# Patient Record
Sex: FEMALE | Race: WHITE | NOT HISPANIC OR LATINO | URBAN - METROPOLITAN AREA
[De-identification: names, ages, dates, MRNs, and addresses within clinical notes are randomized per-mention and may not be internally consistent; named-entity substitution may affect disease eponyms.]

---

## 2023-10-01 ENCOUNTER — HOSPITAL ENCOUNTER (EMERGENCY)
Facility: HOSPITAL | Age: 68
Discharge: HOME | End: 2023-10-01
Attending: EMERGENCY MEDICINE
Payer: MEDICARE

## 2023-10-01 ENCOUNTER — APPOINTMENT (OUTPATIENT)
Dept: RADIOLOGY | Facility: HOSPITAL | Age: 68
End: 2023-10-01
Payer: MEDICARE

## 2023-10-01 VITALS
RESPIRATION RATE: 18 BRPM | TEMPERATURE: 97.2 F | SYSTOLIC BLOOD PRESSURE: 102 MMHG | WEIGHT: 89.7 LBS | HEART RATE: 80 BPM | BODY MASS INDEX: 16.51 KG/M2 | DIASTOLIC BLOOD PRESSURE: 60 MMHG | HEIGHT: 62 IN | OXYGEN SATURATION: 99 %

## 2023-10-01 DIAGNOSIS — G89.29 CHRONIC LOW BACK PAIN WITHOUT SCIATICA, UNSPECIFIED BACK PAIN LATERALITY: Primary | ICD-10-CM

## 2023-10-01 DIAGNOSIS — M54.50 CHRONIC LOW BACK PAIN WITHOUT SCIATICA, UNSPECIFIED BACK PAIN LATERALITY: Primary | ICD-10-CM

## 2023-10-01 LAB
ALBUMIN SERPL BCP-MCNC: 3.5 G/DL (ref 3.4–5)
ALP SERPL-CCNC: 88 U/L (ref 33–136)
ALT SERPL W P-5'-P-CCNC: 16 U/L (ref 7–45)
ANION GAP SERPL CALC-SCNC: 16 MMOL/L (ref 10–20)
APPEARANCE UR: CLEAR
AST SERPL W P-5'-P-CCNC: 25 U/L (ref 9–39)
BACTERIA #/AREA URNS AUTO: ABNORMAL /HPF
BILIRUB SERPL-MCNC: 0.4 MG/DL (ref 0–1.2)
BILIRUB UR STRIP.AUTO-MCNC: NEGATIVE MG/DL
BUN SERPL-MCNC: 24 MG/DL (ref 6–23)
CALCIUM SERPL-MCNC: 7.2 MG/DL (ref 8.6–10.3)
CHLORIDE SERPL-SCNC: 98 MMOL/L (ref 98–107)
CO2 SERPL-SCNC: 23 MMOL/L (ref 21–32)
COLOR UR: YELLOW
CREAT SERPL-MCNC: 1.36 MG/DL (ref 0.5–1.05)
ERYTHROCYTE [DISTWIDTH] IN BLOOD BY AUTOMATED COUNT: 12 % (ref 11.5–14.5)
GFR SERPL CREATININE-BSD FRML MDRD: 43 ML/MIN/1.73M*2
GLUCOSE SERPL-MCNC: 105 MG/DL (ref 74–99)
GLUCOSE UR STRIP.AUTO-MCNC: NEGATIVE MG/DL
HCT VFR BLD AUTO: 27.9 % (ref 36–46)
HGB BLD-MCNC: 9.4 G/DL (ref 12–16)
KETONES UR STRIP.AUTO-MCNC: NEGATIVE MG/DL
LEUKOCYTE ESTERASE UR QL STRIP.AUTO: NEGATIVE
MCH RBC QN AUTO: 32.4 PG (ref 26–34)
MCHC RBC AUTO-ENTMCNC: 33.7 G/DL (ref 32–36)
MCV RBC AUTO: 96 FL (ref 80–100)
NITRITE UR QL STRIP.AUTO: NEGATIVE
NRBC BLD-RTO: 0 /100 WBCS (ref 0–0)
PH UR STRIP.AUTO: 5 [PH]
PLATELET # BLD AUTO: 344 X10*3/UL (ref 150–450)
PMV BLD AUTO: 11.7 FL (ref 7.5–11.5)
POTASSIUM SERPL-SCNC: 3.4 MMOL/L (ref 3.5–5.3)
PROT SERPL-MCNC: 6.4 G/DL (ref 6.4–8.2)
PROT UR STRIP.AUTO-MCNC: NEGATIVE MG/DL
RBC # BLD AUTO: 2.9 X10*6/UL (ref 4–5.2)
RBC # UR STRIP.AUTO: ABNORMAL /UL
RBC #/AREA URNS AUTO: ABNORMAL /HPF
SODIUM SERPL-SCNC: 134 MMOL/L (ref 136–145)
SP GR UR STRIP.AUTO: 1.01
TRI-PHOS CRY #/AREA UR COMP ASSIST: ABNORMAL /HPF
UROBILINOGEN UR STRIP.AUTO-MCNC: <2 MG/DL
WBC # BLD AUTO: 11 X10*3/UL (ref 4.4–11.3)
WBC #/AREA URNS AUTO: ABNORMAL /HPF

## 2023-10-01 PROCEDURE — 81001 URINALYSIS AUTO W/SCOPE: CPT | Performed by: STUDENT IN AN ORGANIZED HEALTH CARE EDUCATION/TRAINING PROGRAM

## 2023-10-01 PROCEDURE — 99284 EMERGENCY DEPT VISIT MOD MDM: CPT | Performed by: EMERGENCY MEDICINE

## 2023-10-01 PROCEDURE — 85027 COMPLETE CBC AUTOMATED: CPT | Performed by: STUDENT IN AN ORGANIZED HEALTH CARE EDUCATION/TRAINING PROGRAM

## 2023-10-01 PROCEDURE — 36415 COLL VENOUS BLD VENIPUNCTURE: CPT | Performed by: STUDENT IN AN ORGANIZED HEALTH CARE EDUCATION/TRAINING PROGRAM

## 2023-10-01 PROCEDURE — 2500000004 HC RX 250 GENERAL PHARMACY W/ HCPCS (ALT 636 FOR OP/ED): Performed by: STUDENT IN AN ORGANIZED HEALTH CARE EDUCATION/TRAINING PROGRAM

## 2023-10-01 PROCEDURE — 80053 COMPREHEN METABOLIC PANEL: CPT | Performed by: STUDENT IN AN ORGANIZED HEALTH CARE EDUCATION/TRAINING PROGRAM

## 2023-10-01 PROCEDURE — 70450 CT HEAD/BRAIN W/O DYE: CPT | Performed by: RADIOLOGY

## 2023-10-01 PROCEDURE — 70450 CT HEAD/BRAIN W/O DYE: CPT | Mod: MG

## 2023-10-01 PROCEDURE — G1004 CDSM NDSC: HCPCS

## 2023-10-01 PROCEDURE — 96374 THER/PROPH/DIAG INJ IV PUSH: CPT

## 2023-10-01 RX ORDER — KETOROLAC TROMETHAMINE 15 MG/ML
15 INJECTION, SOLUTION INTRAMUSCULAR; INTRAVENOUS ONCE
Status: COMPLETED | OUTPATIENT
Start: 2023-10-01 | End: 2023-10-01

## 2023-10-01 RX ORDER — HYDROCODONE BITARTRATE AND ACETAMINOPHEN 5; 325 MG/1; MG/1
0.5 TABLET ORAL EVERY 6 HOURS PRN
Qty: 15 TABLET | Refills: 0 | Status: SHIPPED | OUTPATIENT
Start: 2023-10-01 | End: 2023-10-09

## 2023-10-01 RX ORDER — METHYLPREDNISOLONE 4 MG/1
TABLET ORAL
Qty: 21 TABLET | Refills: 0 | Status: SHIPPED | OUTPATIENT
Start: 2023-10-01 | End: 2023-10-08

## 2023-10-01 RX ORDER — LIDOCAINE 50 MG/G
1 PATCH TOPICAL
Qty: 14 PATCH | Refills: 0 | Status: SHIPPED | OUTPATIENT
Start: 2023-10-01 | End: 2023-10-15

## 2023-10-01 RX ADMIN — KETOROLAC TROMETHAMINE 15 MG: 15 INJECTION INTRAMUSCULAR; INTRAVENOUS at 13:21

## 2023-10-01 ASSESSMENT — COLUMBIA-SUICIDE SEVERITY RATING SCALE - C-SSRS
6. HAVE YOU EVER DONE ANYTHING, STARTED TO DO ANYTHING, OR PREPARED TO DO ANYTHING TO END YOUR LIFE?: NO
1. IN THE PAST MONTH, HAVE YOU WISHED YOU WERE DEAD OR WISHED YOU COULD GO TO SLEEP AND NOT WAKE UP?: NO
2. HAVE YOU ACTUALLY HAD ANY THOUGHTS OF KILLING YOURSELF?: NO

## 2023-10-01 ASSESSMENT — PAIN SCALES - GENERAL
PAINLEVEL_OUTOF10: 10 - WORST POSSIBLE PAIN
PAINLEVEL_OUTOF10: 3
PAINLEVEL_OUTOF10: 4
PAINLEVEL_OUTOF10: 0 - NO PAIN

## 2023-10-01 ASSESSMENT — PAIN - FUNCTIONAL ASSESSMENT: PAIN_FUNCTIONAL_ASSESSMENT: 0-10

## 2023-10-01 ASSESSMENT — PAIN DESCRIPTION - ORIENTATION: ORIENTATION: LOWER;MID;UPPER

## 2023-10-01 ASSESSMENT — PAIN DESCRIPTION - LOCATION: LOCATION: BACK

## 2023-10-01 NOTE — ED PROVIDER NOTES
HPI   Chief Complaint   Patient presents with    Back Pain     Radiates down R leg       Patient is a 68-year-old female presents the ER due to concern for low back pain.  According to patient, she began to have some low back pain over the past 2 days.  She denies any weakness.  Arms or legs.  She does report some tingling in her feet, she reports this is related to her gout which she is on colchicine for.  She denies any fevers or chills.  She denies any recent falls.  She took tramadol and Flexeril over the past few days.  Her  was concerned as she has been reporting hallucinations and seeing animals that are not there.  He otherwise denies any other injuries or symptoms.  Patient has taken Flexeril and tramadol before however is never had it with wine previously.  Denies any dysuria, hematuria, chest pain, abdominal pain.    The patient was seen by the resident/fellow.  I have personally performed a substantive portion of the encounter.  I have seen and examined the patient; agree with the workup, evaluation, MDM, management and diagnosis.  The care plan has been discussed with the resident/fellow; I have reviewed the resident/fellow's note and agree with the documented findings with the exception/addition of the following:    When I spoke to the patient, she had an issue like this in the past, around 2018 and it lasted for several days to a week and then dissipated and went away and has not had any issues until 2 to 3 days ago.  The pain does go intermittently down both legs, does radiate down to the feet, but she has no weakness, no loss of bowel or bladder control, no rectal issues, no urinary retention.  She is able to ambulate to the bathroom here, and urinate fully with no retention.  At this time I recommended she follow-up with her primary care doctor, and return to the emergency room for any worsening pain down the legs, weakness on one leg or the other, difficulty urinating, loss of bladder  control, loss of bowel control, or any worsening concerning symptoms.                            Alessandra Coma Scale Score: 15                  Patient History   History reviewed. No pertinent past medical history.  History reviewed. No pertinent surgical history.  No family history on file.  Social History     Tobacco Use    Smoking status: Never    Smokeless tobacco: Never   Substance Use Topics    Alcohol use: Never    Drug use: Never       Physical Exam   ED Triage Vitals [10/01/23 1242]   Temp Heart Rate Resp BP   36.2 °C (97.2 °F) 94 16 109/66      SpO2 Temp Source Heart Rate Source Patient Position   100 % Temporal Monitor Sitting      BP Location FiO2 (%)     Right arm --       Physical Exam  Vitals and nursing note reviewed.   Constitutional:       General: She is not in acute distress.     Appearance: She is well-developed.   HENT:      Head: Normocephalic and atraumatic.      Right Ear: External ear normal.      Left Ear: External ear normal.   Eyes:      Conjunctiva/sclera: Conjunctivae normal.   Cardiovascular:      Rate and Rhythm: Normal rate and regular rhythm.      Heart sounds: No murmur heard.  Pulmonary:      Effort: Pulmonary effort is normal. No respiratory distress.      Breath sounds: Normal breath sounds.   Abdominal:      Palpations: Abdomen is soft.      Tenderness: There is no abdominal tenderness.   Musculoskeletal:         General: No swelling.      Cervical back: Neck supple.      Comments: No midline C, T, L-spine tenderness to palpation   Skin:     General: Skin is warm and dry.      Capillary Refill: Capillary refill takes less than 2 seconds.   Neurological:      Mental Status: She is alert.   Psychiatric:         Mood and Affect: Mood normal.         ED Course & MDM   ED Course as of 10/01/23 1624   Sun Oct 01, 2023   1622 CBC with mild anemia of 9.4, otherwise unremarkable. [MJ]   1622 UA with hematuria, however not consistent with UTI. [MJ]   1622 CMP with mild hypokalemia of 3.4  as well as mildly elevated BUN/creatinine of 24/1.36. [MJ]   1623   CT imaging of head with no acute intracranial process [MJ]   1623 As patient is no longer having hallucinations and work-up was relatively unremarkable, do suspect likely related to patient's medications.  She was given prescription of Lidoderm patches as well as Medrol Dosepak and Norco for pain.  She was instructed to take half tablet of Norco for pain.  She was instructed to follow-up with her PCP to discuss her ER visit.  Strict return precautions were given.  They were understanding and agreeable with plan for discharge. [MJ]      ED Course User Index  [MJ] Alejandro Maharaj DO         Diagnoses as of 10/01/23 1624   Chronic low back pain without sciatica, unspecified back pain laterality       Medical Decision Making  Patient is a 68-year-old female who presents the ER due to concern for low back pain.  On arrival, patient is in no acute distress and vital signs were stable.  She does not have any signs consistent with cauda equina syndrome so do not feel patient warrants MRI imaging at this time.  Given the reported hallucinations I do suspect likely related to the medication she was using including Flexeril and tramadol, however will get CT imaging of head as well as basic lab work including CBC and BMP for patient.  Did also get UA as well.    Amount and/or Complexity of Data Reviewed  Independent Historian: spouse        Procedure  Procedures     Alejandro Maharaj DO  Resident  10/01/23 1624       Brooks Perez DO  10/03/23 3878

## 2023-11-15 ENCOUNTER — OFFICE VISIT (OUTPATIENT)
Dept: RHEUMATOLOGY | Facility: CLINIC | Age: 68
End: 2023-11-15
Payer: MEDICARE

## 2023-11-15 VITALS
SYSTOLIC BLOOD PRESSURE: 152 MMHG | DIASTOLIC BLOOD PRESSURE: 81 MMHG | WEIGHT: 90 LBS | HEIGHT: 62 IN | BODY MASS INDEX: 16.56 KG/M2 | TEMPERATURE: 97.7 F | HEART RATE: 109 BPM

## 2023-11-15 DIAGNOSIS — M05.742 RHEUMATOID ARTHRITIS INVOLVING BOTH HANDS WITH POSITIVE RHEUMATOID FACTOR (MULTI): Primary | ICD-10-CM

## 2023-11-15 DIAGNOSIS — M05.741 RHEUMATOID ARTHRITIS INVOLVING BOTH HANDS WITH POSITIVE RHEUMATOID FACTOR (MULTI): Primary | ICD-10-CM

## 2023-11-15 PROCEDURE — 99213 OFFICE O/P EST LOW 20 MIN: CPT | Performed by: INTERNAL MEDICINE

## 2023-11-15 PROCEDURE — 1126F AMNT PAIN NOTED NONE PRSNT: CPT | Performed by: INTERNAL MEDICINE

## 2023-11-15 PROCEDURE — 1159F MED LIST DOCD IN RCRD: CPT | Performed by: INTERNAL MEDICINE

## 2023-11-15 RX ORDER — LOSARTAN POTASSIUM 100 MG/1
100 TABLET ORAL DAILY
COMMUNITY
End: 2024-04-16 | Stop reason: SDUPTHER

## 2023-11-15 RX ORDER — MECLIZINE HCL 12.5 MG 12.5 MG/1
12.5 TABLET ORAL 3 TIMES DAILY PRN
COMMUNITY
End: 2024-03-07 | Stop reason: ALTCHOICE

## 2023-11-15 RX ORDER — FUROSEMIDE 40 MG/1
1 TABLET ORAL DAILY
COMMUNITY
Start: 2021-07-13 | End: 2024-03-07 | Stop reason: WASHOUT

## 2023-11-15 RX ORDER — CHOLECALCIFEROL (VITAMIN D3) 25 MCG
3000 TABLET,CHEWABLE ORAL DAILY
COMMUNITY
End: 2024-03-09 | Stop reason: ENTERED-IN-ERROR

## 2023-11-15 RX ORDER — ASCORBIC ACID 1000 MG
TABLET, EXTENDED RELEASE ORAL
COMMUNITY
End: 2024-03-09 | Stop reason: ENTERED-IN-ERROR

## 2023-11-15 RX ORDER — TRAMADOL HYDROCHLORIDE 50 MG/1
50 TABLET ORAL EVERY 6 HOURS PRN
COMMUNITY
End: 2024-03-07 | Stop reason: ALTCHOICE

## 2023-11-15 RX ORDER — FLUTICASONE PROPIONATE 50 MCG
1 SPRAY, SUSPENSION (ML) NASAL DAILY
COMMUNITY
End: 2024-03-07 | Stop reason: WASHOUT

## 2023-11-15 RX ORDER — COLCHICINE 0.6 MG/1
0.6 CAPSULE ORAL DAILY
COMMUNITY
End: 2024-03-07 | Stop reason: WASHOUT

## 2023-11-15 RX ORDER — CYCLOBENZAPRINE HCL 10 MG
10 TABLET ORAL 2 TIMES DAILY PRN
COMMUNITY
End: 2024-03-07 | Stop reason: WASHOUT

## 2023-11-15 RX ORDER — FLUTICASONE PROPIONATE 50 MCG
SPRAY, SUSPENSION (ML) NASAL
COMMUNITY
Start: 2014-07-08

## 2023-11-15 RX ORDER — HYDROXYCHLOROQUINE SULFATE 200 MG/1
1 TABLET, FILM COATED ORAL DAILY
COMMUNITY

## 2023-11-15 ASSESSMENT — PAIN SCALES - GENERAL: PAINLEVEL: 0-NO PAIN

## 2023-11-16 NOTE — PROGRESS NOTES
Informants: Patient and EMR.  PP: 68-year-old female with rheumatoid arthritis and asthma.  CC: She presents for follow-up of rheumatoid arthritis  H OPI: She was initially diagnosed with rheumatoid arthritis in 2016 when she presented with pain stiffness and swelling in her hands.  She had significant improvement in the musculoskeletal symptoms with taking hydroxychloroquine.  She has not had any rashes, uveitis symptoms, mucosal ulcerations, or sicca symptoms.  She has not had any Raynaud's symptoms.  She has pain and numbness in the right hand that she attributes to the carpal tunnel syndrome and has pain in the right hip due to arthritis.  She is planning on having right carpal tunnel release surgery prior to considering any surgical intervention for the right hip pain.  PH: Allergies: Penicillin, amoxicillin (nausea, diarrhea, vomiting), codeine, flu shots, tetanus shots (redness and swelling); illnesses: Hypertension, osteoarthritis, osteopenia, asthma, osteoarthritis of the neck, rheumatoid arthritis (diagnosed ); surgeries: Appendectomy (), D&C ( for dysfunctional uterine bleeding), hysteroscopy ( for leiomyomata and dysfunctional uterine bleeding), oral surgery.  SH: She quit tobacco smoking but formally smoked half pack of cigarettes per day.  She has 2 glasses of wine per day.  She denies illicit drug use.  She is retired.  FH: Father is  at 87 years of age and had arthritis heart disease and Alzheimer's disease.  Her mother atrial fibrillation aphasic dementia kidney disease, heart disease.  Her brother has atrial fibrillation and arthritis.  Her sister has arthritis and atrial fibrillation.  She has no children.  She has a grandmother that cancer stroke colitis diabetes mellitus.  She has a paternal grandmother with rheumatoid arthritis.  ROS: She has had decreased appetite and has weight loss that she attributes to caring for her mother with dementia.  Her mother was recently  placed in a memory care facility reducing her stress.  PX: She is a thin, well-developed, well-nourished, white female.  The lungs, heart, abdomen, and extremities are benign.  The musculoskeletal examination shows good passive range of motion of the upper and lower extremity joints without joint effusions.  There is mild pain on range of motion of the right hip.  There is crepitus on passive range of motion of the neck.  The neurological examination shows good muscle strength and both hands as well as the upper and lower extremities.  Laboratory (10/1/2023) glucose 105, potassium 3.4, sodium 134, chloride 98, bicarbonate 23, BUN 24, creatinine 1.36, WBC 11.0, hemoglobin 9.4, hematocrit 27.9, MCV 96, MCHC 33.7, platelets 344, urinalysis no protein glucose or ketones.  There is moderate blood, WBC 1-5, RBC none, bacteria 1+, triple phosphate crystals 2+.  Impression: 68-year-old white female with rheumatoid arthritis, asthma, hypertension, cervical and lumbar spondylosis, renal insufficiency, carpal tunnel syndrome most symptomatic in the right wrist.  She is on hydroxychloroquine 200 mg daily with normocytic anemia and history of weight loss.  Plan: She is to continue hydroxychloroquine 200 mg once per day but may need to reduce the dose if she continues to lose weight.  She is to have laboratory for CBC, comprehensive metabolic panel, anti-CCP, and CRP.  She is to return at the next available office appointment.

## 2023-12-25 ENCOUNTER — APPOINTMENT (OUTPATIENT)
Dept: RADIOLOGY | Facility: HOSPITAL | Age: 68
End: 2023-12-25
Payer: MEDICARE

## 2023-12-25 ENCOUNTER — APPOINTMENT (OUTPATIENT)
Dept: CARDIOLOGY | Facility: HOSPITAL | Age: 68
End: 2023-12-25
Payer: MEDICARE

## 2023-12-25 ENCOUNTER — HOSPITAL ENCOUNTER (EMERGENCY)
Facility: HOSPITAL | Age: 68
Discharge: HOME | End: 2023-12-25
Attending: STUDENT IN AN ORGANIZED HEALTH CARE EDUCATION/TRAINING PROGRAM
Payer: MEDICARE

## 2023-12-25 VITALS
HEIGHT: 62 IN | RESPIRATION RATE: 18 BRPM | BODY MASS INDEX: 16.01 KG/M2 | WEIGHT: 87 LBS | TEMPERATURE: 98.6 F | DIASTOLIC BLOOD PRESSURE: 82 MMHG | SYSTOLIC BLOOD PRESSURE: 162 MMHG | OXYGEN SATURATION: 98 % | HEART RATE: 88 BPM

## 2023-12-25 DIAGNOSIS — S22.31XA CLOSED FRACTURE OF ONE RIB OF RIGHT SIDE, INITIAL ENCOUNTER: ICD-10-CM
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
## 2023-12-25 DIAGNOSIS — G96.08 SUBDURAL HYGROMA: ICD-10-CM

## 2023-12-25 LAB
ALBUMIN SERPL BCP-MCNC: 4.5 G/DL (ref 3.4–5)
ALP SERPL-CCNC: 94 U/L (ref 33–136)
ALT SERPL W P-5'-P-CCNC: 13 U/L (ref 7–45)
ANION GAP SERPL CALC-SCNC: 20 MMOL/L (ref 10–20)
AST SERPL W P-5'-P-CCNC: 21 U/L (ref 9–39)
BASOPHILS # BLD AUTO: 0.06 X10*3/UL (ref 0–0.1)
BASOPHILS NFR BLD AUTO: 0.6 %
BILIRUB SERPL-MCNC: 0.7 MG/DL (ref 0–1.2)
BUN SERPL-MCNC: 11 MG/DL (ref 6–23)
CALCIUM SERPL-MCNC: 8.9 MG/DL (ref 8.6–10.3)
CARDIAC TROPONIN I PNL SERPL HS: 4 NG/L (ref 0–13)
CHLORIDE SERPL-SCNC: 105 MMOL/L (ref 98–107)
CO2 SERPL-SCNC: 21 MMOL/L (ref 21–32)
CREAT SERPL-MCNC: 0.86 MG/DL (ref 0.5–1.05)
EOSINOPHIL # BLD AUTO: 0.04 X10*3/UL (ref 0–0.7)
EOSINOPHIL NFR BLD AUTO: 0.4 %
ERYTHROCYTE [DISTWIDTH] IN BLOOD BY AUTOMATED COUNT: 13.6 % (ref 11.5–14.5)
GFR SERPL CREATININE-BSD FRML MDRD: 75 ML/MIN/1.73M*2
GLUCOSE SERPL-MCNC: 91 MG/DL (ref 74–99)
HCT VFR BLD AUTO: 39.6 % (ref 36–46)
HGB BLD-MCNC: 13.4 G/DL (ref 12–16)
IMM GRANULOCYTES # BLD AUTO: 0.03 X10*3/UL (ref 0–0.7)
IMM GRANULOCYTES NFR BLD AUTO: 0.3 % (ref 0–0.9)
INR PPP: 0.9 (ref 0.9–1.1)
LACTATE SERPL-SCNC: 2.9 MMOL/L (ref 0.4–2)
LYMPHOCYTES # BLD AUTO: 1.61 X10*3/UL (ref 1.2–4.8)
LYMPHOCYTES NFR BLD AUTO: 17.4 %
MAGNESIUM SERPL-MCNC: 1.34 MG/DL (ref 1.6–2.4)
MCH RBC QN AUTO: 32.6 PG (ref 26–34)
MCHC RBC AUTO-ENTMCNC: 33.8 G/DL (ref 32–36)
MCV RBC AUTO: 96 FL (ref 80–100)
MONOCYTES # BLD AUTO: 0.58 X10*3/UL (ref 0.1–1)
MONOCYTES NFR BLD AUTO: 6.3 %
NEUTROPHILS # BLD AUTO: 6.94 X10*3/UL (ref 1.2–7.7)
NEUTROPHILS NFR BLD AUTO: 75 %
NRBC BLD-RTO: 0 /100 WBCS (ref 0–0)
PLATELET # BLD AUTO: 221 X10*3/UL (ref 150–450)
POTASSIUM SERPL-SCNC: 3.5 MMOL/L (ref 3.5–5.3)
PROT SERPL-MCNC: 6.7 G/DL (ref 6.4–8.2)
PROTHROMBIN TIME: 10.6 SECONDS (ref 9.8–12.8)
RBC # BLD AUTO: 4.11 X10*6/UL (ref 4–5.2)
SODIUM SERPL-SCNC: 142 MMOL/L (ref 136–145)
WBC # BLD AUTO: 9.3 X10*3/UL (ref 4.4–11.3)

## 2023-12-25 PROCEDURE — 85610 PROTHROMBIN TIME: CPT | Performed by: STUDENT IN AN ORGANIZED HEALTH CARE EDUCATION/TRAINING PROGRAM

## 2023-12-25 PROCEDURE — 72125 CT NECK SPINE W/O DYE: CPT

## 2023-12-25 PROCEDURE — 72125 CT NECK SPINE W/O DYE: CPT | Performed by: RADIOLOGY

## 2023-12-25 PROCEDURE — 71250 CT THORAX DX C-: CPT | Performed by: RADIOLOGY

## 2023-12-25 PROCEDURE — 80053 COMPREHEN METABOLIC PANEL: CPT | Performed by: STUDENT IN AN ORGANIZED HEALTH CARE EDUCATION/TRAINING PROGRAM

## 2023-12-25 PROCEDURE — 83735 ASSAY OF MAGNESIUM: CPT | Performed by: STUDENT IN AN ORGANIZED HEALTH CARE EDUCATION/TRAINING PROGRAM

## 2023-12-25 PROCEDURE — 85025 COMPLETE CBC W/AUTO DIFF WBC: CPT | Performed by: STUDENT IN AN ORGANIZED HEALTH CARE EDUCATION/TRAINING PROGRAM

## 2023-12-25 PROCEDURE — 71045 X-RAY EXAM CHEST 1 VIEW: CPT | Performed by: RADIOLOGY

## 2023-12-25 PROCEDURE — 74176 CT ABD & PELVIS W/O CONTRAST: CPT

## 2023-12-25 PROCEDURE — 70450 CT HEAD/BRAIN W/O DYE: CPT | Performed by: RADIOLOGY

## 2023-12-25 PROCEDURE — 96365 THER/PROPH/DIAG IV INF INIT: CPT

## 2023-12-25 PROCEDURE — 2500000001 HC RX 250 WO HCPCS SELF ADMINISTERED DRUGS (ALT 637 FOR MEDICARE OP): Performed by: STUDENT IN AN ORGANIZED HEALTH CARE EDUCATION/TRAINING PROGRAM

## 2023-12-25 PROCEDURE — 99285 EMERGENCY DEPT VISIT HI MDM: CPT | Performed by: STUDENT IN AN ORGANIZED HEALTH CARE EDUCATION/TRAINING PROGRAM

## 2023-12-25 PROCEDURE — 70450 CT HEAD/BRAIN W/O DYE: CPT

## 2023-12-25 PROCEDURE — 84484 ASSAY OF TROPONIN QUANT: CPT | Performed by: STUDENT IN AN ORGANIZED HEALTH CARE EDUCATION/TRAINING PROGRAM

## 2023-12-25 PROCEDURE — 36415 COLL VENOUS BLD VENIPUNCTURE: CPT | Performed by: STUDENT IN AN ORGANIZED HEALTH CARE EDUCATION/TRAINING PROGRAM

## 2023-12-25 PROCEDURE — 96366 THER/PROPH/DIAG IV INF ADDON: CPT

## 2023-12-25 PROCEDURE — G0390 TRAUMA RESPONS W/HOSP CRITI: HCPCS

## 2023-12-25 PROCEDURE — 93005 ELECTROCARDIOGRAM TRACING: CPT

## 2023-12-25 PROCEDURE — 74176 CT ABD & PELVIS W/O CONTRAST: CPT | Performed by: RADIOLOGY

## 2023-12-25 PROCEDURE — 83605 ASSAY OF LACTIC ACID: CPT | Performed by: STUDENT IN AN ORGANIZED HEALTH CARE EDUCATION/TRAINING PROGRAM

## 2023-12-25 PROCEDURE — 71045 X-RAY EXAM CHEST 1 VIEW: CPT

## 2023-12-25 PROCEDURE — 99285 EMERGENCY DEPT VISIT HI MDM: CPT | Mod: 25 | Performed by: STUDENT IN AN ORGANIZED HEALTH CARE EDUCATION/TRAINING PROGRAM

## 2023-12-25 PROCEDURE — 96375 TX/PRO/DX INJ NEW DRUG ADDON: CPT

## 2023-12-25 PROCEDURE — 99285 EMERGENCY DEPT VISIT HI MDM: CPT

## 2023-12-25 PROCEDURE — 2500000004 HC RX 250 GENERAL PHARMACY W/ HCPCS (ALT 636 FOR OP/ED): Performed by: STUDENT IN AN ORGANIZED HEALTH CARE EDUCATION/TRAINING PROGRAM

## 2023-12-25 RX ORDER — FENTANYL CITRATE 50 UG/ML
50 INJECTION, SOLUTION INTRAMUSCULAR; INTRAVENOUS ONCE
Status: COMPLETED | OUTPATIENT
Start: 2023-12-25 | End: 2023-12-25

## 2023-12-25 RX ORDER — IBUPROFEN 400 MG/1
400 TABLET ORAL EVERY 6 HOURS PRN
Status: DISCONTINUED | OUTPATIENT
Start: 2023-12-25 | End: 2023-12-25 | Stop reason: HOSPADM

## 2023-12-25 RX ORDER — MAGNESIUM SULFATE HEPTAHYDRATE 40 MG/ML
2 INJECTION, SOLUTION INTRAVENOUS ONCE
Status: COMPLETED | OUTPATIENT
Start: 2023-12-25 | End: 2023-12-25

## 2023-12-25 RX ORDER — HYDROCODONE BITARTRATE AND ACETAMINOPHEN 5; 325 MG/1; MG/1
1 TABLET ORAL EVERY 6 HOURS PRN
Status: DISCONTINUED | OUTPATIENT
Start: 2023-12-25 | End: 2023-12-25 | Stop reason: HOSPADM

## 2023-12-25 RX ORDER — KETOROLAC TROMETHAMINE 15 MG/ML
15 INJECTION, SOLUTION INTRAMUSCULAR; INTRAVENOUS ONCE
Status: DISCONTINUED | OUTPATIENT
Start: 2023-12-25 | End: 2023-12-25

## 2023-12-25 RX ADMIN — FENTANYL CITRATE 50 MCG: 50 INJECTION, SOLUTION INTRAMUSCULAR; INTRAVENOUS at 12:34

## 2023-12-25 RX ADMIN — SODIUM CHLORIDE, POTASSIUM CHLORIDE, SODIUM LACTATE AND CALCIUM CHLORIDE 1000 ML: 600; 310; 30; 20 INJECTION, SOLUTION INTRAVENOUS at 12:35

## 2023-12-25 RX ADMIN — IBUPROFEN 400 MG: 400 TABLET, FILM COATED ORAL at 15:08

## 2023-12-25 RX ADMIN — MAGNESIUM SULFATE HEPTAHYDRATE 2 G: 40 INJECTION, SOLUTION INTRAVENOUS at 13:06

## 2023-12-25 RX ADMIN — HYDROCODONE BITARTRATE AND ACETAMINOPHEN 1 TABLET: 5; 325 TABLET ORAL at 15:08

## 2023-12-25 ASSESSMENT — PAIN DESCRIPTION - ORIENTATION: ORIENTATION: RIGHT

## 2023-12-25 ASSESSMENT — LIFESTYLE VARIABLES
EVER FELT BAD OR GUILTY ABOUT YOUR DRINKING: NO
EVER HAD A DRINK FIRST THING IN THE MORNING TO STEADY YOUR NERVES TO GET RID OF A HANGOVER: NO
HAVE YOU EVER FELT YOU SHOULD CUT DOWN ON YOUR DRINKING: NO
HAVE PEOPLE ANNOYED YOU BY CRITICIZING YOUR DRINKING: NO
REASON UNABLE TO ASSESS: NO

## 2023-12-25 ASSESSMENT — PAIN SCALES - GENERAL
PAINLEVEL_OUTOF10: 7
PAINLEVEL_OUTOF10: 0 - NO PAIN
PAINLEVEL_OUTOF10: 7

## 2023-12-25 ASSESSMENT — PAIN - FUNCTIONAL ASSESSMENT
PAIN_FUNCTIONAL_ASSESSMENT: 0-10

## 2023-12-25 ASSESSMENT — PAIN DESCRIPTION - LOCATION: LOCATION: BACK

## 2023-12-25 NOTE — ED PROVIDER NOTES
"HPI   Chief Complaint   Patient presents with    Fall     Pt states \"my hip gave out\" she fell into a car hitting her right side and back, No thinners, no head injuries       This is a female patient with past medical history of hypertension and rheumatoid arthritis who presents to the ED for right-sided traumatic flank pain.  Last night, she missed a step while going up the stairs, fell down about 3 steps and struck her right side against her car.  She not lose consciousness feel lightheaded or dizzy or experience chest pain prior to this, did not lose consciousness or hit her head.    Reports that she has had right-sided flank pain and midline back pain since then, no bowel/bladder incontinence or saddle anesthesia.                              Alessandra Coma Scale Score: 15                  Patient History   No past medical history on file.  No past surgical history on file.  No family history on file.  Social History     Tobacco Use    Smoking status: Every Day     Packs/day: 0.50     Years: 15.00     Additional pack years: 0.00     Total pack years: 7.50     Types: Cigarettes    Smokeless tobacco: Never   Substance Use Topics    Alcohol use: Never    Drug use: Never       Physical Exam   ED Triage Vitals   Temp Heart Rate Resp BP   12/25/23 1140 12/25/23 1140 12/25/23 1140 12/25/23 1140   37 °C (98.6 °F) (!) 123 20 163/90      SpO2 Temp Source Heart Rate Source Patient Position   12/25/23 1220 12/25/23 1140 12/25/23 1140 12/25/23 1140   100 % Temporal Monitor Sitting      BP Location FiO2 (%)     12/25/23 1140 --     Right arm        Physical Exam  Constitutional:       Appearance: Normal appearance.   HENT:      Head: Normocephalic and atraumatic.      Mouth/Throat:      Mouth: Mucous membranes are moist.   Eyes:      Extraocular Movements: Extraocular movements intact.   Cardiovascular:      Rate and Rhythm: Regular rhythm. Tachycardia present.      Heart sounds: Normal heart sounds. No murmur " heard.  Pulmonary:      Effort: Pulmonary effort is normal. No respiratory distress.      Breath sounds: Normal breath sounds. No wheezing.   Abdominal:      General: There is no distension.      Palpations: Abdomen is soft.      Tenderness: There is no abdominal tenderness. There is no guarding.   Musculoskeletal:         General: Tenderness present.      Right lower leg: No edema.      Left lower leg: No edema.      Comments: There is significant bruising along the right-sided flank area, some lumbar spine area tenderness.   Skin:     General: Skin is warm and dry.   Neurological:      General: No focal deficit present.      Mental Status: She is alert and oriented to person, place, and time.   Psychiatric:         Mood and Affect: Mood normal.         Behavior: Behavior normal.         ED Course & MDM   Diagnoses as of 12/25/23 1952   Fall, initial encounter   Closed fracture of one rib of right side, initial encounter   Subdural hygroma       Medical Decision Making  Initially tachycardic on arrival to the ED persistently tachycardic.  Given this and the combination of a right-sided flank bruising, there is concern for intraperitoneal or retroperitoneal traumatic injury.  Patient will be given an LR bolus and fentanyl for pain.    Will also trauma activated the patient due to her unstable vital signs.  Will obtain a CT imaging of head and C-spine, and CT of the chest/abdomen/pelvis without IV contrast due to iodine allergy      The patient's laboratory studies did demonstrate elevated lactate 2.9, some hypomagnesemia, but CBC and CMP grossly unremarkable.    I did order for liter of IV fluid due to elevated lactate.  The patient was also received fentanyl for pain control, after that, the patient's heart rate did improve significantly    The patient's CT head not demonstrating any acute or subacute intracranial injury.  CT cervical spine no acute fracture or malalignment.    There was demonstration of chronic  subdural hygromas.  I discussed this finding with Dr. Lane, on-call neurosurgeon, recommending for close outpatient neurosurgery follow-up.  Patient was made aware of this finding, instructed to follow-up with Dr. Lane as an outpatient.  Patient was given incentive spirometer, prescription for Norco and will follow-up as an outpatient    She is agreeable to plan    Discussed with the attending  Pop Pacheco DO PGY-4  Emergency Medicine        Procedure  Procedures     Pop Pacheco DO  Resident  12/25/23 1529       Pop Pacheco DO  Resident  12/25/23 1952

## 2023-12-25 NOTE — ED NOTES
1216 Limited trauma one push activation   1218 Dr Acevedo responded      Loree Casillas, EMT  12/25/23 1223

## 2023-12-26 RX ORDER — HYDROCODONE BITARTRATE AND ACETAMINOPHEN 5; 325 MG/1; MG/1
1 TABLET ORAL EVERY 6 HOURS PRN
Qty: 12 TABLET | Refills: 0 | Status: SHIPPED | OUTPATIENT
Start: 2023-12-26 | End: 2023-12-29

## 2023-12-26 RX ORDER — HYDROCODONE BITARTRATE AND ACETAMINOPHEN 5; 325 MG/1; MG/1
1 TABLET ORAL EVERY 6 HOURS PRN
Qty: 12 TABLET | Refills: 0 | Status: SHIPPED | OUTPATIENT
Start: 2023-12-26 | End: 2023-12-26 | Stop reason: SDUPTHER

## 2023-12-30 LAB
ATRIAL RATE: 92 BPM
P AXIS: 70 DEGREES
P OFFSET: 208 MS
P ONSET: 161 MS
PR INTERVAL: 126 MS
Q ONSET: 224 MS
QRS COUNT: 15 BEATS
QRS DURATION: 72 MS
QT INTERVAL: 366 MS
QTC CALCULATION(BAZETT): 452 MS
QTC FREDERICIA: 422 MS
R AXIS: 61 DEGREES
T AXIS: 58 DEGREES
T OFFSET: 407 MS
VENTRICULAR RATE: 92 BPM

## 2024-01-29 ENCOUNTER — APPOINTMENT (OUTPATIENT)
Dept: RADIOLOGY | Facility: HOSPITAL | Age: 69
End: 2024-01-29
Payer: MEDICARE

## 2024-01-29 ENCOUNTER — HOSPITAL ENCOUNTER (EMERGENCY)
Facility: HOSPITAL | Age: 69
Discharge: HOME | End: 2024-01-29
Attending: EMERGENCY MEDICINE
Payer: MEDICARE

## 2024-01-29 ENCOUNTER — APPOINTMENT (OUTPATIENT)
Dept: CARDIOLOGY | Facility: HOSPITAL | Age: 69
End: 2024-01-29
Payer: MEDICARE

## 2024-01-29 VITALS
HEIGHT: 62 IN | SYSTOLIC BLOOD PRESSURE: 211 MMHG | DIASTOLIC BLOOD PRESSURE: 101 MMHG | RESPIRATION RATE: 17 BRPM | BODY MASS INDEX: 16.56 KG/M2 | HEART RATE: 82 BPM | OXYGEN SATURATION: 97 % | WEIGHT: 90 LBS | TEMPERATURE: 97.5 F

## 2024-01-29 DIAGNOSIS — I10 HYPERTENSION, UNSPECIFIED TYPE: Primary | ICD-10-CM

## 2024-01-29 DIAGNOSIS — E83.42 HYPOMAGNESEMIA: ICD-10-CM

## 2024-01-29 DIAGNOSIS — R51.9 CHRONIC NONINTRACTABLE HEADACHE, UNSPECIFIED HEADACHE TYPE: ICD-10-CM

## 2024-01-29 DIAGNOSIS — G89.29 CHRONIC NONINTRACTABLE HEADACHE, UNSPECIFIED HEADACHE TYPE: ICD-10-CM

## 2024-01-29 DIAGNOSIS — E87.6 HYPOKALEMIA: ICD-10-CM

## 2024-01-29 LAB
ALBUMIN SERPL BCP-MCNC: 4.2 G/DL (ref 3.4–5)
ALP SERPL-CCNC: 74 U/L (ref 33–136)
ALT SERPL W P-5'-P-CCNC: 19 U/L (ref 7–45)
ANION GAP SERPL CALC-SCNC: 14 MMOL/L (ref 10–20)
APPEARANCE UR: CLEAR
AST SERPL W P-5'-P-CCNC: 30 U/L (ref 9–39)
BASOPHILS # BLD AUTO: 0.04 X10*3/UL (ref 0–0.1)
BASOPHILS NFR BLD AUTO: 0.4 %
BILIRUB SERPL-MCNC: 0.6 MG/DL (ref 0–1.2)
BILIRUB UR STRIP.AUTO-MCNC: NEGATIVE MG/DL
BUN SERPL-MCNC: 16 MG/DL (ref 6–23)
CALCIUM SERPL-MCNC: 9.1 MG/DL (ref 8.6–10.3)
CARDIAC TROPONIN I PNL SERPL HS: 7 NG/L (ref 0–13)
CARDIAC TROPONIN I PNL SERPL HS: 7 NG/L (ref 0–13)
CHLORIDE SERPL-SCNC: 105 MMOL/L (ref 98–107)
CO2 SERPL-SCNC: 25 MMOL/L (ref 21–32)
COLOR UR: ABNORMAL
CREAT SERPL-MCNC: 0.74 MG/DL (ref 0.5–1.05)
D DIMER PPP FEU-MCNC: 448 NG/ML FEU
D DIMER PPP FEU-MCNC: 485 NG/ML FEU
EGFRCR SERPLBLD CKD-EPI 2021: 89 ML/MIN/1.73M*2
EOSINOPHIL # BLD AUTO: 0.09 X10*3/UL (ref 0–0.7)
EOSINOPHIL NFR BLD AUTO: 0.9 %
ERYTHROCYTE [DISTWIDTH] IN BLOOD BY AUTOMATED COUNT: 12.5 % (ref 11.5–14.5)
GLUCOSE SERPL-MCNC: 90 MG/DL (ref 74–99)
GLUCOSE UR STRIP.AUTO-MCNC: NEGATIVE MG/DL
HCT VFR BLD AUTO: 35.7 % (ref 36–46)
HGB BLD-MCNC: 11.9 G/DL (ref 12–16)
IMM GRANULOCYTES # BLD AUTO: 0.03 X10*3/UL (ref 0–0.7)
IMM GRANULOCYTES NFR BLD AUTO: 0.3 % (ref 0–0.9)
KETONES UR STRIP.AUTO-MCNC: NEGATIVE MG/DL
LEUKOCYTE ESTERASE UR QL STRIP.AUTO: ABNORMAL
LYMPHOCYTES # BLD AUTO: 2.35 X10*3/UL (ref 1.2–4.8)
LYMPHOCYTES NFR BLD AUTO: 23.4 %
MAGNESIUM SERPL-MCNC: 1.2 MG/DL (ref 1.6–2.4)
MCH RBC QN AUTO: 33.1 PG (ref 26–34)
MCHC RBC AUTO-ENTMCNC: 33.3 G/DL (ref 32–36)
MCV RBC AUTO: 99 FL (ref 80–100)
MONOCYTES # BLD AUTO: 0.64 X10*3/UL (ref 0.1–1)
MONOCYTES NFR BLD AUTO: 6.4 %
NEUTROPHILS # BLD AUTO: 6.88 X10*3/UL (ref 1.2–7.7)
NEUTROPHILS NFR BLD AUTO: 68.6 %
NITRITE UR QL STRIP.AUTO: NEGATIVE
NRBC BLD-RTO: 0 /100 WBCS (ref 0–0)
PH UR STRIP.AUTO: 5 [PH]
PLATELET # BLD AUTO: 195 X10*3/UL (ref 150–450)
POTASSIUM SERPL-SCNC: 3.4 MMOL/L (ref 3.5–5.3)
PROT SERPL-MCNC: 6.3 G/DL (ref 6.4–8.2)
PROT UR STRIP.AUTO-MCNC: NEGATIVE MG/DL
RBC # BLD AUTO: 3.59 X10*6/UL (ref 4–5.2)
RBC # UR STRIP.AUTO: NEGATIVE /UL
RBC #/AREA URNS AUTO: NORMAL /HPF
SODIUM SERPL-SCNC: 141 MMOL/L (ref 136–145)
SP GR UR STRIP.AUTO: 1
SQUAMOUS #/AREA URNS AUTO: NORMAL /HPF
UROBILINOGEN UR STRIP.AUTO-MCNC: <2 MG/DL
WBC # BLD AUTO: 10 X10*3/UL (ref 4.4–11.3)
WBC #/AREA URNS AUTO: NORMAL /HPF

## 2024-01-29 PROCEDURE — 99285 EMERGENCY DEPT VISIT HI MDM: CPT | Performed by: EMERGENCY MEDICINE

## 2024-01-29 PROCEDURE — 85379 FIBRIN DEGRADATION QUANT: CPT | Performed by: EMERGENCY MEDICINE

## 2024-01-29 PROCEDURE — 36415 COLL VENOUS BLD VENIPUNCTURE: CPT | Performed by: STUDENT IN AN ORGANIZED HEALTH CARE EDUCATION/TRAINING PROGRAM

## 2024-01-29 PROCEDURE — 84484 ASSAY OF TROPONIN QUANT: CPT | Performed by: EMERGENCY MEDICINE

## 2024-01-29 PROCEDURE — 80053 COMPREHEN METABOLIC PANEL: CPT | Performed by: EMERGENCY MEDICINE

## 2024-01-29 PROCEDURE — 2500000004 HC RX 250 GENERAL PHARMACY W/ HCPCS (ALT 636 FOR OP/ED): Performed by: EMERGENCY MEDICINE

## 2024-01-29 PROCEDURE — 36415 COLL VENOUS BLD VENIPUNCTURE: CPT | Performed by: EMERGENCY MEDICINE

## 2024-01-29 PROCEDURE — 85025 COMPLETE CBC W/AUTO DIFF WBC: CPT | Performed by: EMERGENCY MEDICINE

## 2024-01-29 PROCEDURE — 85379 FIBRIN DEGRADATION QUANT: CPT | Performed by: STUDENT IN AN ORGANIZED HEALTH CARE EDUCATION/TRAINING PROGRAM

## 2024-01-29 PROCEDURE — 70450 CT HEAD/BRAIN W/O DYE: CPT

## 2024-01-29 PROCEDURE — 85025 COMPLETE CBC W/AUTO DIFF WBC: CPT | Performed by: STUDENT IN AN ORGANIZED HEALTH CARE EDUCATION/TRAINING PROGRAM

## 2024-01-29 PROCEDURE — 80053 COMPREHEN METABOLIC PANEL: CPT | Performed by: STUDENT IN AN ORGANIZED HEALTH CARE EDUCATION/TRAINING PROGRAM

## 2024-01-29 PROCEDURE — 93005 ELECTROCARDIOGRAM TRACING: CPT

## 2024-01-29 PROCEDURE — 83735 ASSAY OF MAGNESIUM: CPT | Performed by: EMERGENCY MEDICINE

## 2024-01-29 PROCEDURE — 99285 EMERGENCY DEPT VISIT HI MDM: CPT | Mod: 25

## 2024-01-29 PROCEDURE — 81001 URINALYSIS AUTO W/SCOPE: CPT | Performed by: EMERGENCY MEDICINE

## 2024-01-29 PROCEDURE — 70450 CT HEAD/BRAIN W/O DYE: CPT | Performed by: RADIOLOGY

## 2024-01-29 RX ORDER — POTASSIUM CHLORIDE 20 MEQ/1
40 TABLET, EXTENDED RELEASE ORAL ONCE
Status: COMPLETED | OUTPATIENT
Start: 2024-01-29 | End: 2024-01-29

## 2024-01-29 RX ORDER — AMLODIPINE BESYLATE 5 MG/1
2.5 TABLET ORAL DAILY
Qty: 15 TABLET | Refills: 0 | Status: SHIPPED | OUTPATIENT
Start: 2024-01-29 | End: 2024-03-14 | Stop reason: SINTOL

## 2024-01-29 RX ORDER — LANOLIN ALCOHOL/MO/W.PET/CERES
400 CREAM (GRAM) TOPICAL DAILY
Status: DISCONTINUED | OUTPATIENT
Start: 2024-01-29 | End: 2024-01-29 | Stop reason: HOSPADM

## 2024-01-29 RX ADMIN — POTASSIUM CHLORIDE 40 MEQ: 1500 TABLET, EXTENDED RELEASE ORAL at 18:15

## 2024-01-29 RX ADMIN — Medication 400 MG: at 18:33

## 2024-01-29 ASSESSMENT — LIFESTYLE VARIABLES
HAVE PEOPLE ANNOYED YOU BY CRITICIZING YOUR DRINKING: NO
HAVE YOU EVER FELT YOU SHOULD CUT DOWN ON YOUR DRINKING: NO
EVER HAD A DRINK FIRST THING IN THE MORNING TO STEADY YOUR NERVES TO GET RID OF A HANGOVER: NO
REASON UNABLE TO ASSESS: NO
EVER FELT BAD OR GUILTY ABOUT YOUR DRINKING: NO

## 2024-01-29 ASSESSMENT — PAIN - FUNCTIONAL ASSESSMENT: PAIN_FUNCTIONAL_ASSESSMENT: 0-10

## 2024-01-29 NOTE — ED PROVIDER NOTES
EMERGENCY DEPARTMENT ENCOUNTER      Pt Name: Sang Joy  MRN: [id number]  Birthdate 1955  Date of evaluation: 1/29/2024  Provider: Esteban Falcon MD    CHIEF COMPLAINT       Chief Complaint   Patient presents with    Hypertension    Rapid Heart Rate         HISTORY OF PRESENT ILLNESS    The patient is a 68-year-old female who comes to the emergency department due to hypertension and rapid heart rate.  The patient's pulse was recorded at 117 and blood pressure 213/101.  Manual pulse check by me recorded a pulse of 90.  The patient comes in today because her symptoms of high blood pressure and rapid heart rate has been happening for the past 2 weeks and has been progressively getting worse.  The patient states that she regularly checks her heart rate at home and it has been fluctuating between around 66 and 130.  This is the first time that the patient says that she has been having progressively worsening increased blood pressure and heart rate.  The patient denies any significant past medical history except for rheumatoid arthritis and osteoarthritis.  The patient does claim that she has a significant history of heart conditions in her family includes atrial fibrillation and congestive heart failure.  The patient weighs 90 pounds and claims that she lost a lot of weight taking care of her family member.  The patient claims that she had a pulsating headache before coming into the ER, but now her headache has mostly subsided.  The patient denies any vision changes, nasal drainage, sore throat, neck pain, neck stiffness, back pain, or abdominal pain.      History provided by:  Patient   used: No        Nursing Notes were reviewed.    PAST MEDICAL HISTORY   History reviewed. No pertinent past medical history.      SURGICAL HISTORY     History reviewed. No pertinent surgical history.      CURRENT MEDICATIONS       Previous Medications    ALBUTEROL SULFATE INHL    Inhale 90 mcg if needed.     CA CARB-CA GLUC-MG OX-MG GLUCO (CALCIUM MAGNESIUM) 500 MG CALCIUM -250 MG TABLET    Take by mouth.    COLCHICINE (MITIGARE) 0.6 MG CAPSULE CAPSULE    Take 1 capsule (0.6 mg) by mouth once daily. When needed    CYANOCOBALAMIN, VITAMIN B-12, 3,000 MCG CAPSULE    Take 3,000 mg by mouth once daily.    CYCLOBENZAPRINE (FLEXERIL) 10 MG TABLET    Take 1 tablet (10 mg) by mouth 2 times a day as needed. asneeded    FISH OIL CONCENTRATE (OMEGA-3) 120-180 MG CAPSULE    Take 1 capsule (1 g) by mouth once daily.    FLUTICASONE (FLONASE ALLERGY RELIEF) 50 MCG/ACTUATION NASAL SPRAY    1 sprays, Nasal, DAILY, in each nostril, Refill(s) 0    FLUTICASONE (FLONASE ALLERGY RELIEF) 50 MCG/ACTUATION NASAL SPRAY    Administer 1 spray into affected nostril(s) once daily.    FUROSEMIDE (LASIX) 40 MG TABLET    Take 1 tablet (40 mg) by mouth once daily. When needed    HYDROXYCHLOROQUINE (PLAQUENIL) 200 MG TABLET    Take 1 tablet (200 mg) by mouth once daily.    LORATADINE-PSEUDOEPHEDRINE (CLARITIN-D 24-HOUR)  MG 24 HR TABLET    Take 10 mg by mouth once daily.    LOSARTAN (COZAAR) 100 MG TABLET    Take 1 tablet (100 mg) by mouth once daily.    MECLIZINE (ANTIVERT) 12.5 MG TABLET    Take 1 tablet (12.5 mg) by mouth 3 times a day as needed for dizziness.    MULTIVITAMIN CAPSULE    1 capsule once daily.    POTASSIUM GLUCONATE ORAL    Take 90 mg by mouth once daily.    TRAMADOL (ULTRAM) 50 MG TABLET    Take 1 tablet (50 mg) by mouth every 6 hours if needed.    ZINC ACETATE 50 MG (ZINC) CAPSULE    Take 50 mg by mouth once daily.       ALLERGIES     Amoxicillin; Iodine; Codeine; Penicillins; and Tetanus toxoid, adsorbed    FAMILY HISTORY     No family history on file.       SOCIAL HISTORY       Social History     Socioeconomic History    Marital status:      Spouse name: None    Number of children: None    Years of education: None    Highest education level: None   Occupational History    None   Tobacco Use    Smoking status: Every Day      Packs/day: 0.50     Years: 15.00     Additional pack years: 0.00     Total pack years: 7.50     Types: Cigarettes    Smokeless tobacco: Never   Substance and Sexual Activity    Alcohol use: Yes     Comment: 1-2 glasses of wine nightly    Drug use: Never    Sexual activity: None   Other Topics Concern    None   Social History Narrative    None     Social Determinants of Health     Financial Resource Strain: Not on file   Food Insecurity: Not on file   Transportation Needs: Not on file   Physical Activity: Not on file   Stress: Not on file   Social Connections: Not on file   Intimate Partner Violence: Not on file   Housing Stability: Not on file       SCREENINGS                        PHYSICAL EXAM    (up to 7 for level 4, 8 or more for level 5)     ED Triage Vitals [01/29/24 1404]   Temperature Heart Rate Respirations BP   36.4 °C (97.5 °F) (!) 117 15 (!) 213/101      Pulse Ox Temp src Heart Rate Source Patient Position   99 % -- -- --      BP Location FiO2 (%)     -- --       Physical Exam  Constitutional:       General: She is not in acute distress.     Appearance: Normal appearance. She is not ill-appearing, toxic-appearing or diaphoretic.   HENT:      Head: Normocephalic and atraumatic.      Nose: No congestion or rhinorrhea.   Eyes:      General: No scleral icterus.        Right eye: No discharge.         Left eye: No discharge.      Extraocular Movements: Extraocular movements intact.      Conjunctiva/sclera: Conjunctivae normal.   Cardiovascular:      Rate and Rhythm: Normal rate and regular rhythm.      Pulses: Normal pulses.      Heart sounds: Normal heart sounds. No murmur heard.     No friction rub. No gallop.   Pulmonary:      Effort: Pulmonary effort is normal. No respiratory distress.      Breath sounds: Normal breath sounds. No stridor. No wheezing, rhonchi or rales.   Chest:      Chest wall: No tenderness.   Abdominal:      General: There is no distension.      Tenderness: There is no abdominal  tenderness. There is no guarding or rebound.   Musculoskeletal:         General: No swelling, tenderness, deformity or signs of injury. Normal range of motion.      Cervical back: Normal range of motion and neck supple. No rigidity or tenderness.   Lymphadenopathy:      Cervical: No cervical adenopathy.   Neurological:      General: No focal deficit present.      Mental Status: She is alert.      Motor: No weakness.   Psychiatric:         Mood and Affect: Mood normal.         Behavior: Behavior normal.         Thought Content: Thought content normal.         Judgment: Judgment normal.        DIAGNOSTIC RESULTS     LABS:  Labs Reviewed   CBC WITH AUTO DIFFERENTIAL - Abnormal       Result Value    WBC 10.0      nRBC 0.0      RBC 3.59 (*)     Hemoglobin 11.9 (*)     Hematocrit 35.7 (*)     MCV 99      MCH 33.1      MCHC 33.3      RDW 12.5      Platelets 195      Neutrophils % 68.6      Immature Granulocytes %, Automated 0.3      Lymphocytes % 23.4      Monocytes % 6.4      Eosinophils % 0.9      Basophils % 0.4      Neutrophils Absolute 6.88      Immature Granulocytes Absolute, Automated 0.03      Lymphocytes Absolute 2.35      Monocytes Absolute 0.64      Eosinophils Absolute 0.09      Basophils Absolute 0.04     COMPREHENSIVE METABOLIC PANEL - Abnormal    Glucose 90      Sodium 141      Potassium 3.4 (*)     Chloride 105      Bicarbonate 25      Anion Gap 14      Urea Nitrogen 16      Creatinine 0.74      eGFR 89      Calcium 9.1      Albumin 4.2      Alkaline Phosphatase 74      Total Protein 6.3 (*)     AST 30      Bilirubin, Total 0.6      ALT 19     TROPONIN I, HIGH SENSITIVITY - Normal    Troponin I, High Sensitivity 7      Narrative:     Less than 99th percentile of normal range cutoff-  Female and children under 18 years old <14 ng/L; Male <21 ng/L: Negative  Repeat testing should be performed if clinically indicated.     Female and children under 18 years old 14-50 ng/L; Male 21-50 ng/L:  Consistent with  possible cardiac damage and possible increased clinical   risk. Serial measurements may help to assess extent of myocardial damage.     >50 ng/L: Consistent with cardiac damage, increased clinical risk and  myocardial infarction. Serial measurements may help assess extent of   myocardial damage.      NOTE: Children less than 1 year old may have higher baseline troponin   levels and results should be interpreted in conjunction with the overall   clinical context.     NOTE: Troponin I testing is performed using a different   testing methodology at [facility] than at other   [facility]. Direct result comparisons should only   be made within the same method.   D-DIMER, NON VTE - Normal    D-Dimer Non VTE, Quant (ng/mL FEU) 485      Narrative:     The D-Dimer assay is reported in ng/mL Fibrinogen Equivalent Units (FEU). The results of this assay should NOT be used for the exclusion of Deep Vein Thrombosis and/or Pulmonary Embolism.   D-DIMER, VTE EXCLUSION - Normal    D-Dimer, Quantitative VTE Exclusion 448      Narrative:     The VTE Exclusion D-Dimer assay is reported in ng/mL Fibrinogen Equivalent Units (FEU).    Per 's instructions for use, a value of less than 500 ng/mL (FEU) may help to exclude DVT or PE in outpatients when the assay is used with a clinical pretest probability assessment.([facility] must utilize and document eCalc 'Wells Score Deep Vein Thrombosis Risk' for DVT exclusion only. Emergency Department should utilize  Guidelines for Emergency Department Use of the VTE Exclusion D-Dimer and Clinical Pretest probability assessment model for DVT or PE exclusion.)   TROPONIN I, HIGH SENSITIVITY   MAGNESIUM   URINALYSIS WITH REFLEX MICROSCOPIC   MICROSCOPIC ONLY, URINE       All other labs were within normal range or not returned as of this dictation.    Imaging  CT head wo IV contrast    (Results Pending)        Procedures  Procedures     EMERGENCY DEPARTMENT COURSE/MDM:     ED  Course as of 01/29/24 2056 Mon Jan 29, 2024 2023 Patient was found to have potassium of 3.4, magnesium was 1.2.  These were replaced orally in the emergency department.  Patient's blood pressure did trend down.  CT imaging is negative.  Troponin is negative x 2.  D-dimer is negative.  Discussed these findings with the patient.  Patient feels improved.  Patient is comfortable to plan for discharge.  Discussed follow-up with primary care physician for starting a low-dose amlodipine.  Amlodipine will be sent to the patient's pharmacy.  She is to follow-up with her primary care physician tomorrow.  Recommend starting medication if she is unable to see her primary care physician.  Patient is given strict return precautions.  She understands and agrees with discharge plan. [PS]      ED Course User Index  [PS] Ramiro Hopper,          Diagnoses as of 01/29/24 2056   Hypertension, unspecified type   Chronic nonintractable headache, unspecified headache type   Hypokalemia   Hypomagnesemia        Medical Decision Making  The patient received a CBC and CMP to look for white blood cell or electrolyte abnormalities.  CBC is did not show any significant abnormalities only mild anemia.  CMP showed that the patient had low potassium and magnesium.  The patient's magnesium and potassium were replaced in the emergency department.  The patient also also taking potassium pills as a home medication. The patient's EKG shows sinus tachycardia with premature atrial complexes with which may be new from her previous EKGs.  The patient's CT scan of the head without contrast did not show any acute intracranial processes.  The patient was discharged home with instructions for follow-up with her primary care physician. The patient was prescribed amlodipine to better control her blood pressure.        Patient and or family in agreement and understanding of treatment plan.  All questions answered.      I reviewed the case with the  attending ED physician. The attending ED physician agrees with the plan. Patient and/or patient´s representative was counseled regarding labs, imaging, likely diagnosis, and plan. All questions were answered.    ED Medications administered this visit:    Medications   potassium chloride CR (Klor-Con M20) ER tablet 40 mEq (has no administration in time range)       New Prescriptions from this visit:    New Prescriptions    No medications on file       Follow-up:  No follow-up provider specified.      Final Impression: No diagnosis found.      (Please note that portions of this note were completed with a voice recognition program.  Efforts were made to edit the dictations but occasionally words are mis-transcribed.)     Esteban Falcon MD  Resident  01/29/24 [id number]

## 2024-01-30 NOTE — DISCHARGE INSTRUCTIONS
Follow up with your primary care physisian.  Take medication as prescribed. Seek immediate medical attention with any worsening symptoms, chest pain, shortness of breath, visual changes, numbness, tingling or for any reason.

## 2024-01-31 LAB
ATRIAL RATE: 115 BPM
P AXIS: 68 DEGREES
P OFFSET: 213 MS
P ONSET: 159 MS
PR INTERVAL: 128 MS
Q ONSET: 223 MS
QRS COUNT: 19 BEATS
QRS DURATION: 68 MS
QT INTERVAL: 328 MS
QTC CALCULATION(BAZETT): 453 MS
QTC FREDERICIA: 407 MS
R AXIS: 61 DEGREES
T AXIS: 84 DEGREES
T OFFSET: 387 MS
VENTRICULAR RATE: 115 BPM

## 2024-03-05 ENCOUNTER — APPOINTMENT (OUTPATIENT)
Dept: PRIMARY CARE | Facility: CLINIC | Age: 69
End: 2024-03-05
Payer: MEDICARE

## 2024-03-05 PROBLEM — G96.08 SUBDURAL HYGROMA: Status: ACTIVE | Noted: 2024-03-05

## 2024-03-05 PROBLEM — G89.29 CHRONIC LOW BACK PAIN: Status: ACTIVE | Noted: 2024-03-05

## 2024-03-05 PROBLEM — M06.9 RHEUMATOID ARTHRITIS INVOLVING MULTIPLE SITES (MULTI): Status: ACTIVE | Noted: 2024-03-05

## 2024-03-05 PROBLEM — M54.50 CHRONIC LOW BACK PAIN: Status: ACTIVE | Noted: 2024-03-05

## 2024-03-07 ENCOUNTER — OFFICE VISIT (OUTPATIENT)
Dept: PRIMARY CARE | Facility: CLINIC | Age: 69
End: 2024-03-07
Payer: MEDICARE

## 2024-03-07 VITALS
HEIGHT: 62 IN | TEMPERATURE: 97.4 F | BODY MASS INDEX: 16.2 KG/M2 | DIASTOLIC BLOOD PRESSURE: 66 MMHG | SYSTOLIC BLOOD PRESSURE: 102 MMHG | WEIGHT: 88 LBS | HEART RATE: 80 BPM

## 2024-03-07 DIAGNOSIS — Z91.81 RISK FOR FALLS: ICD-10-CM

## 2024-03-07 DIAGNOSIS — Z71.89 GOALS OF CARE, COUNSELING/DISCUSSION: ICD-10-CM

## 2024-03-07 DIAGNOSIS — I10 PRIMARY HYPERTENSION: ICD-10-CM

## 2024-03-07 DIAGNOSIS — M06.9 RHEUMATOID ARTHRITIS INVOLVING MULTIPLE SITES, UNSPECIFIED WHETHER RHEUMATOID FACTOR PRESENT (MULTI): ICD-10-CM

## 2024-03-07 DIAGNOSIS — Z13.1 DIABETES MELLITUS SCREENING: ICD-10-CM

## 2024-03-07 DIAGNOSIS — Z78.0 ASYMPTOMATIC MENOPAUSAL STATE: ICD-10-CM

## 2024-03-07 DIAGNOSIS — Z13.21 ENCOUNTER FOR VITAMIN DEFICIENCY SCREENING: ICD-10-CM

## 2024-03-07 DIAGNOSIS — Z12.31 ENCOUNTER FOR SCREENING MAMMOGRAM FOR BREAST CANCER: ICD-10-CM

## 2024-03-07 DIAGNOSIS — M54.42 CHRONIC BILATERAL LOW BACK PAIN WITH BILATERAL SCIATICA: ICD-10-CM

## 2024-03-07 DIAGNOSIS — G89.29 CHRONIC BILATERAL LOW BACK PAIN WITH BILATERAL SCIATICA: ICD-10-CM

## 2024-03-07 DIAGNOSIS — Z87.891 PERSONAL HISTORY OF TOBACCO USE, PRESENTING HAZARDS TO HEALTH: ICD-10-CM

## 2024-03-07 DIAGNOSIS — M54.41 CHRONIC BILATERAL LOW BACK PAIN WITH BILATERAL SCIATICA: ICD-10-CM

## 2024-03-07 DIAGNOSIS — Z12.11 SCREENING FOR COLORECTAL CANCER: ICD-10-CM

## 2024-03-07 DIAGNOSIS — Z00.00 ROUTINE GENERAL MEDICAL EXAMINATION AT HEALTH CARE FACILITY: Primary | ICD-10-CM

## 2024-03-07 DIAGNOSIS — Z12.12 SCREENING FOR COLORECTAL CANCER: ICD-10-CM

## 2024-03-07 DIAGNOSIS — E43 SEVERE PROTEIN-CALORIE MALNUTRITION (MULTI): ICD-10-CM

## 2024-03-07 PROBLEM — F17.200 SMOKER UNMOTIVATED TO QUIT: Status: ACTIVE | Noted: 2024-03-07

## 2024-03-07 PROBLEM — R55 SYNCOPE: Status: RESOLVED | Noted: 2024-02-26 | Resolved: 2024-03-07

## 2024-03-07 PROBLEM — R30.0 DYSURIA: Status: RESOLVED | Noted: 2024-03-07 | Resolved: 2024-03-07

## 2024-03-07 PROBLEM — N89.8 VAGINAL DISCHARGE: Status: RESOLVED | Noted: 2024-03-07 | Resolved: 2024-03-07

## 2024-03-07 PROBLEM — S22.31XA CLOSED FRACTURE OF ONE RIB OF RIGHT SIDE: Status: RESOLVED | Noted: 2024-03-07 | Resolved: 2024-03-07

## 2024-03-07 PROBLEM — N95.0 POSTMENOPAUSAL BLEEDING: Status: RESOLVED | Noted: 2024-03-07 | Resolved: 2024-03-07
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
## 2024-03-07 PROCEDURE — 99497 ADVNCD CARE PLAN 30 MIN: CPT | Performed by: NURSE PRACTITIONER

## 2024-03-07 PROCEDURE — G0439 PPPS, SUBSEQ VISIT: HCPCS | Performed by: NURSE PRACTITIONER

## 2024-03-07 RX ORDER — OMEPRAZOLE 20 MG/1
20 TABLET, DELAYED RELEASE ORAL
COMMUNITY
Start: 2014-07-08

## 2024-03-07 RX ORDER — METOPROLOL SUCCINATE 25 MG/1
1 TABLET, EXTENDED RELEASE ORAL
COMMUNITY
Start: 2024-01-30 | End: 2024-05-15 | Stop reason: ALTCHOICE

## 2024-03-07 ASSESSMENT — ACTIVITIES OF DAILY LIVING (ADL)
DOING_HOUSEWORK: INDEPENDENT
TAKING_MEDICATION: INDEPENDENT
BATHING: INDEPENDENT
GROCERY_SHOPPING: INDEPENDENT
DRESSING: INDEPENDENT
MANAGING_FINANCES: INDEPENDENT

## 2024-03-07 ASSESSMENT — ENCOUNTER SYMPTOMS
SHORTNESS OF BREATH: 0
FATIGUE: 0
CHILLS: 0
SEIZURES: 0
UNEXPECTED WEIGHT CHANGE: 1
PALPITATIONS: 0
HEADACHES: 1
NUMBNESS: 1
COUGH: 1
BACK PAIN: 1
FEVER: 0
NECK PAIN: 1
DIZZINESS: 1
ARTHRALGIAS: 1
WEAKNESS: 1

## 2024-03-07 ASSESSMENT — PATIENT HEALTH QUESTIONNAIRE - PHQ9
1. LITTLE INTEREST OR PLEASURE IN DOING THINGS: NOT AT ALL
SUM OF ALL RESPONSES TO PHQ9 QUESTIONS 1 AND 2: 0
2. FEELING DOWN, DEPRESSED OR HOPELESS: NOT AT ALL

## 2024-03-07 NOTE — ASSESSMENT & PLAN NOTE
Pt reports a 42 pound weight loss over the last 4 years, which she attributes to high stress level from caring for aging parents. Her father has since . Pt reports she is now taking Ensure supplements regularly and is starting to gain weight slowly.

## 2024-03-07 NOTE — PATIENT INSTRUCTIONS

## 2024-03-07 NOTE — PROGRESS NOTES
"Subjective   Reason for Visit: Sang Joy is an 68 y.o. female here for a Medicare Wellness visit.       HPI  The patient reports 2 falls in the last year. She has appointments scheduled with neurology Dr. Shumei Man and will have MRI/MRA next and a TTT in 8/2024. The patient will have her Shingles vaccine at [facility]. Aware it is a 2-dose series 2-6 months apart.  Patient Care Team:  KERRY Prabhakar-CNP as PCP - General (Gerontology)   Dr. Allison-Rheumatology  Dr. Shumei Man-neurology  Dr. Greg Reynolds-[facility]  Review of Systems   Constitutional:  Positive for unexpected weight change (pt believes stress related d/t caring for elderly parents. Has lost 42 lbs since 2020, but is starting to gain weight now.). Negative for chills, fatigue and fever.   Respiratory:  Positive for cough (allergies). Negative for shortness of breath.    Cardiovascular:  Negative for chest pain, palpitations and leg swelling.   Musculoskeletal:  Positive for arthralgias, back pain and neck pain. Negative for gait problem.   Neurological:  Positive for dizziness (occasional before falls), syncope, weakness (all extremities d/t arthritis), numbness (hands d/t carpal tunnel and legs d/t chronic LBP w/ neuropathy) and headaches (migraines 1-2 per year now, used to be monthy). Negative for seizures.   Objective   Vitals:  /66   Pulse 80   Temp 36.3 °C (97.4 °F)   Ht 1.575 m (5' 2\")   Wt (!) 39.9 kg (88 lb)   BMI 16.10 kg/m²     Physical Exam  Vitals reviewed.   Constitutional:       Appearance: Normal appearance.   HENT:      Head: Normocephalic.   Eyes:      Conjunctiva/sclera: Conjunctivae normal.   Cardiovascular:      Rate and Rhythm: Normal rate and regular rhythm.      Pulses: Normal pulses.   Pulmonary:      Breath sounds: Normal breath sounds.   Abdominal:      General: Bowel sounds are normal.      Palpations: Abdomen is soft.   Musculoskeletal:      Cervical back: Neck supple. "   Skin:     General: Skin is warm and dry.   Neurological:      General: No focal deficit present.      Mental Status: She is alert.   Psychiatric:         Mood and Affect: Mood normal.         Thought Content: Thought content normal.     Assessment/Plan   Problem List Items Addressed This Visit       Rheumatoid arthritis involving multiple sites (CMS/[facility])    Chronic low back pain    Primary hypertension    Relevant Orders    CBC    Thyroid Stimulating Hormone    Lipid Panel    Risk for falls    Current Assessment & Plan     At high risk for falls and has had 2 falls in the last year. She is scheduled with neurology to address this.   Poor nutrition may also play a role, as she has lost 42 pounds in the last 4 years. She is taking oral nutritional supplements now with some improvement.            Severe protein-calorie malnutrition (CMS/[facility])    Current Assessment & Plan     Pt reports a 42 pound weight loss over the last 4 years, which she attributes to high stress level from caring for aging parents. Her father has since . Pt reports she is now taking Ensure supplements regularly and is starting to gain weight slowly.           Other Visit Diagnoses       Routine general medical examination at health care facility    -  Primary    Relevant Orders    1 Year Follow Up In [facility] - PCP - Wellness Exam    Thyroid Stimulating Hormone    Diabetes mellitus screening        Relevant Orders    Comprehensive Metabolic Panel    Personal history of tobacco use, presenting hazards to health        Relevant Orders    CT lung screening low dose    Follow Up In [facility] - PCP - Established    Asymptomatic menopausal state        Relevant Orders    XR DEXA bone density    Encounter for screening mammogram for breast cancer        Relevant Orders    BI mammo bilateral screening tomosynthesis    Screening for colorectal cancer        Relevant Orders    Cologuard® colon cancer screening    Encounter for  vitamin deficiency screening        Relevant Orders    Vitamin B12    Vitamin D 1,25 Dihydroxy (for eval of hypercalcemia)    Goals of care, counseling/discussion        Pt. States she is DNR comfort care and is going to see  this afternoon to have POA-H and DNR paperwork completed. She will bring paperwork later.    Relevant Orders    DNR Comfort Measures Only (Completed)        F/U in 6 months     Patient was identified as a fall risk. Risk prevention instructions provided.

## 2024-03-07 NOTE — ASSESSMENT & PLAN NOTE
At high risk for falls and has had 2 falls in the last year. She is scheduled with neurology to address this.   Poor nutrition may also play a role, as she has lost 42 pounds in the last 4 years. She is taking oral nutritional supplements now with some improvement.

## 2024-03-09 ENCOUNTER — APPOINTMENT (OUTPATIENT)
Dept: RADIOLOGY | Facility: HOSPITAL | Age: 69
DRG: 683 | End: 2024-03-09
Payer: MEDICARE

## 2024-03-09 ENCOUNTER — HOSPITAL ENCOUNTER (INPATIENT)
Facility: HOSPITAL | Age: 69
LOS: 2 days | Discharge: HOME | DRG: 683 | End: 2024-03-11
Attending: STUDENT IN AN ORGANIZED HEALTH CARE EDUCATION/TRAINING PROGRAM | Admitting: INTERNAL MEDICINE
Payer: MEDICARE

## 2024-03-09 ENCOUNTER — APPOINTMENT (OUTPATIENT)
Dept: CARDIOLOGY | Facility: HOSPITAL | Age: 69
DRG: 683 | End: 2024-03-09
Payer: MEDICARE

## 2024-03-09 DIAGNOSIS — E83.42 HYPOMAGNESEMIA: ICD-10-CM

## 2024-03-09 DIAGNOSIS — E87.1 HYPONATREMIA: ICD-10-CM

## 2024-03-09 DIAGNOSIS — D64.9 SYMPTOMATIC ANEMIA: ICD-10-CM

## 2024-03-09 DIAGNOSIS — R55 SYNCOPE AND COLLAPSE: Primary | ICD-10-CM

## 2024-03-09 DIAGNOSIS — N17.9 AKI (ACUTE KIDNEY INJURY) (CMS-HCC): ICD-10-CM

## 2024-03-09 LAB
ALBUMIN SERPL BCP-MCNC: 3.7 G/DL (ref 3.4–5)
ALP SERPL-CCNC: 73 U/L (ref 33–136)
ALT SERPL W P-5'-P-CCNC: 21 U/L (ref 7–45)
ANION GAP SERPL CALC-SCNC: 14 MMOL/L
APTT PPP: 25 SECONDS (ref 27–38)
AST SERPL W P-5'-P-CCNC: 38 U/L (ref 9–39)
BASOPHILS # BLD AUTO: 0.03 X10*3/UL (ref 0–0.1)
BASOPHILS NFR BLD AUTO: 0.3 %
BILIRUB SERPL-MCNC: 0.7 MG/DL (ref 0–1.2)
BNP SERPL-MCNC: 120 PG/ML (ref 0–99)
BUN SERPL-MCNC: 29 MG/DL (ref 6–23)
CALCIUM SERPL-MCNC: 8.5 MG/DL (ref 8.6–10.3)
CARDIAC TROPONIN I PNL SERPL HS: 5 NG/L (ref 0–13)
CHLORIDE SERPL-SCNC: 100 MMOL/L (ref 98–107)
CO2 SERPL-SCNC: 19 MMOL/L (ref 21–32)
CREAT SERPL-MCNC: 1.48 MG/DL (ref 0.5–1.05)
D DIMER PPP FEU-MCNC: 410 NG/ML FEU
EGFRCR SERPLBLD CKD-EPI 2021: 39 ML/MIN/1.73M*2
EOSINOPHIL # BLD AUTO: 0.03 X10*3/UL (ref 0–0.7)
EOSINOPHIL NFR BLD AUTO: 0.3 %
ERYTHROCYTE [DISTWIDTH] IN BLOOD BY AUTOMATED COUNT: 12.6 % (ref 11.5–14.5)
GLUCOSE SERPL-MCNC: 178 MG/DL (ref 74–99)
HCT VFR BLD AUTO: 27.6 % (ref 36–46)
HGB BLD-MCNC: 9.2 G/DL (ref 12–16)
HGB RETIC QN: 37 PG (ref 28–38)
IMM GRANULOCYTES # BLD AUTO: 0.03 X10*3/UL (ref 0–0.7)
IMM GRANULOCYTES NFR BLD AUTO: 0.3 % (ref 0–0.9)
IMMATURE RETIC FRACTION: 1.7 %
INR PPP: 0.9 (ref 0.9–1.1)
LDH SERPL L TO P-CCNC: 183 U/L (ref 84–246)
LYMPHOCYTES # BLD AUTO: 1.67 X10*3/UL (ref 1.2–4.8)
LYMPHOCYTES NFR BLD AUTO: 17.2 %
MAGNESIUM SERPL-MCNC: 1.4 MG/DL (ref 1.6–2.4)
MCH RBC QN AUTO: 33.3 PG (ref 26–34)
MCHC RBC AUTO-ENTMCNC: 33.3 G/DL (ref 32–36)
MCV RBC AUTO: 100 FL (ref 80–100)
MONOCYTES # BLD AUTO: 0.63 X10*3/UL (ref 0.1–1)
MONOCYTES NFR BLD AUTO: 6.5 %
NEUTROPHILS # BLD AUTO: 7.31 X10*3/UL (ref 1.2–7.7)
NEUTROPHILS NFR BLD AUTO: 75.4 %
NRBC BLD-RTO: 0 /100 WBCS (ref 0–0)
PLATELET # BLD AUTO: 186 X10*3/UL (ref 150–450)
POTASSIUM SERPL-SCNC: 3.5 MMOL/L (ref 3.5–5.3)
PROT SERPL-MCNC: 5.6 G/DL (ref 6.4–8.2)
PROTHROMBIN TIME: 10.3 SECONDS (ref 9.8–12.8)
RBC # BLD AUTO: 2.76 X10*6/UL (ref 4–5.2)
RETICS #: 0.03 X10*6/UL (ref 0.02–0.11)
RETICS/RBC NFR AUTO: 0.9 % (ref 0.5–2)
SODIUM SERPL-SCNC: 129 MMOL/L (ref 136–145)
TSH SERPL-ACNC: 2.79 MIU/L (ref 0.44–3.98)
WBC # BLD AUTO: 9.7 X10*3/UL (ref 4.4–11.3)

## 2024-03-09 PROCEDURE — 70450 CT HEAD/BRAIN W/O DYE: CPT | Performed by: STUDENT IN AN ORGANIZED HEALTH CARE EDUCATION/TRAINING PROGRAM

## 2024-03-09 PROCEDURE — 84443 ASSAY THYROID STIM HORMONE: CPT | Performed by: EMERGENCY MEDICINE

## 2024-03-09 PROCEDURE — 2500000004 HC RX 250 GENERAL PHARMACY W/ HCPCS (ALT 636 FOR OP/ED): Performed by: NURSE PRACTITIONER

## 2024-03-09 PROCEDURE — 96361 HYDRATE IV INFUSION ADD-ON: CPT

## 2024-03-09 PROCEDURE — 83615 LACTATE (LD) (LDH) ENZYME: CPT | Performed by: EMERGENCY MEDICINE

## 2024-03-09 PROCEDURE — 72125 CT NECK SPINE W/O DYE: CPT | Performed by: STUDENT IN AN ORGANIZED HEALTH CARE EDUCATION/TRAINING PROGRAM

## 2024-03-09 PROCEDURE — 36415 COLL VENOUS BLD VENIPUNCTURE: CPT | Performed by: EMERGENCY MEDICINE

## 2024-03-09 PROCEDURE — 85730 THROMBOPLASTIN TIME PARTIAL: CPT | Performed by: EMERGENCY MEDICINE

## 2024-03-09 PROCEDURE — 2500000001 HC RX 250 WO HCPCS SELF ADMINISTERED DRUGS (ALT 637 FOR MEDICARE OP): Performed by: NURSE PRACTITIONER

## 2024-03-09 PROCEDURE — 2500000002 HC RX 250 W HCPCS SELF ADMINISTERED DRUGS (ALT 637 FOR MEDICARE OP, ALT 636 FOR OP/ED): Performed by: NURSE PRACTITIONER

## 2024-03-09 PROCEDURE — 93010 ELECTROCARDIOGRAM REPORT: CPT | Performed by: STUDENT IN AN ORGANIZED HEALTH CARE EDUCATION/TRAINING PROGRAM

## 2024-03-09 PROCEDURE — 93005 ELECTROCARDIOGRAM TRACING: CPT

## 2024-03-09 PROCEDURE — 84484 ASSAY OF TROPONIN QUANT: CPT | Performed by: EMERGENCY MEDICINE

## 2024-03-09 PROCEDURE — 85610 PROTHROMBIN TIME: CPT | Performed by: EMERGENCY MEDICINE

## 2024-03-09 PROCEDURE — 70450 CT HEAD/BRAIN W/O DYE: CPT

## 2024-03-09 PROCEDURE — 83735 ASSAY OF MAGNESIUM: CPT | Performed by: EMERGENCY MEDICINE

## 2024-03-09 PROCEDURE — 93010 ELECTROCARDIOGRAM REPORT: CPT | Performed by: INTERNAL MEDICINE

## 2024-03-09 PROCEDURE — 99285 EMERGENCY DEPT VISIT HI MDM: CPT | Mod: 25

## 2024-03-09 PROCEDURE — 83880 ASSAY OF NATRIURETIC PEPTIDE: CPT | Performed by: EMERGENCY MEDICINE

## 2024-03-09 PROCEDURE — 83010 ASSAY OF HAPTOGLOBIN QUANT: CPT | Performed by: EMERGENCY MEDICINE

## 2024-03-09 PROCEDURE — 71045 X-RAY EXAM CHEST 1 VIEW: CPT

## 2024-03-09 PROCEDURE — 85379 FIBRIN DEGRADATION QUANT: CPT | Performed by: EMERGENCY MEDICINE

## 2024-03-09 PROCEDURE — 80053 COMPREHEN METABOLIC PANEL: CPT | Performed by: EMERGENCY MEDICINE

## 2024-03-09 PROCEDURE — 85045 AUTOMATED RETICULOCYTE COUNT: CPT | Performed by: EMERGENCY MEDICINE

## 2024-03-09 PROCEDURE — 72125 CT NECK SPINE W/O DYE: CPT

## 2024-03-09 PROCEDURE — 99222 1ST HOSP IP/OBS MODERATE 55: CPT | Performed by: NURSE PRACTITIONER

## 2024-03-09 PROCEDURE — 85025 COMPLETE CBC W/AUTO DIFF WBC: CPT | Performed by: EMERGENCY MEDICINE

## 2024-03-09 PROCEDURE — 99285 EMERGENCY DEPT VISIT HI MDM: CPT | Performed by: STUDENT IN AN ORGANIZED HEALTH CARE EDUCATION/TRAINING PROGRAM

## 2024-03-09 PROCEDURE — 83036 HEMOGLOBIN GLYCOSYLATED A1C: CPT | Mod: STJLAB | Performed by: NURSE PRACTITIONER

## 2024-03-09 PROCEDURE — 2500000004 HC RX 250 GENERAL PHARMACY W/ HCPCS (ALT 636 FOR OP/ED): Performed by: EMERGENCY MEDICINE

## 2024-03-09 PROCEDURE — 71045 X-RAY EXAM CHEST 1 VIEW: CPT | Performed by: RADIOLOGY

## 2024-03-09 PROCEDURE — 1200000002 HC GENERAL ROOM WITH TELEMETRY DAILY

## 2024-03-09 PROCEDURE — 96360 HYDRATION IV INFUSION INIT: CPT

## 2024-03-09 RX ORDER — MECLIZINE HCL 12.5 MG 12.5 MG/1
12.5 TABLET ORAL 3 TIMES DAILY PRN
COMMUNITY
End: 2024-03-14 | Stop reason: SDUPTHER

## 2024-03-09 RX ORDER — MAGNESIUM SULFATE HEPTAHYDRATE 40 MG/ML
2 INJECTION, SOLUTION INTRAVENOUS ONCE
Status: COMPLETED | OUTPATIENT
Start: 2024-03-09 | End: 2024-03-09

## 2024-03-09 RX ORDER — SODIUM CHLORIDE 0.9 % (FLUSH) 0.9 %
10 SYRINGE (ML) INJECTION EVERY 8 HOURS SCHEDULED
Status: DISCONTINUED | OUTPATIENT
Start: 2024-03-09 | End: 2024-03-11 | Stop reason: HOSPADM

## 2024-03-09 RX ORDER — ASPIRIN 325 MG
500 TABLET, DELAYED RELEASE (ENTERIC COATED) ORAL DAILY
COMMUNITY

## 2024-03-09 RX ORDER — CALCIUM CARB/VITAMIN D3/VIT K1 650MG-12.5
2 TABLET,CHEWABLE ORAL DAILY
COMMUNITY

## 2024-03-09 RX ORDER — TALC
3 POWDER (GRAM) TOPICAL NIGHTLY PRN
Status: DISCONTINUED | OUTPATIENT
Start: 2024-03-09 | End: 2024-03-11 | Stop reason: HOSPADM

## 2024-03-09 RX ORDER — POLYETHYLENE GLYCOL 3350 17 G/17G
17 POWDER, FOR SOLUTION ORAL DAILY PRN
Status: DISCONTINUED | OUTPATIENT
Start: 2024-03-09 | End: 2024-03-11 | Stop reason: HOSPADM

## 2024-03-09 RX ORDER — POTASSIUM CHLORIDE 20 MEQ/1
20 TABLET, EXTENDED RELEASE ORAL ONCE
Status: COMPLETED | OUTPATIENT
Start: 2024-03-09 | End: 2024-03-09

## 2024-03-09 RX ORDER — FUROSEMIDE 40 MG/1
40 TABLET ORAL DAILY PRN
COMMUNITY

## 2024-03-09 RX ORDER — CALCIUM CARBONATE 300MG(750)
400 TABLET,CHEWABLE ORAL DAILY
COMMUNITY

## 2024-03-09 RX ORDER — SODIUM CHLORIDE, SODIUM LACTATE, POTASSIUM CHLORIDE, CALCIUM CHLORIDE 600; 310; 30; 20 MG/100ML; MG/100ML; MG/100ML; MG/100ML
50 INJECTION, SOLUTION INTRAVENOUS CONTINUOUS
Status: DISCONTINUED | OUTPATIENT
Start: 2024-03-09 | End: 2024-03-10

## 2024-03-09 RX ORDER — CHOLECALCIFEROL (VITAMIN D3) 50 MCG
50 TABLET ORAL DAILY
COMMUNITY

## 2024-03-09 RX ORDER — ONDANSETRON HYDROCHLORIDE 2 MG/ML
4 INJECTION, SOLUTION INTRAVENOUS EVERY 8 HOURS PRN
Status: DISCONTINUED | OUTPATIENT
Start: 2024-03-09 | End: 2024-03-11 | Stop reason: HOSPADM

## 2024-03-09 RX ORDER — ONDANSETRON 4 MG/1
4 TABLET, ORALLY DISINTEGRATING ORAL EVERY 8 HOURS PRN
Status: DISCONTINUED | OUTPATIENT
Start: 2024-03-09 | End: 2024-03-11 | Stop reason: HOSPADM

## 2024-03-09 RX ADMIN — MAGNESIUM SULFATE HEPTAHYDRATE 2 G: 40 INJECTION, SOLUTION INTRAVENOUS at 20:37

## 2024-03-09 RX ADMIN — Medication 1 CAPSULE: at 20:42

## 2024-03-09 RX ADMIN — SODIUM CHLORIDE, POTASSIUM CHLORIDE, SODIUM LACTATE AND CALCIUM CHLORIDE 50 ML/HR: 600; 310; 30; 20 INJECTION, SOLUTION INTRAVENOUS at 20:40

## 2024-03-09 RX ADMIN — POTASSIUM CHLORIDE 20 MEQ: 1500 TABLET, EXTENDED RELEASE ORAL at 20:42

## 2024-03-09 RX ADMIN — Medication 10 ML: at 21:03

## 2024-03-09 RX ADMIN — SODIUM CHLORIDE 1000 ML: 9 INJECTION, SOLUTION INTRAVENOUS at 17:31

## 2024-03-09 SDOH — SOCIAL STABILITY: SOCIAL INSECURITY: HAVE YOU HAD THOUGHTS OF HARMING ANYONE ELSE?: NO

## 2024-03-09 SDOH — SOCIAL STABILITY: SOCIAL INSECURITY: ARE YOU OR HAVE YOU BEEN THREATENED OR ABUSED PHYSICALLY, EMOTIONALLY, OR SEXUALLY BY ANYONE?: NO

## 2024-03-09 SDOH — SOCIAL STABILITY: SOCIAL INSECURITY: DO YOU FEEL UNSAFE GOING BACK TO THE PLACE WHERE YOU ARE LIVING?: NO

## 2024-03-09 SDOH — SOCIAL STABILITY: SOCIAL INSECURITY: DO YOU FEEL ANYONE HAS EXPLOITED OR TAKEN ADVANTAGE OF YOU FINANCIALLY OR OF YOUR PERSONAL PROPERTY?: NO

## 2024-03-09 SDOH — SOCIAL STABILITY: SOCIAL INSECURITY: DOES ANYONE TRY TO KEEP YOU FROM HAVING/CONTACTING OTHER FRIENDS OR DOING THINGS OUTSIDE YOUR HOME?: NO

## 2024-03-09 SDOH — SOCIAL STABILITY: SOCIAL INSECURITY: ARE THERE ANY APPARENT SIGNS OF INJURIES/BEHAVIORS THAT COULD BE RELATED TO ABUSE/NEGLECT?: NO

## 2024-03-09 SDOH — SOCIAL STABILITY: SOCIAL INSECURITY: ABUSE: ADULT

## 2024-03-09 SDOH — SOCIAL STABILITY: SOCIAL INSECURITY: WERE YOU ABLE TO COMPLETE ALL THE BEHAVIORAL HEALTH SCREENINGS?: YES

## 2024-03-09 SDOH — SOCIAL STABILITY: SOCIAL INSECURITY: HAS ANYONE EVER THREATENED TO HURT YOUR FAMILY OR YOUR PETS?: NO

## 2024-03-09 ASSESSMENT — ACTIVITIES OF DAILY LIVING (ADL)
HEARING - LEFT EAR: FUNCTIONAL
LACK_OF_TRANSPORTATION: NO
GROOMING: INDEPENDENT
TOILETING: INDEPENDENT
PATIENT'S MEMORY ADEQUATE TO SAFELY COMPLETE DAILY ACTIVITIES?: YES
HEARING - RIGHT EAR: FUNCTIONAL
BATHING: INDEPENDENT
WALKS IN HOME: INDEPENDENT
ASSISTIVE_DEVICE: WALKER
FEEDING YOURSELF: INDEPENDENT
DRESSING YOURSELF: INDEPENDENT
JUDGMENT_ADEQUATE_SAFELY_COMPLETE_DAILY_ACTIVITIES: YES
ADEQUATE_TO_COMPLETE_ADL: YES

## 2024-03-09 ASSESSMENT — PATIENT HEALTH QUESTIONNAIRE - PHQ9
2. FEELING DOWN, DEPRESSED OR HOPELESS: NOT AT ALL
SUM OF ALL RESPONSES TO PHQ9 QUESTIONS 1 & 2: 0
1. LITTLE INTEREST OR PLEASURE IN DOING THINGS: NOT AT ALL

## 2024-03-09 ASSESSMENT — LIFESTYLE VARIABLES
HAVE PEOPLE ANNOYED YOU BY CRITICIZING YOUR DRINKING: NO
EVER FELT BAD OR GUILTY ABOUT YOUR DRINKING: NO
HAVE YOU EVER FELT YOU SHOULD CUT DOWN ON YOUR DRINKING: NO
HOW OFTEN DURING THE LAST YEAR HAVE YOU FOUND THAT YOU WERE NOT ABLE TO STOP DRINKING ONCE YOU HAD STARTED: NEVER
AUDIT-C TOTAL SCORE: 3
HOW OFTEN DURING THE LAST YEAR HAVE YOU HAD A FEELING OF GUILT OR REMORSE AFTER DRINKING: NEVER
AUDIT TOTAL SCORE: 0
HOW OFTEN DURING THE LAST YEAR HAVE YOU FAILED TO DO WHAT WAS NORMALLY EXPECTED FROM YOU BECAUSE OF DRINKING: NEVER
AUDIT-C TOTAL SCORE: 3
HAS A RELATIVE, FRIEND, DOCTOR, OR ANOTHER HEALTH PROFESSIONAL EXPRESSED CONCERN ABOUT YOUR DRINKING OR SUGGESTED YOU CUT DOWN: NO
HAVE YOU OR SOMEONE ELSE BEEN INJURED AS A RESULT OF YOUR DRINKING: NO
EVER HAD A DRINK FIRST THING IN THE MORNING TO STEADY YOUR NERVES TO GET RID OF A HANGOVER: NO
HOW MANY STANDARD DRINKS CONTAINING ALCOHOL DO YOU HAVE ON A TYPICAL DAY: 1 OR 2
SKIP TO QUESTIONS 9-10: 1
AUDIT TOTAL SCORE: 3
HOW OFTEN DO YOU HAVE A DRINK CONTAINING ALCOHOL: 2-3 TIMES A WEEK
HOW OFTEN DO YOU HAVE 6 OR MORE DRINKS ON ONE OCCASION: NEVER
HOW OFTEN DURING THE LAST YEAR HAVE YOU NEEDED AN ALCOHOLIC DRINK FIRST THING IN THE MORNING TO GET YOURSELF GOING AFTER A NIGHT OF HEAVY DRINKING: NEVER
HOW OFTEN DURING THE LAST YEAR HAVE YOU BEEN UNABLE TO REMEMBER WHAT HAPPENED THE NIGHT BEFORE BECAUSE YOU HAD BEEN DRINKING: NEVER

## 2024-03-09 ASSESSMENT — COGNITIVE AND FUNCTIONAL STATUS - GENERAL
DAILY ACTIVITIY SCORE: 24
MOBILITY SCORE: 24
PATIENT BASELINE BEDBOUND: NO

## 2024-03-09 ASSESSMENT — PAIN - FUNCTIONAL ASSESSMENT
PAIN_FUNCTIONAL_ASSESSMENT: 0-10
PAIN_FUNCTIONAL_ASSESSMENT: 0-10

## 2024-03-09 ASSESSMENT — PAIN DESCRIPTION - PAIN TYPE: TYPE: ACUTE PAIN

## 2024-03-09 ASSESSMENT — PAIN SCALES - GENERAL
PAINLEVEL_OUTOF10: 4
PAINLEVEL_OUTOF10: 8

## 2024-03-09 ASSESSMENT — PAIN DESCRIPTION - LOCATION: LOCATION: ARM

## 2024-03-09 NOTE — ED PROVIDER NOTES
EMERGENCY DEPARTMENT ENCOUNTER      Pt Name: Sang Joy  MRN: [id number]  Birthdate 1955  Date of evaluation: 3/9/2024  Provider: Isaiah Tate DO    CHIEF COMPLAINT       Chief Complaint   Patient presents with    Syncope     HISTORY OF PRESENT ILLNESS    Sang Joy is a 68 y.o. year old female who presents to the ER for syncope x2. Felt short of breath, sweaty, and passed out. No chest pain. No fevers, nausea, vomiting, urinary changes.   Earlier had abdominal pain and nonbloody diarrhea x2 after apple pie. Did get caught by spouse second time.  Not on blood thinners. Did hit her head both times, front first time, back second time. Seeing a neurologist for frequent falls, scheduled for MRI/MRA, tilt table in August. Started metoprolol and amlodipine 01/29  PMH HTN, RA, OA, Osteoporosis  PSH: appy  + 1/2 ppd, 1-2 drinks/night, no drugs     PAST MEDICAL HISTORY     Past Medical History:   Diagnosis Date    Acquired cataract     Anxiety     Asthma     Carpal tunnel syndrome     Cervical spine arthritis     Chronic anemia     Chronic malnutrition (CMS/HCC)     Episode of dizziness     Frequent falls     History of rib fracture     History of syncope     History of uterine leiomyoma     Hypertension     Migraines     Osteoporosis     Rheumatoid arthritis (CMS/HCC)     Spinal stenosis of lumbosacral region with radiculopathy     Vertebrobasilar artery syndrome      CURRENT MEDICATIONS       Current Discharge Medication List        CONTINUE these medications which have NOT CHANGED    Details   ALBUTEROL SULFATE INHL Inhale 2 puffs every 6 hours if needed.      amLODIPine (Norvasc) 5 mg tablet Take 0.5 tablets (2.5 mg) by mouth once daily.  Qty: 15 tablet, Refills: 0    Associated Diagnoses: Hypertension, unspecified type      calcium-vitamin D3-vitamin K (Viactiv) 650 mg-12.5 mcg-40 mcg chewable tablet Chew 2 tablets once daily.      cholecalciferol (Vitamin D3) 50 MCG (2000 UT) tablet Take 1 tablet (50  mcg) by mouth once daily.      fluticasone (Flonase Allergy Relief) 50 mcg/actuation nasal spray 1 sprays, Nasal, DAILY, in each nostril, Refill(s) 0      furosemide (Lasix) 40 mg tablet Take 1 tablet (40 mg) by mouth once daily as needed.      hydroxychloroquine (Plaquenil) 200 mg tablet Take 1 tablet (200 mg) by mouth once daily.      loratadine 10 mg tablet,chewable Chew once every 24 hours.      losartan (Cozaar) 100 mg tablet Take 1 tablet (100 mg) by mouth once daily.      magnesium oxide (Mag-Ox) 400 mg tablet Take 1 tablet (400 mg) by mouth once daily.      meclizine (Antivert) 12.5 mg tablet Take 1 tablet (12.5 mg) by mouth 3 times a day as needed for dizziness.      metoprolol succinate XL (Toprol-XL) 25 mg 24 hr tablet Take 1 tablet (25 mg) by mouth once daily.      multivitamin capsule 1 capsule once daily.      omega-3 (Fish Oil) 60- mg capsule Take 1 capsule (500 mg) by mouth once daily. David red      omeprazole OTC (PriLOSEC OTC) 20 mg EC tablet 1 tablet (20 mg).      POTASSIUM GLUCONATE ORAL Take 90 mg by mouth once daily.           SURGICAL HISTORY       Past Surgical History:   Procedure Laterality Date    APPENDECTOMY      DENTAL SURGERY      DILATION AND CURETTAGE OF UTERUS       ALLERGIES     Clarithromycin; Influenza virus vaccines; Iodine; Penicillins; Acetaminophen; Codeine; Tetanus toxoid, adsorbed; and Amoxicillin  FAMILY HISTORY       Family History   Problem Relation Name Age of Onset    Atrial fibrillation Mother Medina Desmond     Hypertension Mother Medina Desmond     Kidney disease Mother Medina Desmond     Arthritis Father Cruz A Desmond     Atrial fibrillation Father Cruz A Desmond     Cancer Father Cruz A Desmond     Heart disease Father Cruz A Desmond     Hernia Father Cruz A Desmond     Hypertension Father Cruz A Desmond     Hypertension Maternal Grandmother Taina Randall     Stroke Maternal Grandmother Jackiewaldemar Randall     Heart disease Paternal Grandfather Giovanni Desmond      Hypertension Paternal Grandfather Giovanni Logan     Breast cancer Paternal Grandmother Essie Logan     Cancer Paternal Grandmother sEsie Logan     Hypertension Paternal Grandmother Essie Logan     Atrial fibrillation Brother Barry Logan     Hypertension Brother Barry Logan     Hypertension Sister Ana María Denney      SOCIAL HISTORY       Social History     Tobacco Use    Smoking status: Every Day     Packs/day: 0.50     Years: 15.00     Additional pack years: 0.00     Total pack years: 7.50     Types: Cigarettes    Smokeless tobacco: Never   Substance Use Topics    Alcohol use: Yes     Alcohol/week: 2.0 standard drinks of alcohol     Types: 2 Glasses of wine per week     Comment: 1-2 glasses of wine nightly    Drug use: Never     PHYSICAL EXAM  (up to 7 for level 4, 8 or more for level 5)     ED Triage Vitals [03/09/24 1650]   Temperature Heart Rate Respirations BP   36.1 °C (97 °F) 90 18 103/56      Pulse Ox Temp Source Heart Rate Source Patient Position   100 % Temporal Monitor Sitting      BP Location FiO2 (%)     Right arm --       Physical Exam  Vitals and nursing note reviewed.   Constitutional:       General: She is not in acute distress.     Appearance: Normal appearance. She is not ill-appearing.   HENT:      Head: Normocephalic and atraumatic. No contusion or laceration.      Jaw: No trismus or malocclusion.      Right Ear: External ear normal.      Left Ear: External ear normal.      Mouth/Throat:      Mouth: Mucous membranes are moist.      Pharynx: Oropharynx is clear.   Eyes:      Extraocular Movements: Extraocular movements intact.      Pupils: Pupils are equal, round, and reactive to light.   Neck:      Trachea: No tracheal deviation.   Cardiovascular:      Rate and Rhythm: Normal rate and regular rhythm.      Pulses: Normal pulses.   Pulmonary:      Effort: No respiratory distress.      Breath sounds: No wheezing, rhonchi or rales.   Chest:      Chest wall: No tenderness.   Abdominal:       General: Abdomen is flat. There is no distension.      Palpations: Abdomen is soft. There is no mass.      Tenderness: There is no abdominal tenderness. There is no right CVA tenderness or left CVA tenderness.   Musculoskeletal:         General: Signs of injury (Left forearm skin tear x 2) present. No tenderness.      Cervical back: Normal range of motion. No tenderness.      Right lower leg: No edema.      Left lower leg: No edema.   Skin:     Coloration: Skin is not jaundiced or pale.      Findings: No rash or wound.   Neurological:      General: No focal deficit present.      Mental Status: She is alert and oriented to person, place, and time. Mental status is at baseline.   Psychiatric:         Speech: Speech normal.         Behavior: Behavior normal.         Thought Content: Thought content does not include homicidal or suicidal ideation.         Judgment: Judgment normal.        DIAGNOSTIC RESULTS   LABS:  Labs Reviewed   CBC WITH AUTO DIFFERENTIAL - Abnormal       Result Value    WBC 9.7      nRBC 0.0      RBC 2.76 (*)     Hemoglobin 9.2 (*)     Hematocrit 27.6 (*)           MCH 33.3      MCHC 33.3      RDW 12.6      Platelets 186      Neutrophils % 75.4      Immature Granulocytes %, Automated 0.3      Lymphocytes % 17.2      Monocytes % 6.5      Eosinophils % 0.3      Basophils % 0.3      Neutrophils Absolute 7.31      Immature Granulocytes Absolute, Automated 0.03      Lymphocytes Absolute 1.67      Monocytes Absolute 0.63      Eosinophils Absolute 0.03      Basophils Absolute 0.03     COMPREHENSIVE METABOLIC PANEL - Abnormal    Glucose 178 (*)     Sodium 129 (*)     Potassium 3.5      Chloride 100      Bicarbonate 19 (*)     Anion Gap 14      Urea Nitrogen 29 (*)     Creatinine 1.48 (*)     eGFR 39 (*)     Calcium 8.5 (*)     Albumin 3.7      Alkaline Phosphatase 73      Total Protein 5.6 (*)     AST 38      Bilirubin, Total 0.7      ALT 21     MAGNESIUM - Abnormal    Magnesium 1.40 (*)    APTT -  Abnormal    aPTT 25 (*)     Narrative:     The APTT is no longer used for monitoring Unfractionated Heparin Therapy. For monitoring Heparin Therapy, use the Heparin Assay.   B-TYPE NATRIURETIC PEPTIDE - Abnormal     (*)     Narrative:        <100 pg/mL - Heart failure unlikely  100-299 pg/mL - Intermediate probability of acute heart                  failure exacerbation. Correlate with clinical                  context and patient history.    >=300 pg/mL - Heart Failure likely. Correlate with clinical                  context and patient history.    BNP testing is performed using different testing methodology at [facility] than at other [facility]. Direct result comparisons should only be made within the same method.      PROTIME-INR - Normal    Protime 10.3      INR 0.9     SERIAL TROPONIN, 1 HOUR - Normal    Troponin I, High Sensitivity 5      Narrative:     Less than 99th percentile of normal range cutoff-  Female and children under 18 years old <14 ng/L; Male <21 ng/L: Negative  Repeat testing should be performed if clinically indicated.     Female and children under 18 years old 14-50 ng/L; Male 21-50 ng/L:  Consistent with possible cardiac damage and possible increased clinical   risk. Serial measurements may help to assess extent of myocardial damage.     >50 ng/L: Consistent with cardiac damage, increased clinical risk and  myocardial infarction. Serial measurements may help assess extent of   myocardial damage.      NOTE: Children less than 1 year old may have higher baseline troponin   levels and results should be interpreted in conjunction with the overall   clinical context.     NOTE: Troponin I testing is performed using a different   testing methodology at [facility] than at other   [facility]. Direct result comparisons should only   be made within the same method.   TSH WITH REFLEX TO FREE T4 IF ABNORMAL - Normal    Thyroid Stimulating Hormone 2.79       Narrative:     TSH testing is performed using different testing methodology at [facility] than at other [facility]. Direct result comparisons should only be made within the same method.     D-DIMER, VTE EXCLUSION - Normal    D-Dimer, Quantitative VTE Exclusion 410      Narrative:     The VTE Exclusion D-Dimer assay is reported in ng/mL Fibrinogen Equivalent Units (FEU).    Per 's instructions for use, a value of less than 500 ng/mL (FEU) may help to exclude DVT or PE in outpatients when the assay is used with a clinical pretest probability assessment.([facility] must utilize and document eCalc 'Wells Score Deep Vein Thrombosis Risk' for DVT exclusion only. Emergency Department should utilize  Guidelines for Emergency Department Use of the VTE Exclusion D-Dimer and Clinical Pretest probability assessment model for DVT or PE exclusion.)   RETICULOCYTES - Normal    Retic % 0.9      Retic Absolute 0.026      Reticulocyte Hemoglobin 37      Immature Retic fraction 1.7     LACTATE DEHYDROGENASE - Normal         TROPONIN SERIES- (INITIAL, 1 HR)    Narrative:     The following orders were created for panel order Troponin I Series, High Sensitivity (0, 1 HR).  Procedure                               Abnormality         Status                     ---------                               -----------         ------                     Troponin I, High Sensiti...[[id number]]                                                 Troponin, High Sensitivi...[[id number]]  Normal              Final result                 Please view results for these tests on the individual orders.   HAPTOGLOBIN   IRON AND TIBC   HEMOGLOBIN A1C   CBC   BASIC METABOLIC PANEL   MAGNESIUM   PHOSPHORUS     All other labs were within normal range or not returned as of this dictation.  Imaging  CT head wo IV contrast   Final Result   Brain:   No acute intracranial hemorrhage, mass effect, or CT apparent acute   infarct. Chronic  microvascular ischemic and involutional changes.        Cervical spine:   No acute fracture or traumatic malalignment   Degenerative changes, most prominent at C5-C6.             Signed by: Sai Guardado 3/9/2024 7:22 PM   Dictation workstation:   [id number]      CT cervical spine wo IV contrast   Final Result   Brain:   No acute intracranial hemorrhage, mass effect, or CT apparent acute   infarct. Chronic microvascular ischemic and involutional changes.        Cervical spine:   No acute fracture or traumatic malalignment   Degenerative changes, most prominent at C5-C6.             Signed by: Sai Guardado 3/9/2024 7:22 PM   Dictation workstation:   [id number]      XR chest 1 view   Final Result   1.  No evidence of acute cardiopulmonary process.             Signed by: Mason Spencer 3/9/2024 6:37 PM   Dictation workstation:   [id number]         Procedure  Laceration Repair    Performed by: Isaiah Tate DO  Authorized by: Reji Singh DO    Consent:     Consent obtained:  Verbal    Consent given by:  Patient    Risks, benefits, and alternatives were discussed: yes      Risks discussed:  Infection, need for additional repair, poor cosmetic result, pain, retained foreign body, vascular damage, tendon damage, poor wound healing and nerve damage    Alternatives discussed:  No treatment, delayed treatment and observation  Universal protocol:     Procedure explained and questions answered to patient or proxy's satisfaction: yes      Relevant documents present and verified: yes      Test results available: yes      Imaging studies available: yes      Required blood products, implants, devices, and special equipment available: yes      Site/side marked: yes      Immediately prior to procedure, a time out was called: yes      Patient identity confirmed:  Verbally with patient, arm band and provided demographic data  Anesthesia:     Anesthesia method:  None  Laceration details:     Location:  Shoulder/arm     "Shoulder/arm location:  L lower arm    Length (cm):  3  Pre-procedure details:     Preparation:  Patient was prepped and draped in usual sterile fashion  Exploration:     Limited defect created (wound extended): no      Hemostasis achieved with:  Direct pressure    Wound exploration: entire depth of wound visualized      Wound extent: no foreign bodies/material noted, no muscle damage noted, no nerve damage noted, no tendon damage noted, no underlying fracture noted and no vascular damage noted    Treatment:     Area cleansed with:  Saline    Amount of cleaning:  Standard    Irrigation solution:  Sterile saline    Irrigation volume:  500cc    Irrigation method:  Syringe    Debridement:  None    Undermining:  None    Scar revision: no    Skin repair:     Repair method:  Steri-Strips    Number of Steri-Strips:  5  Approximation:     Approximation:  Loose  Repair type:     Repair type:  Simple  Post-procedure details:     Dressing:  Open (no dressing)    Procedure completion:  Tolerated well, no immediate complications    EMERGENCY DEPARTMENT COURSE/MDM:   Medical Decision Making    Vitals:    Vitals:    03/09/24 1930 03/09/24 2000 03/09/24 2030 03/09/24 2100   BP: 112/59 115/71 113/73    BP Location:   Right arm    Patient Position:   Lying    Pulse: 82 88 94    Resp: 18 18 16    Temp:   35.7 °C (96.3 °F)    TempSrc:   Temporal    SpO2: 100% 100% 100%    Weight:   (!) 41 kg (90 lb 6.2 oz) (!) 41 kg (90 lb 6.2 oz)   Height:   1.575 m (5' 2\")      Sang Joy is a female 68 y.o. who presents to the ER for syncope. On arrival the patients vital signs were: Afebrile, Reguar HR, Normotensive, Regular RR, and Oxygenating well on room air. History obtained from: patient and Spouse.  Cardiac workup initiated along with CT head and C-spine given fall with head strike.  Repeat blood pressure was low, 1 L NS provided for rehydration.    EKG 1650 Rhythm: Sinus Ventricular rate: 94 Intervals (-200 /QRS  /QT >450M " or >470F):  QRS 0 Qtc 415 Blocks: None Potential signs of ischemia: Lateral T wave inversions similar to January 29, 2024    My independent interpretation of Imaging: CT head and C-spine show no acute traumatic abnormalities.    ED Course as of 03/10/24 0003   Sat Mar 09, 2024   1900 Troponin I Series, High Sensitivity (0, 1 HR)  No significant elevation of troponins [CB]   1912 CBC and Auto Differential(!)  Normocytic normochromic anemia downtrending over the last 2 months, no white blood cell or platelet derangement. [CB]   1912 Reticulocytes  Hypoproliferative [CB]   1918 Protime-INR  No coagulopathy [CB]   1918 D-dimer, VTE Exclusion  D-dimer not elevated, PE unlikely [CB]   1918 TSH with reflex to Free T4 if abnormal  No thyroid dysfunction [CB]   1919 Comprehensive Metabolic Panel(!)  Hyponatremia, CHULA, hypomagnesemia, no acute hepatic abnormality [CB]   1921 LDH, Lactate dehydrogenase  Hemolysis unlikely [CB]   1922 XR chest 1 view  No acute cardiopulmonary process [CB]      ED Course User Index  [CB] Isaiah Tate, DO         Diagnoses as of 03/10/24 0003   Syncope and collapse   Symptomatic anemia   CHULA (acute kidney injury) (CMS/HCC)   Hyponatremia   Hypomagnesemia       External Records Reviewed: I reviewed recent and relevant outside records including inpatient notes, outpatient records    Shared decision making for disposition  Patient and/or patient´s representative was counseled regarding labs, imaging, likely diagnosis. All questions were answered. Recommendation was made   for Admission given the need for further escalation of care to inpatient management. Patient agreed and was admitted in stable condition. Admitting team was notified of any pending labs or imaging to ensure continuity of care.     ED Medications administered this visit:    Medications   lactated Ringer's infusion (50 mL/hr intravenous New Bag 3/9/24 2040)   melatonin tablet 3 mg (has no administration in time range)    polyethylene glycol (Glycolax, Miralax) packet 17 g (has no administration in time range)   sodium chloride 0.9% flush 10 mL (10 mL intravenous Given 3/9/24 2103)   ondansetron ODT (Zofran-ODT) disintegrating tablet 4 mg (has no administration in time range)     Or   ondansetron (Zofran) injection 4 mg (has no administration in time range)   lactobacillus acidophilus capsule 1 capsule (1 capsule oral Given 3/9/24 2042)   sodium chloride 0.9 % bolus 1,000 mL (0 mL intravenous Stopped 3/9/24 2008)   magnesium sulfate IV 2 g (0 g intravenous Stopped 3/9/24 2237)   potassium chloride CR (Klor-Con M20) ER tablet 20 mEq (20 mEq oral Given 3/9/24 2042)          Final Impression:   1. Syncope and collapse    2. Symptomatic anemia    3. CHULA (acute kidney injury) (CMS/HCC)    4. Hyponatremia    5. Hypomagnesemia          Please excuse any misspellings or unintended errors related to the Dragon speech recognition software used to dictate this note.    I reviewed the case with the attending ED physician. The attending ED physician agrees with the plan.      Isaiah Tate DO  Resident  03/10/24 0003

## 2024-03-10 ENCOUNTER — APPOINTMENT (OUTPATIENT)
Dept: RADIOLOGY | Facility: HOSPITAL | Age: 69
DRG: 683 | End: 2024-03-10
Payer: MEDICARE

## 2024-03-10 PROBLEM — G96.08 SUBDURAL HYGROMA: Status: RESOLVED | Noted: 2024-03-05 | Resolved: 2024-03-10

## 2024-03-10 PROBLEM — E87.8 ELECTROLYTE DEPLETION: Status: ACTIVE | Noted: 2024-03-10

## 2024-03-10 PROBLEM — G89.29 CHRONIC LOW BACK PAIN: Status: RESOLVED | Noted: 2024-03-05 | Resolved: 2024-03-10

## 2024-03-10 PROBLEM — I10 PRIMARY HYPERTENSION: Status: RESOLVED | Noted: 2024-02-26 | Resolved: 2024-03-10

## 2024-03-10 PROBLEM — D64.9 ACUTE ON CHRONIC ANEMIA: Status: ACTIVE | Noted: 2024-03-10

## 2024-03-10 PROBLEM — M06.9 RHEUMATOID ARTHRITIS INVOLVING MULTIPLE SITES (MULTI): Status: RESOLVED | Noted: 2024-03-05 | Resolved: 2024-03-10

## 2024-03-10 PROBLEM — F17.200 SMOKER UNMOTIVATED TO QUIT: Status: RESOLVED | Noted: 2024-03-07 | Resolved: 2024-03-10

## 2024-03-10 PROBLEM — Z91.81 RISK FOR FALLS: Status: RESOLVED | Noted: 2024-03-07 | Resolved: 2024-03-10

## 2024-03-10 PROBLEM — S51.819A SKIN TEAR OF FOREARM WITHOUT COMPLICATION, INITIAL ENCOUNTER: Status: ACTIVE | Noted: 2024-03-10

## 2024-03-10 PROBLEM — M54.50 CHRONIC LOW BACK PAIN: Status: RESOLVED | Noted: 2024-03-05 | Resolved: 2024-03-10

## 2024-03-10 PROBLEM — E43 SEVERE PROTEIN-CALORIE MALNUTRITION (MULTI): Status: RESOLVED | Noted: 2024-03-07 | Resolved: 2024-03-10

## 2024-03-10 PROBLEM — N17.9 ACUTE RENAL FAILURE (ARF) (CMS-HCC): Status: ACTIVE | Noted: 2024-03-10

## 2024-03-10 LAB
25(OH)D3 SERPL-MCNC: 49 NG/ML (ref 30–100)
ANION GAP SERPL CALC-SCNC: 12 MMOL/L (ref 10–20)
BUN SERPL-MCNC: 19 MG/DL (ref 6–23)
CALCIUM SERPL-MCNC: 8.7 MG/DL (ref 8.6–10.3)
CHLORIDE SERPL-SCNC: 104 MMOL/L (ref 98–107)
CHOLEST SERPL-MCNC: 168 MG/DL (ref 0–199)
CHOLESTEROL/HDL RATIO: 2.7
CO2 SERPL-SCNC: 23 MMOL/L (ref 21–32)
CREAT SERPL-MCNC: 0.94 MG/DL (ref 0.5–1.05)
EGFRCR SERPLBLD CKD-EPI 2021: 67 ML/MIN/1.73M*2
ERYTHROCYTE [DISTWIDTH] IN BLOOD BY AUTOMATED COUNT: 12.5 % (ref 11.5–14.5)
EST. AVERAGE GLUCOSE BLD GHB EST-MCNC: 120 MG/DL
GLUCOSE SERPL-MCNC: 109 MG/DL (ref 74–99)
HBA1C MFR BLD: 5.8 %
HCT VFR BLD AUTO: 30.1 % (ref 36–46)
HDLC SERPL-MCNC: 63.2 MG/DL
HGB BLD-MCNC: 10.2 G/DL (ref 12–16)
IRON SATN MFR SERPL: 24 % (ref 25–45)
IRON SERPL-MCNC: 71 UG/DL (ref 35–150)
LDLC SERPL CALC-MCNC: 59 MG/DL
MAGNESIUM SERPL-MCNC: 2.3 MG/DL (ref 1.6–2.4)
MCH RBC QN AUTO: 32.6 PG (ref 26–34)
MCHC RBC AUTO-ENTMCNC: 33.9 G/DL (ref 32–36)
MCV RBC AUTO: 96 FL (ref 80–100)
NON HDL CHOLESTEROL: 105 MG/DL (ref 0–149)
NRBC BLD-RTO: 0 /100 WBCS (ref 0–0)
PHOSPHATE SERPL-MCNC: 2.3 MG/DL (ref 2.5–4.9)
PLATELET # BLD AUTO: 208 X10*3/UL (ref 150–450)
POTASSIUM SERPL-SCNC: 3.7 MMOL/L (ref 3.5–5.3)
PREALB SERPL-MCNC: 34.4 MG/DL (ref 18–40)
RBC # BLD AUTO: 3.13 X10*6/UL (ref 4–5.2)
SODIUM SERPL-SCNC: 135 MMOL/L (ref 136–145)
TIBC SERPL-MCNC: 290 UG/DL (ref 240–445)
TRIGL SERPL-MCNC: 229 MG/DL (ref 0–149)
UIBC SERPL-MCNC: 219 UG/DL (ref 110–370)
VIT B12 SERPL-MCNC: 268 PG/ML (ref 211–911)
VLDL: 46 MG/DL (ref 0–40)
WBC # BLD AUTO: 8.6 X10*3/UL (ref 4.4–11.3)

## 2024-03-10 PROCEDURE — 2500000005 HC RX 250 GENERAL PHARMACY W/O HCPCS: Performed by: NURSE PRACTITIONER

## 2024-03-10 PROCEDURE — 84134 ASSAY OF PREALBUMIN: CPT | Mod: STJLAB | Performed by: NURSE PRACTITIONER

## 2024-03-10 PROCEDURE — 80061 LIPID PANEL: CPT | Performed by: NURSE PRACTITIONER

## 2024-03-10 PROCEDURE — 82607 VITAMIN B-12: CPT | Mod: STJLAB | Performed by: NURSE PRACTITIONER

## 2024-03-10 PROCEDURE — 80048 BASIC METABOLIC PNL TOTAL CA: CPT | Performed by: NURSE PRACTITIONER

## 2024-03-10 PROCEDURE — 82306 VITAMIN D 25 HYDROXY: CPT | Mod: STJLAB | Performed by: NURSE PRACTITIONER

## 2024-03-10 PROCEDURE — 2500000001 HC RX 250 WO HCPCS SELF ADMINISTERED DRUGS (ALT 637 FOR MEDICARE OP): Performed by: NURSE PRACTITIONER

## 2024-03-10 PROCEDURE — 2500000001 HC RX 250 WO HCPCS SELF ADMINISTERED DRUGS (ALT 637 FOR MEDICARE OP): Performed by: PHYSICIAN ASSISTANT

## 2024-03-10 PROCEDURE — 2500000004 HC RX 250 GENERAL PHARMACY W/ HCPCS (ALT 636 FOR OP/ED): Performed by: NURSE PRACTITIONER

## 2024-03-10 PROCEDURE — 85027 COMPLETE CBC AUTOMATED: CPT | Performed by: NURSE PRACTITIONER

## 2024-03-10 PROCEDURE — 36415 COLL VENOUS BLD VENIPUNCTURE: CPT | Performed by: NURSE PRACTITIONER

## 2024-03-10 PROCEDURE — 83540 ASSAY OF IRON: CPT | Performed by: NURSE PRACTITIONER

## 2024-03-10 PROCEDURE — 70551 MRI BRAIN STEM W/O DYE: CPT | Performed by: STUDENT IN AN ORGANIZED HEALTH CARE EDUCATION/TRAINING PROGRAM

## 2024-03-10 PROCEDURE — 70544 MR ANGIOGRAPHY HEAD W/O DYE: CPT

## 2024-03-10 PROCEDURE — 1200000002 HC GENERAL ROOM WITH TELEMETRY DAILY

## 2024-03-10 PROCEDURE — 70544 MR ANGIOGRAPHY HEAD W/O DYE: CPT | Performed by: STUDENT IN AN ORGANIZED HEALTH CARE EDUCATION/TRAINING PROGRAM

## 2024-03-10 PROCEDURE — 83735 ASSAY OF MAGNESIUM: CPT | Performed by: NURSE PRACTITIONER

## 2024-03-10 PROCEDURE — 70551 MRI BRAIN STEM W/O DYE: CPT

## 2024-03-10 PROCEDURE — 84100 ASSAY OF PHOSPHORUS: CPT | Performed by: NURSE PRACTITIONER

## 2024-03-10 RX ORDER — LANOLIN ALCOHOL/MO/W.PET/CERES
400 CREAM (GRAM) TOPICAL DAILY
Status: DISCONTINUED | OUTPATIENT
Start: 2024-03-10 | End: 2024-03-11 | Stop reason: HOSPADM

## 2024-03-10 RX ORDER — ALBUTEROL SULFATE 0.83 MG/ML
2.5 SOLUTION RESPIRATORY (INHALATION) EVERY 4 HOURS PRN
Status: DISCONTINUED | OUTPATIENT
Start: 2024-03-10 | End: 2024-03-11 | Stop reason: HOSPADM

## 2024-03-10 RX ORDER — METOPROLOL SUCCINATE 25 MG/1
25 TABLET, EXTENDED RELEASE ORAL
Status: DISCONTINUED | OUTPATIENT
Start: 2024-03-10 | End: 2024-03-11 | Stop reason: HOSPADM

## 2024-03-10 RX ORDER — ALBUTEROL SULFATE 90 UG/1
2 AEROSOL, METERED RESPIRATORY (INHALATION) EVERY 4 HOURS PRN
Status: DISCONTINUED | OUTPATIENT
Start: 2024-03-10 | End: 2024-03-10

## 2024-03-10 RX ORDER — POTASSIUM PHOSPHATE, MONOBASIC AND POTASSIUM PHOSPHATE, DIBASIC 224; 236 MG/ML; MG/ML
15 INJECTION, SOLUTION INTRAVENOUS ONCE
Status: COMPLETED | OUTPATIENT
Start: 2024-03-10 | End: 2024-03-10

## 2024-03-10 RX ORDER — LOSARTAN POTASSIUM 100 MG/1
100 TABLET ORAL DAILY
Status: DISCONTINUED | OUTPATIENT
Start: 2024-03-10 | End: 2024-03-11 | Stop reason: HOSPADM

## 2024-03-10 RX ORDER — PANTOPRAZOLE SODIUM 40 MG/1
40 TABLET, DELAYED RELEASE ORAL
Status: DISCONTINUED | OUTPATIENT
Start: 2024-03-11 | End: 2024-03-11 | Stop reason: HOSPADM

## 2024-03-10 RX ORDER — MECLIZINE HCL 12.5 MG 12.5 MG/1
12.5 TABLET ORAL 3 TIMES DAILY PRN
Status: DISCONTINUED | OUTPATIENT
Start: 2024-03-10 | End: 2024-03-11 | Stop reason: HOSPADM

## 2024-03-10 RX ORDER — HYDROXYCHLOROQUINE SULFATE 200 MG/1
200 TABLET, FILM COATED ORAL DAILY
Status: DISCONTINUED | OUTPATIENT
Start: 2024-03-10 | End: 2024-03-11 | Stop reason: HOSPADM

## 2024-03-10 RX ORDER — CHOLECALCIFEROL (VITAMIN D3) 25 MCG
2000 TABLET ORAL DAILY
Status: DISCONTINUED | OUTPATIENT
Start: 2024-03-10 | End: 2024-03-11 | Stop reason: HOSPADM

## 2024-03-10 RX ORDER — FUROSEMIDE 40 MG/1
40 TABLET ORAL DAILY PRN
Status: DISCONTINUED | OUTPATIENT
Start: 2024-03-10 | End: 2024-03-11 | Stop reason: HOSPADM

## 2024-03-10 RX ORDER — LORATADINE 10 MG/1
10 TABLET ORAL DAILY
Status: DISCONTINUED | OUTPATIENT
Start: 2024-03-10 | End: 2024-03-11 | Stop reason: HOSPADM

## 2024-03-10 RX ORDER — AMLODIPINE BESYLATE 5 MG/1
5 TABLET ORAL DAILY
Status: DISCONTINUED | OUTPATIENT
Start: 2024-03-10 | End: 2024-03-11 | Stop reason: HOSPADM

## 2024-03-10 RX ORDER — LORAZEPAM 0.5 MG/1
0.5 TABLET ORAL ONCE
Status: COMPLETED | OUTPATIENT
Start: 2024-03-10 | End: 2024-03-10

## 2024-03-10 RX ORDER — FLUTICASONE PROPIONATE 50 MCG
1 SPRAY, SUSPENSION (ML) NASAL DAILY
Status: DISCONTINUED | OUTPATIENT
Start: 2024-03-10 | End: 2024-03-11 | Stop reason: HOSPADM

## 2024-03-10 RX ADMIN — Medication 10 ML: at 13:27

## 2024-03-10 RX ADMIN — LORAZEPAM 0.5 MG: 0.5 TABLET ORAL at 16:12

## 2024-03-10 RX ADMIN — Medication 1 CAPSULE: at 20:40

## 2024-03-10 RX ADMIN — Medication 10 ML: at 21:00

## 2024-03-10 RX ADMIN — POTASSIUM PHOSPHATE, MONOBASIC AND POTASSIUM PHOSPHATE, DIBASIC 15 MMOL: 224; 236 INJECTION, SOLUTION, CONCENTRATE INTRAVENOUS at 10:21

## 2024-03-10 ASSESSMENT — COGNITIVE AND FUNCTIONAL STATUS - GENERAL
DAILY ACTIVITIY SCORE: 24
DAILY ACTIVITIY SCORE: 24
MOBILITY SCORE: 24
MOBILITY SCORE: 24

## 2024-03-10 ASSESSMENT — PAIN SCALES - GENERAL
PAINLEVEL_OUTOF10: 0 - NO PAIN
PAINLEVEL_OUTOF10: 0 - NO PAIN

## 2024-03-10 ASSESSMENT — ENCOUNTER SYMPTOMS
BLOOD IN STOOL: 0
NECK PAIN: 0
FEVER: 0
UNEXPECTED WEIGHT CHANGE: 1
VOMITING: 0
FLANK PAIN: 0
WEAKNESS: 1
NECK STIFFNESS: 0
APPETITE CHANGE: 1
TROUBLE SWALLOWING: 0
DYSURIA: 0
DIARRHEA: 1
CONFUSION: 0
CHEST TIGHTNESS: 0
WOUND: 1
HEADACHES: 0
SHORTNESS OF BREATH: 1
PALPITATIONS: 0
CHILLS: 0
HEMATURIA: 0
DIZZINESS: 0
COUGH: 0
NAUSEA: 0
ABDOMINAL PAIN: 0

## 2024-03-10 NOTE — H&P
Internal Medicine History and Physical    PATIENT NAME:  Sang Joy    MRN: [id number]  DATE of SERVICE: March 10, 2024    Primary Care Physician: KERRY Prabhakar-CNP          Chief Complaint:  Syncope and frequent falls    HPI:  This is a 68 y.o. female with past medical history of asthma, hypertension, vertebrobasilar artery syndrome who presents with syncopal episode, patient states she was at home preparing food in the kitchen, she turned around she felt lightheaded and fell down, her  tried to help her up to get to the living room and then the living room she fell down again, today patient feels better but she still did not try to get off bed.      Past Medical History:  Past Medical History:   Diagnosis Date    Acquired cataract     Anxiety     Asthma     Carpal tunnel syndrome     Cervical spine arthritis     Chronic anemia     Chronic malnutrition (CMS/HCC)     Episode of dizziness     Frequent falls     History of rib fracture     History of syncope     History of uterine leiomyoma     Hypertension     Migraines     Osteoporosis     Rheumatoid arthritis (CMS/HCC)     Spinal stenosis of lumbosacral region with radiculopathy     Subdural hygroma     Vertebrobasilar artery syndrome        Past Surgical History:  Past Surgical History:   Procedure Laterality Date    APPENDECTOMY      DENTAL SURGERY      DILATION AND CURETTAGE OF UTERUS         Family History:  Family History   Problem Relation Name Age of Onset    Atrial fibrillation Mother Medina Desmond     Hypertension Mother Medina Desmond     Kidney disease Mother Medina Desmond     Arthritis Father Cruz A Desmond     Atrial fibrillation Father Cruz A Desmond     Cancer Father Cruz A Desmond     Heart disease Father Cruz A Desmond     Hernia Father Cruz A Desmond     Hypertension Father Cruz A Desmond     Hypertension Maternal Grandmother Taina Randall     Stroke Maternal Grandmother Taina Randall     Heart disease Paternal  Grandfather Gioavnni Logan     Hypertension Paternal Grandfather Giovanni Logan     Breast cancer Paternal Grandmother Essie Logan     Cancer Paternal Grandmother Essie Logan     Hypertension Paternal Grandmother Essie Logan     Atrial fibrillation Brother Barry Logan     Hypertension Brother Barry Logan     Hypertension Sister Ana María Denney        Social History:    Social History     Socioeconomic History    Marital status:      Spouse name: Not on file    Number of children: Not on file    Years of education: Not on file    Highest education level: Not on file   Occupational History    Not on file   Tobacco Use    Smoking status: Every Day     Packs/day: 0.50     Years: 15.00     Additional pack years: 0.00     Total pack years: 7.50     Types: Cigarettes    Smokeless tobacco: Never   Substance and Sexual Activity    Alcohol use: Yes     Alcohol/week: 2.0 standard drinks of alcohol     Types: 2 Glasses of wine per week     Comment: 1-2 glasses of wine nightly    Drug use: Never    Sexual activity: Not Currently     Birth control/protection: None   Other Topics Concern    Not on file   Social History Narrative    Not on file     Social Determinants of Health     Financial Resource Strain: Low Risk  (3/9/2024)    Overall Financial Resource Strain (CARDIA)     Difficulty of Paying Living Expenses: Not hard at all   Food Insecurity: Not on file   Transportation Needs: No Transportation Needs (3/9/2024)    PRAPARE - Transportation     Lack of Transportation (Medical): No     Lack of Transportation (Non-Medical): No   Physical Activity: Not on file   Stress: Not on file   Social Connections: Not on file   Intimate Partner Violence: Not on file   Housing Stability: Low Risk  (3/9/2024)    Housing Stability Vital Sign     Unable to Pay for Housing in the Last Year: No     Number of Places Lived in the Last Year: 1     Unstable Housing in the Last Year: No         Prior to Admission Medications:  Medications  Prior to Admission   Medication Sig Dispense Refill Last Dose    ALBUTEROL SULFATE INHL Inhale 2 puffs every 6 hours if needed.       amLODIPine (Norvasc) 5 mg tablet Take 0.5 tablets (2.5 mg) by mouth once daily. (Patient taking differently: Take 1 tablet (5 mg) by mouth once daily.) 15 tablet 0     calcium-vitamin D3-vitamin K (Viactiv) 650 mg-12.5 mcg-40 mcg chewable tablet Chew 2 tablets once daily.       cholecalciferol (Vitamin D3) 50 MCG (2000 UT) tablet Take 1 tablet (50 mcg) by mouth once daily.       fluticasone (Flonase Allergy Relief) 50 mcg/actuation nasal spray 1 sprays, Nasal, DAILY, in each nostril, Refill(s) 0       furosemide (Lasix) 40 mg tablet Take 1 tablet (40 mg) by mouth once daily as needed.       hydroxychloroquine (Plaquenil) 200 mg tablet Take 1 tablet (200 mg) by mouth once daily.       loratadine 10 mg tablet,chewable Chew once every 24 hours.       losartan (Cozaar) 100 mg tablet Take 1 tablet (100 mg) by mouth once daily.       magnesium oxide (Mag-Ox) 400 mg tablet Take 1 tablet (400 mg) by mouth once daily.       meclizine (Antivert) 12.5 mg tablet Take 1 tablet (12.5 mg) by mouth 3 times a day as needed for dizziness.       metoprolol succinate XL (Toprol-XL) 25 mg 24 hr tablet Take 1 tablet (25 mg) by mouth once daily.       multivitamin capsule 1 capsule once daily.       omega-3 (Fish Oil) 60- mg capsule Take 1 capsule (500 mg) by mouth once daily. David red       omeprazole OTC (PriLOSEC OTC) 20 mg EC tablet 1 tablet (20 mg).       POTASSIUM GLUCONATE ORAL Take 90 mg by mouth once daily.          Active orders:  Active Orders   Imaging    MR angio head wo IV contrast     Frequency: Once     Number of Occurrences: 1 Occurrences    MR brain wo IV contrast     Frequency: Once     Number of Occurrences: 1 Occurrences   Lab    Basic metabolic panel     Frequency: AM draw     Number of Occurrences: Until Specified    CBC     Frequency: AM draw     Number of Occurrences: Until  Specified    Magnesium     Frequency: AM draw     Number of Occurrences: Until Specified   Diet    Adult diet Regular     Frequency: Effective now     Number of Occurrences: Until Specified   Nursing    Activity (specify) Out of Bed with Assistance     Frequency: Until discontinued     Number of Occurrences: Until Specified    Orthostatic blood pressure     Frequency: Daily     Number of Occurrences: Until Specified    STEFNAIE hose     Frequency: Until discontinued     Number of Occurrences: Until Specified    Telemetry monitoring     Frequency: Until discontinued     Number of Occurrences: 3 Days    Vital Signs     Frequency: q8h     Number of Occurrences: Until Specified   Code Status    DNR Comfort Measures Only     Frequency: Continuous     Number of Occurrences: Until Specified   Consult    Inpatient consult to Dietitian     Frequency: Once     Number of Occurrences: 1 Occurrences     Order Comments: Underweight, unintentional weight loss     Nourishments    May Participate in Room Service     Frequency: Once     Number of Occurrences: 1 Occurrences   OT    OT eval and treat     Frequency: Until therapy completed     Number of Occurrences: 1 Occurrences     Order Comments: Recurrent falls at home     PT    PT eval and treat     Frequency: Until therapy completed     Number of Occurrences: 1 Occurrences     Order Comments: Recurrent falls at home     ECG    Electrocardiogram, 12-lead PRN ACS symptoms     Frequency: PRN     Number of Occurrences: Until Specified     Order Comments: Notify provider if performed.     Echocardiography    Transthoracic Echo (TTE) Complete     Frequency: Once     Number of Occurrences: 1 Occurrences   Medications    lactated Ringer's infusion     Frequency: Continuous     Dose: 50 mL/hr     Route: intravenous    lactobacillus acidophilus capsule 1 capsule     Frequency: BID     Dose: 1 capsule     Route: oral    melatonin tablet 3 mg     Frequency: Nightly PRN     Dose: 3 mg     Route:  "oral    ondansetron (Zofran) injection 4 mg     Linked Order: Or     Frequency: q8h PRN     Dose: 4 mg     Route: intravenous    ondansetron ODT (Zofran-ODT) disintegrating tablet 4 mg     Linked Order: Or     Frequency: q8h PRN     Dose: 4 mg     Route: oral    polyethylene glycol (Glycolax, Miralax) packet 17 g     Frequency: Daily PRN     Dose: 17 g     Route: oral    potassium phosphates in dextrose 5% 250 mL IV 15 mmol     Frequency: Once     Dose: 15 mmol     Route: intravenous    sodium chloride 0.9% flush 10 mL     Frequency: q8h LEANN     Dose: 10 mL     Route: intravenous           Allergies:   Allergies   Allergen Reactions    Clarithromycin Shortness of breath    Influenza Virus Vaccines Anaphylaxis    Iodine Hives    Penicillins Anaphylaxis    Acetaminophen Other     Cannot take while on Plaquenil    Codeine Nausea/vomiting    Tetanus Toxoid, Adsorbed Other    Amoxicillin Rash       Review of Systems:  A 10 systems review of systems is negative except for HPI.      Vitals:  Patient Vitals for the past 24 hrs:   BP Temp Temp src Pulse Resp SpO2 Height Weight   03/10/24 0800 132/71 36.1 °C (97 °F) Temporal 85 16 100 % -- --   03/09/24 2100 -- -- -- -- -- -- -- (!) 41 kg (90 lb 6.2 oz)   03/09/24 2030 113/73 35.7 °C (96.3 °F) Temporal 94 16 100 % 1.575 m (5' 2\") (!) 41 kg (90 lb 6.2 oz)   03/09/24 2000 115/71 -- -- 88 18 100 % -- --   03/09/24 1930 112/59 -- -- 82 18 100 % -- --   03/09/24 1900 116/63 -- -- 90 18 100 % -- --   03/09/24 1800 99/53 -- -- 84 18 100 % -- --   03/09/24 1720 97/53 -- -- 88 18 100 % -- --   03/09/24 1700 -- -- -- 88 18 -- -- --   03/09/24 1650 103/56 36.1 °C (97 °F) Temporal 90 18 100 % 1.575 m (5' 2\") (!) 39.9 kg (87 lb 15.4 oz)     Body mass index is 16.53 kg/m².         Physical Exam:  General appearance: Well-appearing alert, in no acute distress and well-hydrated, well nourished  Skin: Skin color, texture, turgor normal, no suspicious rashes or lesions  Head: normal  Eyes: " Anicteric sclera. Pupils are equally round and reactive to light.  Extraocular movements are intact.   Ears: external ears normal, canals clear, TM's normal  Nose/Sinuses: Negative  Oropharynx: Lips, mucosa, and tongue normal, teeth and gums normal, oropharynx normal  Neck: Supple, no adenopathy; thyroid symmetric, normal size, no bruits  Lungs: Clear to auscultation, no wheezing or rhonchi  Heart: RRR without murmur, gallop, or rubs.  No ectopy  Abdomen: Soft, non-tender. Bowel sounds normal. No masses, organomegaly  Extremities: No deformity, no edema  Peripheral pulses: Normal  Neuro: Alert oriented x3, no focal deficit.      Labs:  Results for orders placed or performed during the hospital encounter of 03/09/24 (from the past 24 hour(s))   CBC and Auto Differential   Result Value Ref Range    WBC 9.7 4.4 - 11.3 x10*3/uL    nRBC 0.0 0.0 - 0.0 /100 WBCs    RBC 2.76 (L) 4.00 - 5.20 x10*6/uL    Hemoglobin 9.2 (L) 12.0 - 16.0 g/dL    Hematocrit 27.6 (L) 36.0 - 46.0 %     80 - 100 fL    MCH 33.3 26.0 - 34.0 pg    MCHC 33.3 32.0 - 36.0 g/dL    RDW 12.6 11.5 - 14.5 %    Platelets 186 150 - 450 x10*3/uL    Neutrophils % 75.4 40.0 - 80.0 %    Immature Granulocytes %, Automated 0.3 0.0 - 0.9 %    Lymphocytes % 17.2 13.0 - 44.0 %    Monocytes % 6.5 2.0 - 10.0 %    Eosinophils % 0.3 0.0 - 6.0 %    Basophils % 0.3 0.0 - 2.0 %    Neutrophils Absolute 7.31 1.20 - 7.70 x10*3/uL    Immature Granulocytes Absolute, Automated 0.03 0.00 - 0.70 x10*3/uL    Lymphocytes Absolute 1.67 1.20 - 4.80 x10*3/uL    Monocytes Absolute 0.63 0.10 - 1.00 x10*3/uL    Eosinophils Absolute 0.03 0.00 - 0.70 x10*3/uL    Basophils Absolute 0.03 0.00 - 0.10 x10*3/uL   Comprehensive Metabolic Panel   Result Value Ref Range    Glucose 178 (H) 74 - 99 mg/dL    Sodium 129 (L) 136 - 145 mmol/L    Potassium 3.5 3.5 - 5.3 mmol/L    Chloride 100 98 - 107 mmol/L    Bicarbonate 19 (L) 21 - 32 mmol/L    Anion Gap 14 mmol/L    Urea Nitrogen 29 (H) 6 - 23 mg/dL     Creatinine 1.48 (H) 0.50 - 1.05 mg/dL    eGFR 39 (L) >60 mL/min/1.73m*2    Calcium 8.5 (L) 8.6 - 10.3 mg/dL    Albumin 3.7 3.4 - 5.0 g/dL    Alkaline Phosphatase 73 33 - 136 U/L    Total Protein 5.6 (L) 6.4 - 8.2 g/dL    AST 38 9 - 39 U/L    Bilirubin, Total 0.7 0.0 - 1.2 mg/dL    ALT 21 7 - 45 U/L   Magnesium   Result Value Ref Range    Magnesium 1.40 (L) 1.60 - 2.40 mg/dL   aPTT   Result Value Ref Range    aPTT 25 (L) 27 - 38 seconds   Protime-INR   Result Value Ref Range    Protime 10.3 9.8 - 12.8 seconds    INR 0.9 0.9 - 1.1   B-Type Natriuretic Peptide   Result Value Ref Range     (H) 0 - 99 pg/mL   Troponin, High Sensitivity, 1 Hour   Result Value Ref Range    Troponin I, High Sensitivity 5 0 - 13 ng/L   TSH with reflex to Free T4 if abnormal   Result Value Ref Range    Thyroid Stimulating Hormone 2.79 0.44 - 3.98 mIU/L   D-dimer, VTE Exclusion   Result Value Ref Range    D-Dimer, Quantitative VTE Exclusion 410 <=500 ng/mL FEU   Reticulocytes   Result Value Ref Range    Retic % 0.9 0.5 - 2.0 %    Retic Absolute 0.026 0.017 - 0.110 x10*6/uL    Reticulocyte Hemoglobin 37 28 - 38 pg    Immature Retic fraction 1.7 <=16.0 %   LDH, Lactate dehydrogenase   Result Value Ref Range     84 - 246 U/L   Hemoglobin A1c   Result Value Ref Range    Hemoglobin A1C 5.8 (H) see below %    Estimated Average Glucose 120 Not Established mg/dL   CBC   Result Value Ref Range    WBC 8.6 4.4 - 11.3 x10*3/uL    nRBC 0.0 0.0 - 0.0 /100 WBCs    RBC 3.13 (L) 4.00 - 5.20 x10*6/uL    Hemoglobin 10.2 (L) 12.0 - 16.0 g/dL    Hematocrit 30.1 (L) 36.0 - 46.0 %    MCV 96 80 - 100 fL    MCH 32.6 26.0 - 34.0 pg    MCHC 33.9 32.0 - 36.0 g/dL    RDW 12.5 11.5 - 14.5 %    Platelets 208 150 - 450 x10*3/uL   Basic metabolic panel   Result Value Ref Range    Glucose 109 (H) 74 - 99 mg/dL    Sodium 135 (L) 136 - 145 mmol/L    Potassium 3.7 3.5 - 5.3 mmol/L    Chloride 104 98 - 107 mmol/L    Bicarbonate 23 21 - 32 mmol/L    Anion Gap 12 10  "- 20 mmol/L    Urea Nitrogen 19 6 - 23 mg/dL    Creatinine 0.94 0.50 - 1.05 mg/dL    eGFR 67 >60 mL/min/1.73m*2    Calcium 8.7 8.6 - 10.3 mg/dL   Iron and TIBC   Result Value Ref Range    Iron 71 35 - 150 ug/dL    UIBC 219 110 - 370 ug/dL    TIBC 290 240 - 445 ug/dL    % Saturation 24 (L) 25 - 45 %   Magnesium   Result Value Ref Range    Magnesium 2.30 1.60 - 2.40 mg/dL   Phosphorus   Result Value Ref Range    Phosphorus 2.3 (L) 2.5 - 4.9 mg/dL   Prealbumin   Result Value Ref Range    Prealbumin 34.4 18.0 - 40.0 mg/dL   Lipid panel   Result Value Ref Range    Cholesterol 168 0 - 199 mg/dL    HDL-Cholesterol 63.2 mg/dL    Cholesterol/HDL Ratio 2.7     LDL Calculated 59 <=99 mg/dL    VLDL 46 (H) 0 - 40 mg/dL    Triglycerides 229 (H) 0 - 149 mg/dL    Non HDL Cholesterol 105 0 - 149 mg/dL   Vitamin B12   Result Value Ref Range    Vitamin B12 268 211 - 911 pg/mL   Vitamin D 25-Hydroxy,Total (for eval of Vitamin D levels)   Result Value Ref Range    Vitamin D, 25-Hydroxy, Total 49 30 - 100 ng/mL         Imaging:   Reviewed          Assessment/Plan:  Principal Problem:    Syncope and collapse  Admit patient to the medical floor telemetry  We will check orthostatic vital signs  Will obtain brain MRI/MRA without contrast  Will obtain 2D echo      Acute renal failure (ARF) (CMS/HCC)  Kidney function normalized  Will monitor       Hypokalemia    Hypomagnesemia  Patient received potassium and magnesium  Continue monitoring      Acute on chronic anemia  Monitor blood count        DVT Prophylaxis- Addressed    Discussed with patient, RN    SIGNATURE: Payam Albert MD  DATE: March 10, 2024  TIME: 1:49 PM      This note was partially created using voice recognition software and is inherently subject to errors including those of syntax and \"sound-alike\" substitutions which may escape proofreading. In such instances, original meaning may be extrapolated by contextual derivation    "

## 2024-03-10 NOTE — NURSING NOTE
Patient remained with no issues this shift. MRI completed. Patient has been up walking around with no assistance. Patient refusing bed alarm at this time. Patient stated she was having diarrhea before she came in. Waiting for stool sample.

## 2024-03-10 NOTE — NURSING NOTE
EOS notes: pt is A/Ox4. Up with SB assist. NSR-Tachy on tele. No reports of dizziness, N/V or SOB. VSS. Q8 VS ordered. Meds given and tolerated well by patient. IV mag, and PO K+ given per order.  Makes freq trips to the bathroom, bed alarm on. Has a recent Hx of multiple falls. Call light within reach, bed in lowest position.

## 2024-03-10 NOTE — H&P
"History Of Present Illness  Sang Joy \"Chavez\" is a 68 y.o. female with medical history significant for vertebrobasilar artery syndrome, asthma, chronic malnutrition with underweight BMI, recurrent syncope, and frequent falls presenting to [facility] on 3/9/2024 with complaint of syncopal episode.    She says she actually felt pretty good this morning and was prepping some food around noon when she says \"I turned around in the kitchen and down I went\".  The syncopal episode was preceded by shortness of breath, and diaphoresis; she also notes some new onset diarrhea.  She had a second syncopal episode while trying to get to the bathroom.  Both of these episodes were witnessed and there was not any reported seizure activity.  Chart review indicates a history of similar episodes and she is in the process of seeing multiple specialty providers for further workup and has some upcoming tests scheduled with neurology.     Labs tonight are remarkable for electrolyte depletion with sodium 129, potassium 3.5 which is replaced, and magnesium of 1.4 which is replaced, acute renal failure with BUN 29 and creatinine 1.34 compared to normal baseline, and mild hyperglycemia at 178.  Note is made of hemoglobin of 9.2 compared to 13.4 on December 25, 2023, she says she has not noticed any bleeding from anywhere nor any black or tarry stool.  Imaging is unremarkable for acute issue.  She does have some small skin tears to her left forearm that were addressed by emergency department staff.    Chart review indicates that she has a previously established DO NOT RESUSCITATE comfort care CODE STATUS.  I talked to her about this at bedside and she says that she still wants to stay DNR comfort care.  I talked to her about the different interventions I wanted to initiate including IV fluid and repeat labs in the morning and she said those sounded okay with her.  It looks like she also has some labs that were previously ordered by her " primary care physician and that this will be performed during this hospitalization as well.    Plan is to admit with telemetry as an inpatient for recurrent syncope, acute renal failure, acute on chronic anemia, and uncomplicated skin tear of the left forearm.    Past Medical History  Past Medical History:   Diagnosis Date    Acquired cataract     Anxiety     Asthma     Carpal tunnel syndrome     Cervical spine arthritis     Chronic anemia     Chronic malnutrition (CMS/HCC)     Episode of dizziness     Frequent falls     History of rib fracture     History of syncope     History of uterine leiomyoma     Hypertension     Migraines     Osteoporosis     Rheumatoid arthritis (CMS/HCC)     Spinal stenosis of lumbosacral region with radiculopathy     Subdural hygroma     Vertebrobasilar artery syndrome        Surgical History  Past Surgical History:   Procedure Laterality Date    APPENDECTOMY      DENTAL SURGERY      DILATION AND CURETTAGE OF UTERUS          Social History  Social History     Tobacco Use    Smoking status: Every Day     Packs/day: 0.50     Years: 15.00     Additional pack years: 0.00     Total pack years: 7.50     Types: Cigarettes    Smokeless tobacco: Never   Substance Use Topics    Alcohol use: Yes     Alcohol/week: 2.0 standard drinks of alcohol     Types: 2 Glasses of wine per week     Comment: 1-2 glasses of wine nightly    Drug use: Never        Family History  Family History   Problem Relation Name Age of Onset    Atrial fibrillation Mother Medina Logan     Hypertension Mother Medina Logan     Kidney disease Mother Medina Logan     Arthritis Father Cruz A Desmond     Atrial fibrillation Father Cruz A Desmond     Cancer Father Cruz A Desmond     Heart disease Father Cruz A Desmond     Hernia Father Cruz A Desmond     Hypertension Father Cruz A Desmond     Hypertension Maternal Grandmother Taina Randall     Stroke Maternal Grandmother Taina Randall     Heart disease Paternal Grandfather  Giovanni Logan     Hypertension Paternal Grandfather Giovanni Logan     Breast cancer Paternal Grandmother Essie Logan     Cancer Paternal Grandmother Essie Logan     Hypertension Paternal Grandmother Essie Logan     Atrial fibrillation Brother Barry Logan     Hypertension Brother Barry Logan     Hypertension Sister Ana María Denney         Allergies  Clarithromycin; Influenza virus vaccines; Iodine; Penicillins; Acetaminophen; Codeine; Tetanus toxoid, adsorbed; and Amoxicillin    Review of Systems   Constitutional:  Positive for appetite change and unexpected weight change. Negative for chills and fever.   HENT:  Negative for trouble swallowing.    Eyes:  Negative for visual disturbance.   Respiratory:  Positive for shortness of breath. Negative for cough and chest tightness.    Cardiovascular:  Negative for chest pain, palpitations and leg swelling.   Gastrointestinal:  Positive for diarrhea. Negative for abdominal pain, blood in stool, nausea and vomiting.   Genitourinary:  Negative for dysuria, flank pain and hematuria.   Musculoskeletal:  Negative for neck pain and neck stiffness.   Skin:  Positive for wound. Negative for rash.   Neurological:  Positive for syncope and weakness. Negative for dizziness and headaches.   Psychiatric/Behavioral:  Negative for confusion.             Physical Exam  Constitutional:       General: She is not in acute distress.     Appearance: Normal appearance.   HENT:      Head: Normocephalic.      Right Ear: External ear normal.      Left Ear: External ear normal.      Nose: Nose normal.      Mouth/Throat:      Mouth: Mucous membranes are moist.   Eyes:      General: No scleral icterus.  Cardiovascular:      Rate and Rhythm: Normal rate and regular rhythm.      Pulses: Normal pulses.   Pulmonary:      Effort: No respiratory distress.   Abdominal:      General: Bowel sounds are normal. There is no distension.      Palpations: Abdomen is soft.      Tenderness: There is no abdominal  "tenderness.   Musculoskeletal:      Cervical back: No rigidity or tenderness.      Right lower leg: No edema.      Left lower leg: No edema.   Skin:     Capillary Refill: Capillary refill takes less than 2 seconds.      Findings: Lesion present. No erythema or rash.      Comments: 2 areas of small skin tears to left forearm   Neurological:      General: No focal deficit present.      Mental Status: She is alert and oriented to person, place, and time. Mental status is at baseline.   Psychiatric:         Mood and Affect: Mood normal.         Behavior: Behavior normal.             Last Recorded Vitals  Visit Vitals  /73 (BP Location: Right arm, Patient Position: Lying)   Pulse 94   Temp 35.7 °C (96.3 °F) (Temporal)   Resp 16   Ht 1.575 m (5' 2\")   Wt (!) 41 kg (90 lb 6.2 oz)   SpO2 100%   BMI 16.53 kg/m²   Smoking Status Every Day   BSA 1.34 m²        Relevant Results  Results for orders placed or performed during the hospital encounter of 03/09/24 (from the past 24 hour(s))   CBC and Auto Differential   Result Value Ref Range    WBC 9.7 4.4 - 11.3 x10*3/uL    nRBC 0.0 0.0 - 0.0 /100 WBCs    RBC 2.76 (L) 4.00 - 5.20 x10*6/uL    Hemoglobin 9.2 (L) 12.0 - 16.0 g/dL    Hematocrit 27.6 (L) 36.0 - 46.0 %     80 - 100 fL    MCH 33.3 26.0 - 34.0 pg    MCHC 33.3 32.0 - 36.0 g/dL    RDW 12.6 11.5 - 14.5 %    Platelets 186 150 - 450 x10*3/uL    Neutrophils % 75.4 40.0 - 80.0 %    Immature Granulocytes %, Automated 0.3 0.0 - 0.9 %    Lymphocytes % 17.2 13.0 - 44.0 %    Monocytes % 6.5 2.0 - 10.0 %    Eosinophils % 0.3 0.0 - 6.0 %    Basophils % 0.3 0.0 - 2.0 %    Neutrophils Absolute 7.31 1.20 - 7.70 x10*3/uL    Immature Granulocytes Absolute, Automated 0.03 0.00 - 0.70 x10*3/uL    Lymphocytes Absolute 1.67 1.20 - 4.80 x10*3/uL    Monocytes Absolute 0.63 0.10 - 1.00 x10*3/uL    Eosinophils Absolute 0.03 0.00 - 0.70 x10*3/uL    Basophils Absolute 0.03 0.00 - 0.10 x10*3/uL   Comprehensive Metabolic Panel   Result " Value Ref Range    Glucose 178 (H) 74 - 99 mg/dL    Sodium 129 (L) 136 - 145 mmol/L    Potassium 3.5 3.5 - 5.3 mmol/L    Chloride 100 98 - 107 mmol/L    Bicarbonate 19 (L) 21 - 32 mmol/L    Anion Gap 14 mmol/L    Urea Nitrogen 29 (H) 6 - 23 mg/dL    Creatinine 1.48 (H) 0.50 - 1.05 mg/dL    eGFR 39 (L) >60 mL/min/1.73m*2    Calcium 8.5 (L) 8.6 - 10.3 mg/dL    Albumin 3.7 3.4 - 5.0 g/dL    Alkaline Phosphatase 73 33 - 136 U/L    Total Protein 5.6 (L) 6.4 - 8.2 g/dL    AST 38 9 - 39 U/L    Bilirubin, Total 0.7 0.0 - 1.2 mg/dL    ALT 21 7 - 45 U/L   Magnesium   Result Value Ref Range    Magnesium 1.40 (L) 1.60 - 2.40 mg/dL   aPTT   Result Value Ref Range    aPTT 25 (L) 27 - 38 seconds   Protime-INR   Result Value Ref Range    Protime 10.3 9.8 - 12.8 seconds    INR 0.9 0.9 - 1.1   B-Type Natriuretic Peptide   Result Value Ref Range     (H) 0 - 99 pg/mL   Troponin, High Sensitivity, 1 Hour   Result Value Ref Range    Troponin I, High Sensitivity 5 0 - 13 ng/L   TSH with reflex to Free T4 if abnormal   Result Value Ref Range    Thyroid Stimulating Hormone 2.79 0.44 - 3.98 mIU/L   D-dimer, VTE Exclusion   Result Value Ref Range    D-Dimer, Quantitative VTE Exclusion 410 <=500 ng/mL FEU   Reticulocytes   Result Value Ref Range    Retic % 0.9 0.5 - 2.0 %    Retic Absolute 0.026 0.017 - 0.110 x10*6/uL    Reticulocyte Hemoglobin 37 28 - 38 pg    Immature Retic fraction 1.7 <=16.0 %   LDH, Lactate dehydrogenase   Result Value Ref Range     84 - 246 U/L        Home Medications  Prior to Admission medications    Medication Sig Start Date End Date Taking? Authorizing Provider   ALBUTEROL SULFATE INHL Inhale 2 puffs every 6 hours if needed.    Historical Provider, MD   amLODIPine (Norvasc) 5 mg tablet Take 0.5 tablets (2.5 mg) by mouth once daily.  Patient taking differently: Take 1 tablet (5 mg) by mouth once daily. 1/29/24 2/28/24  Ramiro Hopper, DO   calcium-vitamin D3-vitamin K (Viactiv) 650 mg-12.5 mcg-40 mcg  chewable tablet Chew 2 tablets once daily.    Historical Provider, MD   cholecalciferol (Vitamin D3) 50 MCG (2000 UT) tablet Take 1 tablet (50 mcg) by mouth once daily.    Historical Provider, MD   fluticasone (Flonase Allergy Relief) 50 mcg/actuation nasal spray 1 sprays, Nasal, DAILY, in each nostril, Refill(s) 0 7/8/14   Historical Provider, MD   furosemide (Lasix) 40 mg tablet Take 1 tablet (40 mg) by mouth once daily as needed.    Historical Provider, MD   hydroxychloroquine (Plaquenil) 200 mg tablet Take 1 tablet (200 mg) by mouth once daily.    Historical Provider, MD   loratadine 10 mg tablet,chewable Chew once every 24 hours.    Historical Provider, MD   losartan (Cozaar) 100 mg tablet Take 1 tablet (100 mg) by mouth once daily.    Historical Provider, MD   magnesium oxide (Mag-Ox) 400 mg tablet Take 1 tablet (400 mg) by mouth once daily.    Historical Provider, MD   meclizine (Antivert) 12.5 mg tablet Take 1 tablet (12.5 mg) by mouth 3 times a day as needed for dizziness.    Historical Provider, MD   metoprolol succinate XL (Toprol-XL) 25 mg 24 hr tablet Take 1 tablet (25 mg) by mouth once daily. 1/30/24   Historical Provider, MD   multivitamin capsule 1 capsule once daily.    Historical Provider, MD   omega-3 (Fish Oil) 60- mg capsule Take 1 capsule (500 mg) by mouth once daily. David red    Historical Provider, MD   omeprazole OTC (PriLOSEC OTC) 20 mg EC tablet 1 tablet (20 mg). 7/8/14   Historical Provider, MD   POTASSIUM GLUCONATE ORAL Take 90 mg by mouth once daily.    Historical Provider, MD   Ca carb-Ca gluc-Mg ox-Mg gluco (Calcium Magnesium) 500 mg calcium -250 mg tablet Take by mouth.  3/9/24  Historical Provider, MD   colchicine (Mitigare) 0.6 mg capsule capsule Take 1 capsule (0.6 mg) by mouth once daily. When needed  3/7/24  Historical Provider, MD   cyanocobalamin, vitamin B-12, 3,000 mcg capsule Take 3,000 mg by mouth once daily.  3/9/24  Historical Provider, MD   cyclobenzaprine  (Flexeril) 10 mg tablet Take 1 tablet (10 mg) by mouth 2 times a day as needed. asneeded  3/7/24  Historical Provider, MD   fish oil concentrate (Omega-3) 120-180 mg capsule Take 1 capsule (1 g) by mouth once daily.  3/9/24  Historical Provider, MD   fluticasone (Flonase Allergy Relief) 50 mcg/actuation nasal spray Administer 1 spray into affected nostril(s) once daily.  3/7/24  Historical Provider, MD   furosemide (Lasix) 40 mg tablet Take 1 tablet (40 mg) by mouth once daily. When needed 7/13/21 3/7/24  Historical Provider, MD   loratadine-pseudoephedrine (Claritin-D 24-hour)  mg 24 hr tablet Take 10 mg by mouth once daily. 7/8/14 3/9/24  Historical Provider, MD   meclizine (Antivert) 12.5 mg tablet Take 1 tablet (12.5 mg) by mouth 3 times a day as needed for dizziness.  3/7/24  Historical Provider, MD   traMADol (Ultram) 50 mg tablet Take 1 tablet (50 mg) by mouth every 6 hours if needed.  3/7/24  Historical Provider, MD   zinc acetate 50 mg (zinc) capsule Take 50 mg by mouth once daily.  3/7/24  Historical Provider, MD       Medications  Scheduled medications  lactobacillus acidophilus, 1 capsule, oral, BID  sodium chloride 0.9%, 10 mL, intravenous, q8h LEANN      Continuous medications  lactated Ringer's, 50 mL/hr, Last Rate: 50 mL/hr (03/10/24 0148)      PRN medications  melatonin, 3 mg, Nightly PRN  ondansetron ODT, 4 mg, q8h PRN   Or  ondansetron, 4 mg, q8h PRN  polyethylene glycol, 17 g, Daily PRN        Imaging  CT head wo IV contrast    Result Date: 3/9/2024  Interpreted By:  Sai Guardado, STUDY: CT HEAD WO IV CONTRAST; CT CERVICAL SPINE WO IV CONTRAST;  3/9/2024 6:42 pm   INDICATION: Signs/Symptoms:syncope.   COMPARISON: CT brain 12/25/2023 and 01/29/2024   ACCESSION NUMBER(S): [id number]; [id number]   ORDERING CLINICIAN: SIRENA BERLANT   TECHNIQUE: Noncontrast axial CT scan of the brain and cervical spine was performed.   FINDINGS: BRAIN:   Parenchyma: There is no intracranial hemorrhage.  The grey-white differentiation is intact. There is no mass effect or midline shift. Patchy supratentorial hypodensity, nonspecific, but likely secondary to mild chronic microvascular ischemia.   CSF Spaces: Left-greater-than-right prominence of the bifrontal parietal extra-axial spaces. Mild generalized volume loss.   Extra-Axial Fluid: There is no extraaxial fluid collection.   Calvarium: The calvarium is unremarkable.   Paranasal sinuses: Visualized paranasal sinuses are clear.   Mastoids: Clear.   Orbits: Normal.   Soft tissues: Unremarkable.   CERVICAL SPINE:   ALIGNMENT: Mild reversal of the cervical lordosis. Atlantoaxial interval is maintained. Grade 1 anterolisthesis C3 on C4 and C4 on C5. Vertebral body heights are maintained. Multilevel loss of disc space most prominent at C5-C6 and C6-C7.   VERTEBRAL BODIES AND POSTERIOR ELEMENTS: No cervical spine compression fracture.  No posterior element fracture.  No destructive bone lesion.   SPINAL CANAL: Multilevel degenerative changes with up to mild canal stenosis and moderate neural foraminal narrowing most prominent at C5-C6.   NECK SOFT TISSUES: Within normal limits.   LUNG APICES: Imaged portion of the lung apices are within normal limits.   SKULL BASE: Within normal limits.       Brain: No acute intracranial hemorrhage, mass effect, or CT apparent acute infarct. Chronic microvascular ischemic and involutional changes.   Cervical spine: No acute fracture or traumatic malalignment Degenerative changes, most prominent at C5-C6.     Signed by: Sai Guardado 3/9/2024 7:22 PM Dictation workstation:   [id number]    CT cervical spine wo IV contrast    Result Date: 3/9/2024  Interpreted By:  Sai Guardado, STUDY: CT HEAD WO IV CONTRAST; CT CERVICAL SPINE WO IV CONTRAST;  3/9/2024 6:42 pm   INDICATION: Signs/Symptoms:syncope.   COMPARISON: CT brain 12/25/2023 and 01/29/2024   ACCESSION NUMBER(S): [id number]; [id number]   ORDERING CLINICIAN: SIRENA BOWMAN    TECHNIQUE: Noncontrast axial CT scan of the brain and cervical spine was performed.   FINDINGS: BRAIN:   Parenchyma: There is no intracranial hemorrhage. The grey-white differentiation is intact. There is no mass effect or midline shift. Patchy supratentorial hypodensity, nonspecific, but likely secondary to mild chronic microvascular ischemia.   CSF Spaces: Left-greater-than-right prominence of the bifrontal parietal extra-axial spaces. Mild generalized volume loss.   Extra-Axial Fluid: There is no extraaxial fluid collection.   Calvarium: The calvarium is unremarkable.   Paranasal sinuses: Visualized paranasal sinuses are clear.   Mastoids: Clear.   Orbits: Normal.   Soft tissues: Unremarkable.   CERVICAL SPINE:   ALIGNMENT: Mild reversal of the cervical lordosis. Atlantoaxial interval is maintained. Grade 1 anterolisthesis C3 on C4 and C4 on C5. Vertebral body heights are maintained. Multilevel loss of disc space most prominent at C5-C6 and C6-C7.   VERTEBRAL BODIES AND POSTERIOR ELEMENTS: No cervical spine compression fracture.  No posterior element fracture.  No destructive bone lesion.   SPINAL CANAL: Multilevel degenerative changes with up to mild canal stenosis and moderate neural foraminal narrowing most prominent at C5-C6.   NECK SOFT TISSUES: Within normal limits.   LUNG APICES: Imaged portion of the lung apices are within normal limits.   SKULL BASE: Within normal limits.       Brain: No acute intracranial hemorrhage, mass effect, or CT apparent acute infarct. Chronic microvascular ischemic and involutional changes.   Cervical spine: No acute fracture or traumatic malalignment Degenerative changes, most prominent at C5-C6.     Signed by: Sai Guardado 3/9/2024 7:22 PM Dictation workstation:   [id number]    XR chest 1 view    Result Date: 3/9/2024  Interpreted By:  Mason Spencer, STUDY: XR CHEST 1 VIEW;  3/9/2024 5:47 pm   INDICATION: Signs/Symptoms:Chest Pain.   COMPARISON: Chest radiograph  12/25/2023   ACCESSION NUMBER(S): [id number]   ORDERING CLINICIAN: SIRENA BOWMAN   FINDINGS:     CARDIOMEDIASTINAL SILHOUETTE: Cardiomediastinal silhouette is normal in size and configuration.   LUNGS: No consolidation, pneumothorax, or significant effusion.   ABDOMEN: No remarkable upper abdominal findings.   BONES: No acute osseous changes.       1.  No evidence of acute cardiopulmonary process.     Signed by: Mason Spencer 3/9/2024 6:37 PM Dictation workstation:   [id number]       Assessment/Plan   Principal Problem:    Syncope and collapse  Active Problems:    Acute renal failure (ARF) (CMS/[facility])    Electrolyte depletion    Acute on chronic anemia        Plan:  Electrolyte replacement, IV fluid resuscitation, repeat labs in the morning, STEFANIE hose, up with assist only, PT/OT evaluations    Check iron stores, fecal occult blood    Plan to resume home medications as appropriate, see orders for additional plan elements, management deferred to attending and consult physicians.    VTE prophylaxis:   DVT prophylaxis: SCDs      Code Status  DNR Comfort Measures Only    I spent 40 minutes in the professional and overall care of this patient.      Alexandra Rapp, KERRY-CNP  [facility] pager [phone]

## 2024-03-11 ENCOUNTER — APPOINTMENT (OUTPATIENT)
Dept: CARDIOLOGY | Facility: HOSPITAL | Age: 69
DRG: 683 | End: 2024-03-11
Payer: MEDICARE

## 2024-03-11 VITALS
RESPIRATION RATE: 18 BRPM | TEMPERATURE: 97.3 F | DIASTOLIC BLOOD PRESSURE: 67 MMHG | OXYGEN SATURATION: 100 % | SYSTOLIC BLOOD PRESSURE: 123 MMHG | WEIGHT: 90.39 LBS | BODY MASS INDEX: 16.63 KG/M2 | HEART RATE: 70 BPM | HEIGHT: 62 IN

## 2024-03-11 LAB
ANION GAP SERPL CALC-SCNC: 12 MMOL/L (ref 10–20)
AORTIC VALVE MEAN GRADIENT: 5.2 MMHG
AORTIC VALVE PEAK VELOCITY: 1.61 M/S
AV PEAK GRADIENT: 10.4 MMHG
BUN SERPL-MCNC: 13 MG/DL (ref 6–23)
CALCIUM SERPL-MCNC: 8.5 MG/DL (ref 8.6–10.3)
CHLORIDE SERPL-SCNC: 102 MMOL/L (ref 98–107)
CO2 SERPL-SCNC: 23 MMOL/L (ref 21–32)
CREAT SERPL-MCNC: 0.77 MG/DL (ref 0.5–1.05)
EGFRCR SERPLBLD CKD-EPI 2021: 85 ML/MIN/1.73M*2
ERYTHROCYTE [DISTWIDTH] IN BLOOD BY AUTOMATED COUNT: 12.6 % (ref 11.5–14.5)
GLUCOSE SERPL-MCNC: 96 MG/DL (ref 74–99)
HAPTOGLOB SERPL-MCNC: 159 MG/DL (ref 37–246)
HCT VFR BLD AUTO: 33.1 % (ref 36–46)
HGB BLD-MCNC: 10.3 G/DL (ref 12–16)
LEFT ATRIUM VOLUME AREA LENGTH INDEX BSA: 15.5 ML/M2
LEFT VENTRICLE INTERNAL DIMENSION DIASTOLE: 3.63 CM (ref 3.5–6)
MAGNESIUM SERPL-MCNC: 1.74 MG/DL (ref 1.6–2.4)
MCH RBC QN AUTO: 32 PG (ref 26–34)
MCHC RBC AUTO-ENTMCNC: 31.1 G/DL (ref 32–36)
MCV RBC AUTO: 103 FL (ref 80–100)
NRBC BLD-RTO: 0 /100 WBCS (ref 0–0)
PHOSPHATE SERPL-MCNC: 2.7 MG/DL (ref 2.5–4.9)
PLATELET # BLD AUTO: 206 X10*3/UL (ref 150–450)
POTASSIUM SERPL-SCNC: 4.1 MMOL/L (ref 3.5–5.3)
RBC # BLD AUTO: 3.22 X10*6/UL (ref 4–5.2)
RIGHT VENTRICLE FREE WALL PEAK S': 12 CM/S
RIGHT VENTRICLE PEAK SYSTOLIC PRESSURE: 17.6 MMHG
SODIUM SERPL-SCNC: 133 MMOL/L (ref 136–145)
TRICUSPID ANNULAR PLANE SYSTOLIC EXCURSION: 2.1 CM
WBC # BLD AUTO: 8.3 X10*3/UL (ref 4.4–11.3)

## 2024-03-11 PROCEDURE — 2500000001 HC RX 250 WO HCPCS SELF ADMINISTERED DRUGS (ALT 637 FOR MEDICARE OP): Performed by: NURSE PRACTITIONER

## 2024-03-11 PROCEDURE — 84100 ASSAY OF PHOSPHORUS: CPT | Performed by: NURSE PRACTITIONER

## 2024-03-11 PROCEDURE — 85027 COMPLETE CBC AUTOMATED: CPT | Performed by: NURSE PRACTITIONER

## 2024-03-11 PROCEDURE — 36415 COLL VENOUS BLD VENIPUNCTURE: CPT | Performed by: NURSE PRACTITIONER

## 2024-03-11 PROCEDURE — 2500000001 HC RX 250 WO HCPCS SELF ADMINISTERED DRUGS (ALT 637 FOR MEDICARE OP): Performed by: PHYSICIAN ASSISTANT

## 2024-03-11 PROCEDURE — 80048 BASIC METABOLIC PNL TOTAL CA: CPT | Performed by: NURSE PRACTITIONER

## 2024-03-11 PROCEDURE — 2500000002 HC RX 250 W HCPCS SELF ADMINISTERED DRUGS (ALT 637 FOR MEDICARE OP, ALT 636 FOR OP/ED): Performed by: PHYSICIAN ASSISTANT

## 2024-03-11 PROCEDURE — 93306 TTE W/DOPPLER COMPLETE: CPT

## 2024-03-11 PROCEDURE — 83735 ASSAY OF MAGNESIUM: CPT | Performed by: NURSE PRACTITIONER

## 2024-03-11 PROCEDURE — 2500000004 HC RX 250 GENERAL PHARMACY W/ HCPCS (ALT 636 FOR OP/ED): Performed by: PHYSICIAN ASSISTANT

## 2024-03-11 RX ADMIN — HYDROXYCHLOROQUINE SULFATE 200 MG: 200 TABLET, FILM COATED ORAL at 09:02

## 2024-03-11 RX ADMIN — Medication 2000 UNITS: at 09:02

## 2024-03-11 RX ADMIN — Medication 400 MG: at 09:02

## 2024-03-11 RX ADMIN — FLUTICASONE PROPIONATE 1 SPRAY: 50 SPRAY, METERED NASAL at 09:01

## 2024-03-11 RX ADMIN — Medication 10 ML: at 13:25

## 2024-03-11 RX ADMIN — AMLODIPINE BESYLATE 5 MG: 5 TABLET ORAL at 09:02

## 2024-03-11 RX ADMIN — Medication 10 ML: at 06:10

## 2024-03-11 RX ADMIN — METOPROLOL SUCCINATE 25 MG: 25 TABLET, EXTENDED RELEASE ORAL at 09:02

## 2024-03-11 RX ADMIN — LORATADINE 10 MG: 10 TABLET ORAL at 09:02

## 2024-03-11 RX ADMIN — Medication 1 CAPSULE: at 09:01

## 2024-03-11 RX ADMIN — PANTOPRAZOLE SODIUM 40 MG: 40 TABLET, DELAYED RELEASE ORAL at 06:10

## 2024-03-11 RX ADMIN — Medication 1 CAPSULE: at 09:02

## 2024-03-11 SDOH — HEALTH STABILITY: PHYSICAL HEALTH: ON AVERAGE, HOW MANY MINUTES DO YOU ENGAGE IN EXERCISE AT THIS LEVEL?: 0 MIN

## 2024-03-11 SDOH — HEALTH STABILITY: MENTAL HEALTH
STRESS IS WHEN SOMEONE FEELS TENSE, NERVOUS, ANXIOUS, OR CAN'T SLEEP AT NIGHT BECAUSE THEIR MIND IS TROUBLED. HOW STRESSED ARE YOU?: RATHER MUCH

## 2024-03-11 SDOH — ECONOMIC STABILITY: FOOD INSECURITY: WITHIN THE PAST 12 MONTHS, YOU WORRIED THAT YOUR FOOD WOULD RUN OUT BEFORE YOU GOT MONEY TO BUY MORE.: NEVER TRUE

## 2024-03-11 SDOH — ECONOMIC STABILITY: FOOD INSECURITY: WITHIN THE PAST 12 MONTHS, THE FOOD YOU BOUGHT JUST DIDN'T LAST AND YOU DIDN'T HAVE MONEY TO GET MORE.: NEVER TRUE

## 2024-03-11 SDOH — SOCIAL STABILITY: SOCIAL NETWORK: ARE YOU MARRIED, WIDOWED, DIVORCED, SEPARATED, NEVER MARRIED, OR LIVING WITH A PARTNER?: MARRIED

## 2024-03-11 SDOH — HEALTH STABILITY: PHYSICAL HEALTH: ON AVERAGE, HOW MANY DAYS PER WEEK DO YOU ENGAGE IN MODERATE TO STRENUOUS EXERCISE (LIKE A BRISK WALK)?: 0 DAYS

## 2024-03-11 SDOH — SOCIAL STABILITY: SOCIAL NETWORK: IN A TYPICAL WEEK, HOW MANY TIMES DO YOU TALK ON THE PHONE WITH FAMILY, FRIENDS, OR NEIGHBORS?: THREE TIMES A WEEK

## 2024-03-11 ASSESSMENT — COGNITIVE AND FUNCTIONAL STATUS - GENERAL
MOBILITY SCORE: 24
DAILY ACTIVITIY SCORE: 24

## 2024-03-11 ASSESSMENT — PAIN - FUNCTIONAL ASSESSMENT: PAIN_FUNCTIONAL_ASSESSMENT: 0-10

## 2024-03-11 ASSESSMENT — PAIN SCALES - GENERAL: PAINLEVEL_OUTOF10: 0 - NO PAIN

## 2024-03-11 NOTE — PROGRESS NOTES
"Physical Therapy                 Therapy Communication Note    Patient Name: Sang Joy \"Chavez\"  MRN: [id number]  Today's Date: 3/11/2024     Discipline: Physical Therapy    Comment: Pt chart reviewed and spoke to pt's nurse on floor. Pt has been up ad srikanth IND in room. Pt demonstrates IND with gait without device, steady. Pt denies any PT or DME needs at this time. Will dc PT order. Please re-order if there is a change in mobility or as appropriate.  "

## 2024-03-11 NOTE — NURSING NOTE
EOS: Patient rested well through the night. She has had no complaints of pain or discomfort this shift. Her safety has been maintained. She is stable at the time of writing.

## 2024-03-11 NOTE — NURSING NOTE
0700: Assumed care of pt. At this time.    1050: Pt. Returned from [facility] at this time.    1330: Pt. Still pending echo to be read and DC at this time. The pt. Had a formed BM this shift and message sent to team to see if precautions and c-diff order can be Dc'ed. Lab will not accept the formed stool. Will reach out to cardiology to see if he is able to read echocardiogram.     1335: Pt. Stool sample cancelled per Marjorie Oneal.    1645: PT. DC at this time. All instructions reviewed and all questions addressed. The pt. Is aware of appts and has no further new medication changes. The pt. IV removed. Will take pt. Out in a WC at this time.  to transport

## 2024-03-11 NOTE — PROGRESS NOTES
"Occupational Therapy                 Therapy Communication Note    Patient Name: Sang Joy \"Chavez\"  MRN: [id number]  Today's Date: 3/11/2024     Discipline: Occupational Therapy      Comment:  OT orders received and chart reviewed. Spoke to pt. States she's been getting up independently. Pt. Demos independence in room. Pt. Has no skilled OT needs, pt. In agreement. Will sign off.   "

## 2024-03-11 NOTE — PROGRESS NOTES
03/11/24 1138   Discharge Planning   Number of Stairs to Enter Residence 4   Number of Stairs Within Residence 10  (basement)   Do you have animals or pets at home? No   Who is requesting discharge planning? Provider     PCP Dr Anika Roman. Ist visit was 1 week ago.  Orthopedic  Is with [facility].  No formal exercise due to her Rheumatoid Arthritis .  Does have a diet  for her arthritis. [facility]. Able to avoid , obtain and understand her medications.  High amount of stress at home. Her  broke several  ribs when he fell trying to pick her up prior to this admission. Her mother is in the memory care unit at the [facility]. Her mother in-law  (98) is in assisted living. States stress level has decreased. Started crying in the room. Decline our .  Wants to be discharged today. She has paper work that needs to be taken to the  today. Plan is discharge home.

## 2024-03-11 NOTE — CONSULTS
"Nutrition Assessment Note  Nutrition Assessment      Reason for Assessment  Reason for Assessment: Provider consult order (underweight and unplanned weight loss)  Nutrition Note:  Sang Joy \"Chavez\" is a 68 y.o. female presenting with syncope and frequent falls.     Past Medical History   has a past medical history of Acquired cataract, Anxiety, Asthma, Carpal tunnel syndrome, Cervical spine arthritis, Chronic anemia, Chronic malnutrition (CMS/HCC), Episode of dizziness, Frequent falls, History of rib fracture, History of syncope, History of uterine leiomyoma, Hypertension, Migraines, Osteoporosis, Rheumatoid arthritis (CMS/HCC), Spinal stenosis of lumbosacral region with radiculopathy, Subdural hygroma, and Vertebrobasilar artery syndrome.  Surgical History   has a past surgical history that includes Appendectomy; Dilation and curettage of uterus; and Dental surgery.    History:  Food and Nutrient History  Energy Intake: Good > 75 %  Food and Nutrient History: Pt thinks she eats just fine. She is annoyed with the nutrition consult and thinks it may be because she is anemic and then states \"she was born with anemia\". She then explains that she lost over 40# several years ago when she was the primary caregiver to her parents and her spouse plus working a full-time job. She explains that the significant weight loss was certainly not planned, but she did not have many options. She has maintained her weight between # over the past 2 years. She states she used to weigh 90# when she was  and shows her wrists to show her smal bone structure. She is not concerned and does not think she needs any further nutrition intervention. When asked about her diarrhea, she states she is severely allergic to garlic and thinks the potato soup had garlic in it and the diarrhea occured within 30 minutes of her drinking the soup.  Vitamin/Herbal Supplement Use: Viactiv, Vt D3, MVI, Omega 3  Current Diet: Adult diet " "Regular  Food allergies: NKFA. is allergic to clarithromycin; garlic; influenza virus vaccines; iodine; penicillins; acetaminophen; codeine; tetanus toxoid, adsorbed; and amoxicillin.    GI assessment: Pt reports diarrhea.  Oral Problems: denies  Mobility: independent (denies weakness)    Pain Assessment: 0-10  Pain Score: 0 - No pain  Pain Type: Acute pain    Vitals: Blood pressure 123/67, pulse 70, temperature 36.3 °C (97.3 °F), resp. rate 18, height 1.575 m (5' 2\"), weight (!) 41 kg (90 lb 6.2 oz), SpO2 100 %.    Recent Lab Results:  Lab Results   Component Value Date    GLUCOSE 96 03/11/2024    CALCIUM 8.5 (L) 03/11/2024     (L) 03/11/2024    K 4.1 03/11/2024    CO2 23 03/11/2024     03/11/2024    BUN 13 03/11/2024    CREATININE 0.77 03/11/2024     Lab Results   Component Value Date    HGBA1C 5.8 (H) 03/09/2024           Medications reviewed:  amLODIPine, 5 mg, oral, Daily  B complex-vitamin C-folic acid, 1 capsule, oral, Daily  cholecalciferol, 2,000 Units, oral, Daily  fluticasone, 1 spray, Each Nostril, Daily  hydroxychloroquine, 200 mg, oral, Daily  lactobacillus acidophilus, 1 capsule, oral, BID  loratadine, 10 mg, oral, Daily  [Held by provider] losartan, 100 mg, oral, Daily  magnesium oxide, 400 mg, oral, Daily  metoprolol succinate XL, 25 mg, oral, Daily  pantoprazole, 40 mg, oral, Daily before breakfast  perflutren lipid microspheres, 0.5-10 mL of dilution, intravenous, Once in imaging  perflutren protein A microsphere, 0.5 mL, intravenous, Once in imaging  sodium chloride 0.9%, 10 mL, intravenous, q8h LEANN             Height:   Admission Weight: (!) 39.9 kg (87 lb 15.4 oz)   Weight history/ % weight change:   Significant Weight Loss:  Interpretation of Weight Loss:                              Anthropometrics:  Height: 157.5 cm (5' 2\")  Weight: (!) 41 kg (90 lb 6.2 oz)  BMI (Calculated): 16.53    Weight Change: 2.76    Weight Change  Weight History / % Weight Change: (1/29/24) 40.8kg; " "(12/25/23) 39.5kg; (5/24/23) 44 kg; (8/2022) 45.8kg  Significant Weight Loss: No         IBW/kg (Dietitian Calculated): 49.9 kg  Percent of IBW: 82.17 %            Frame Size: Small              Amputation Calculations:       BMI/Z-Score:  Facility age limit for growth %gal is 20 years.    Energy Needs:  Calculated Energy Needs Using Equations  Height: 157.5 cm (5' 2\")  Weight Used for Equation Calculations: 41 kg (90 lb 6.2 oz)  Johnson- St. Jeor Equation (Overweight or Obese Patients): 903  Temp: 36.3 °C (97.3 °F)    Estimated Energy Needs  Total Energy Estimated Needs (kCal):  (3237-4572)  Method for Estimating Needs: 30-35 kcal/kg    Estimated Protein Needs  Total Protein Estimated Needs (g):  (45-49)  Method for Estimating Needs: 1.1-1.2g/kg    Estimated Fluid Needs  Method for Estimating Needs: >1500mL/day         Nutrition Focused Physical Findings:  Subcutaneous Fat Loss  Orbital Fat Pads: Defer    Muscle Wasting  Temporalis: Defer    Edema  Edema: none         Physical Findings (Nutrition Deficiency/Toxicity)  Skin: Negative (no open areas)       Nutrition Diagnosis   Malnutrition Diagnosis  Patient has Malnutrition Diagnosis: No    Patient has Nutrition Diagnosis: Yes  Nutrition Diagnosis 1: Underweight  Diagnosis Status (1): New  Related to (1): lifestyle choices  As Evidenced by (1): BMI 16.53       Nutrition Diagnosis 2: Not ready for diet/lifestyle change  Diagnosis Status (2): New  Related to (2): weight gain  As Evidenced by (2): pt refuses nutrition intervention                           Nutrition Interventions/Recommendations   Nutrition Prescription  Individualized Nutrition Prescription Provided for : Oral Diet    Food and/or Nutrient Delivery Interventions  Meals and Snacks: Energy-modified diet, Modify schedule of foods/fluids  Goal: Recommend to continue REGULAR diet. (Pt would benefit from 6 small meals incoporating high calorie/high protein food choices.)                  Medical Food " Supplement: Commercial beverage  Goal: Pt would benefit from oral nutrition supplement between meals (but is not interested at this time).                                       Coordination of Nutrition Care by a Nutrition Professional  Goal: Case discussed with NEIL Pineda - pt expected to discharge today.    Education Documentation  No documentation found.    Pt not interested at this time.        Nutrition Monitoring and Evaluation   Food and Nutrient Related History            Amount of Food: Estimated amout of food  Criteria: Pt to consume 3 meals per day of >75%                             Anthropometrics: Body Composition/Growth/Weight History                      Biochemical Data, Medical Tests and Procedures  Electrolyte and Renal Panel: Sodium  Criteria: to improve to normal levels                        Nutrition Focused Physical Findings            Digestive System: Diarrhea  Criteria: Diarrhea to improve.                        Follow Up  Time Spent (min): 45 minutes  Last Date of Nutrition Visit: 03/11/24  Nutrition Follow-Up Needed?: 3-8 days  Follow up Comment: KIM

## 2024-03-12 ENCOUNTER — PATIENT OUTREACH (OUTPATIENT)
Dept: PRIMARY CARE | Facility: CLINIC | Age: 69
End: 2024-03-12
Payer: MEDICARE

## 2024-03-12 RX ORDER — MECLIZINE HCL 12.5 MG 12.5 MG/1
12.5 TABLET ORAL 3 TIMES DAILY PRN
Qty: 30 TABLET | OUTPATIENT
Start: 2024-03-12

## 2024-03-12 NOTE — PROGRESS NOTES
Discharge Facility: [facility]    Discharge Diagnosis:   Syncope and collapse  Acute kidney injury  Hypokalemia  Hypomagnesemia  Acute on chronic anemia    Admission Date: 3/9/2024  Discharge Date: 3/11/2024    PCP Appointment Date: 3/14/2024  Specialist Appointment Date: rheumatology 5/15/2024  Neurology 5/30/2024  Hospital Encounter and Summary: Linked  See discharge assessment below for further details    No Med changes    Engagement  Call Start Time: 0930 (3/12/2024  9:30 AM)    Medications  Medications reviewed with patient/caregiver?: Yes (3/12/2024  9:30 AM)  Is the patient having any side effects they believe may be caused by any medication additions or changes?: No (3/12/2024  9:30 AM)  Does the patient have all medications ordered at discharge?: Yes (3/12/2024  9:30 AM)  Care Management Interventions: No intervention needed (3/12/2024  9:30 AM)  Prescription Comments: no change in meds (3/12/2024  9:30 AM)  Is the patient taking all medications as directed (includes completed medication regime)?: Yes (3/12/2024  9:30 AM)  Medication Comments: see med list (3/12/2024  9:30 AM)    Appointments  Does the patient have a primary care provider?: Yes (3/12/2024  9:30 AM)  Care Management Interventions: Verified appointment date/time/provider (3/12/2024  9:30 AM)  Has the patient kept scheduled appointments due by today?: Yes (3/12/2024  9:30 AM)  Care Management Interventions: Advised patient to keep appointment (3/12/2024  9:30 AM)    Self Management  What is the home health agency?: none (3/12/2024  9:30 AM)  Has home health visited the patient within 72 hours of discharge?: Not applicable (3/12/2024  9:30 AM)    Patient Teaching  Does the patient have access to their discharge instructions?: Yes (3/12/2024  9:30 AM)  Care Management Interventions: Reviewed instructions with patient (3/12/2024  9:30 AM)  What is the patient's perception of their health status since discharge?: Same (3/12/2024  9:30 AM)  Is the  patient/caregiver able to teach back the hierarchy of who to call/visit for symptoms/problems? PCP, Specialist, Home Health nurse, Urgent Care, ED, 911: Yes (3/12/2024  9:30 AM)    Wrap Up  Wrap Up Additional Comments: CTS spoke with patient. she was admitted to [facility] with syncope on 3/9/2024 . Discharged on 3/11/2024 with no med changes. Patient is still struggling with dizziness. Her neurology is with the [facility] and she does have a follow up with them in may. Can not get a tilt table test until august. She does have a follow up with PCP scheduled. No change in medications. No questions or concerns at this time. (3/12/2024  9:30 AM)  Call End Time: 0935 (3/12/2024  9:30 AM)

## 2024-03-12 NOTE — DISCHARGE SUMMARY
Discharge Diagnosis  Syncope and collapse  Acute kidney injury  Hypokalemia  Hypomagnesemia  Acute on chronic anemia    Discharge Meds     Your medication list        CHANGE how you take these medications        Instructions Last Dose Given Next Dose Due   amLODIPine 5 mg tablet  Commonly known as: Norvasc  What changed: how much to take      Take 0.5 tablets (2.5 mg) by mouth once daily.              CONTINUE taking these medications        Instructions Last Dose Given Next Dose Due   ALBUTEROL SULFATE INHL           Flonase Allergy Relief 50 mcg/actuation nasal spray  Generic drug: fluticasone           furosemide 40 mg tablet  Commonly known as: Lasix           hydroxychloroquine 200 mg tablet  Commonly known as: Plaquenil           loratadine 10 mg tablet,chewable           losartan 100 mg tablet  Commonly known as: Cozaar           magnesium oxide 400 mg tablet  Commonly known as: Mag-Ox           meclizine 12.5 mg tablet  Commonly known as: Antivert           metoprolol succinate XL 25 mg 24 hr tablet  Commonly known as: Toprol-XL           multivitamin capsule           omega-3 60- mg capsule  Commonly known as: Fish Oil           POTASSIUM GLUCONATE ORAL           PriLOSEC OTC 20 mg EC tablet  Generic drug: omeprazole OTC           Viactiv 650 mg-12.5 mcg-40 mcg chewable tablet  Generic drug: calcium-vitamin D3-vitamin K           Vitamin D3 50 MCG (2000 UT) tablet  Generic drug: cholecalciferol                    Test Results Pending At Discharge  Pending Labs       No current pending labs.            Hospital Course  Patient is a 68-year-old female admitted to the hospital with syncopal episode, patient was found to have acute kidney injury, hypokalemia and hypomagnesemia, worse on IV fluids, electrolytes were repleted, patient improved overnight, she was evaluated by PT/OT, patient was discharged to follow-up as an outpatient.    Discharge time 35 minutes    Pertinent Physical Exam At Time of  Discharge  Physical Exam  Constitutional:       Appearance: Normal appearance. She is normal weight.   HENT:      Head: Normocephalic and atraumatic.      Mouth/Throat:      Mouth: Mucous membranes are moist.      Pharynx: Oropharynx is clear.   Eyes:      Conjunctiva/sclera: Conjunctivae normal.      Pupils: Pupils are equal, round, and reactive to light.   Cardiovascular:      Rate and Rhythm: Normal rate and regular rhythm.      Pulses: Normal pulses.      Heart sounds: Normal heart sounds.   Pulmonary:      Effort: Pulmonary effort is normal.      Breath sounds: Normal breath sounds.   Abdominal:      General: Abdomen is flat. Bowel sounds are normal.      Palpations: Abdomen is soft.   Musculoskeletal:      Cervical back: Normal range of motion and neck supple.   Neurological:      General: No focal deficit present.      Mental Status: She is alert and oriented to person, place, and time. Mental status is at baseline.   Psychiatric:         Mood and Affect: Mood normal.         Thought Content: Thought content normal.         Outpatient Follow-Up  Future Appointments   Date Time Provider Department Center   4/10/2024  1:45 PM [facility] [id number] CT [id number] [facility] [address] [place]   4/10/2024  2:30 PM [facility] [id number] MAMMO [id number] [facility] [place]   4/10/2024  3:00 PM [facility] [id number] [id number] [facility] [place]   5/15/2024  1:00 PM Farhan Liz MD [id number] [facility]   9/23/2024 10:40 AM KERRY Prabhakar-CNP [id number] [facility]   3/10/2025 10:00 AM AHMET Prabhakar [id number] [facility]         Payam Albert MD

## 2024-03-12 NOTE — TELEPHONE ENCOUNTER
PT needs refill for...    meclizine (Antivert) 12.5 mg tablet     (PT is coming in for hosp fu 3/14/24 8:30 from a fall)    Thank you

## 2024-03-12 NOTE — TELEPHONE ENCOUNTER
Requested Prescriptions     Pending Prescriptions Disp Refills    meclizine (Antivert) 12.5 mg tablet 30 tablet      Sig: Take 1 tablet (12.5 mg) by mouth 3 times a day as needed for dizziness.

## 2024-03-14 ENCOUNTER — OFFICE VISIT (OUTPATIENT)
Dept: PRIMARY CARE | Facility: CLINIC | Age: 69
End: 2024-03-14
Payer: MEDICARE

## 2024-03-14 VITALS
RESPIRATION RATE: 20 BRPM | WEIGHT: 93 LBS | TEMPERATURE: 98.1 F | DIASTOLIC BLOOD PRESSURE: 50 MMHG | HEART RATE: 76 BPM | SYSTOLIC BLOOD PRESSURE: 92 MMHG | HEIGHT: 62 IN | BODY MASS INDEX: 17.11 KG/M2

## 2024-03-14 DIAGNOSIS — S51.819A SKIN TEAR OF FOREARM WITHOUT COMPLICATION, INITIAL ENCOUNTER: ICD-10-CM

## 2024-03-14 DIAGNOSIS — I95.2 HYPOTENSION DUE TO DRUGS: ICD-10-CM

## 2024-03-14 DIAGNOSIS — R55 SYNCOPE AND COLLAPSE: ICD-10-CM

## 2024-03-14 DIAGNOSIS — E87.8 ELECTROLYTE DEPLETION: ICD-10-CM

## 2024-03-14 DIAGNOSIS — R73.03 PRE-DIABETES: ICD-10-CM

## 2024-03-14 DIAGNOSIS — R42 VERTIGO: ICD-10-CM

## 2024-03-14 DIAGNOSIS — N17.9 ACUTE RENAL FAILURE, UNSPECIFIED ACUTE RENAL FAILURE TYPE (CMS-HCC): Primary | ICD-10-CM

## 2024-03-14 PROCEDURE — 99496 TRANSJ CARE MGMT HIGH F2F 7D: CPT | Performed by: NURSE PRACTITIONER

## 2024-03-14 RX ORDER — MECLIZINE HCL 12.5 MG 12.5 MG/1
12.5 TABLET ORAL 3 TIMES DAILY PRN
Qty: 30 TABLET | Refills: 1 | Status: SHIPPED | OUTPATIENT
Start: 2024-03-14

## 2024-03-14 ASSESSMENT — ENCOUNTER SYMPTOMS
WHEEZING: 0
FATIGUE: 0
NUMBNESS: 1
LIGHT-HEADEDNESS: 1
PALPITATIONS: 0
DIZZINESS: 1
FEVER: 0
SHORTNESS OF BREATH: 0

## 2024-03-14 NOTE — ASSESSMENT & PLAN NOTE
Pt. Was re-hydrated during her 3/9/24 hospitalization and eGFR returned to prior baseline WNL. Will repeat labs next week.

## 2024-03-14 NOTE — ASSESSMENT & PLAN NOTE
She had low Na 129, Mg 1.4, K 3.4 in the hospital, which were repleted and normalized prior to discharge. Will repeat labs next week.

## 2024-03-14 NOTE — PROGRESS NOTES
"Subjective   Sang Joy \"Chavez\" is a 68 y.o. female who presents for Follow-up (Hospital follow up ([facility]) 3/9/24 and kept for 2-3 nights. Pt fell after feeling lightheaded/dizziness and leg numbness. She hit the back of her head on the floor. Her  tried picking her up and her legs gave out and she passed out again and they both fell. She is wondering if the amlodipine 5mg is the cause of her episodes. She was only taking half and then in was increased to 5mg by her previous pcp. /).  HPI  Review of Systems   Constitutional:  Negative for fatigue and fever.   Respiratory:  Negative for shortness of breath and wheezing.    Cardiovascular:  Negative for chest pain, palpitations and leg swelling.   Neurological:  Positive for dizziness, light-headedness and numbness. Negative for syncope.     Objective   Physical Exam  Vitals reviewed.   Constitutional:       Appearance: Normal appearance.   HENT:      Head: Normocephalic.   Eyes:      Conjunctiva/sclera: Conjunctivae normal.   Cardiovascular:      Rate and Rhythm: Normal rate and regular rhythm.      Pulses: Normal pulses.   Pulmonary:      Breath sounds: Normal breath sounds.   Abdominal:      General: Bowel sounds are normal.      Palpations: Abdomen is soft.   Musculoskeletal:      Cervical back: Neck supple.   Skin:     General: Skin is warm and dry.      Findings: Ecchymosis (right forearm where IV had been while she was hospitalized.) and laceration (left forearm skin tear w/ steri-strips in place. No signs of infection.) present.   Neurological:      General: No focal deficit present.      Mental Status: She is alert.   Psychiatric:         Mood and Affect: Mood normal.         Thought Content: Thought content normal.       === 03/09/24 ===    MR HEAD ANGIO WO IV CONTRAST    - Impression -  MRI Brain:  1.  No evidence of infarct, or other acute intracranial abnormality.  2. Several tiny foci of nonspecific hyperintense parenchymal FLAIR  signal is " "present in the bilateral cerebral hemispheres, favored to  represent sequela of microvascular disease.    MRA:  1. No evidence of major vessel cutoff or significant stenosis on MRA  head.    MACRO:  None    Signed by: Lennox Tobias 3/10/2024 5:06 PM  Dictation workstation:   [id number]   BP 92/50   Pulse 76   Temp 36.7 °C (98.1 °F)   Resp 20   Ht 1.575 m (5' 2\")   Wt (!) 42.2 kg (93 lb)   BMI 17.01 kg/m²   Assessment/Plan   Problem List Items Addressed This Visit       Syncope and collapse    Current Assessment & Plan     The patient denies loss of consciousness, but did report feeling dizzy/lightheaded prior to her fall. She does follow w/ neurology and had MRI/MRA during 3/9/2024 hospitalization which showed no acute findings. There were microvascular ischemic changes in frontal lobes, right > left.         Acute renal failure (ARF) (CMS/HCC) - Primary    Current Assessment & Plan     Pt. Was re-hydrated during her 3/9/24 hospitalization and eGFR returned to prior baseline WNL. Will repeat labs next week.         Electrolyte depletion    Current Assessment & Plan     She had low Na 129, Mg 1.4, K 3.4 in the hospital, which were repleted and normalized prior to discharge. Will repeat labs next week.          Skin tear of forearm without complication, initial encounter    Current Assessment & Plan     Healing normally. Advised to let steri-strips fall off on their own. Keep area clean and dry.          Hypotension due to drugs    Current Assessment & Plan     Pt. Having frequent episodes of dizziness and falls. BP has been down to low 90s systolic. Will discontinue amlodipine. Will continue metoprolol XL 25 mg and losartan 100 mg         Pre-diabetes    Current Assessment & Plan     A1C 5.8% 3/9/2024. Discussed increased risk for DM with patient.           Other Visit Diagnoses       Vertigo        Relevant Medications    meclizine (Antivert) 12.5 mg tablet                  "

## 2024-03-14 NOTE — ASSESSMENT & PLAN NOTE
The patient denies loss of consciousness, but did report feeling dizzy/lightheaded prior to her fall. She does follow w/ neurology and had MRI/MRA during 3/9/2024 hospitalization which showed no acute findings. There were microvascular ischemic changes in frontal lobes, right > left.

## 2024-03-14 NOTE — ASSESSMENT & PLAN NOTE
Pt. Having frequent episodes of dizziness and falls. BP has been down to low 90s systolic. Will discontinue amlodipine. Will continue metoprolol XL 25 mg and losartan 100 mg

## 2024-03-18 ENCOUNTER — PATIENT OUTREACH (OUTPATIENT)
Dept: PRIMARY CARE | Facility: CLINIC | Age: 69
End: 2024-03-18
Payer: MEDICARE

## 2024-03-18 NOTE — PROGRESS NOTES
Call regarding appt. with PCP on 3/14/2024 after hospitalization.  At time of outreach call the patient feels as if their condition has improved since last visit. She is still having some pain in the left arm. She stated that she is watching it for any signs and infections. Blood pressure is still fluctuating. Keeping record of reading. Will seek medical attention if she notices any change in arm. Reviewed the PCP appointment with the pt and addressed any questions or concerns.

## 2024-03-19 LAB
ATRIAL RATE: 77 BPM
ATRIAL RATE: 94 BPM
P AXIS: 68 DEGREES
P AXIS: 76 DEGREES
P OFFSET: 207 MS
P OFFSET: 207 MS
P ONSET: 160 MS
P ONSET: 161 MS
PR INTERVAL: 126 MS
PR INTERVAL: 126 MS
Q ONSET: 223 MS
Q ONSET: 224 MS
QRS COUNT: 13 BEATS
QRS COUNT: 15 BEATS
QRS DURATION: 70 MS
QRS DURATION: 72 MS
QT INTERVAL: 332 MS
QT INTERVAL: 348 MS
QTC CALCULATION(BAZETT): 393 MS
QTC CALCULATION(BAZETT): 415 MS
QTC FREDERICIA: 378 MS
QTC FREDERICIA: 385 MS
R AXIS: 57 DEGREES
R AXIS: 69 DEGREES
T AXIS: 104 DEGREES
T AXIS: 74 DEGREES
T OFFSET: 389 MS
T OFFSET: 398 MS
VENTRICULAR RATE: 77 BPM
VENTRICULAR RATE: 94 BPM

## 2024-04-10 ENCOUNTER — DOCUMENTATION (OUTPATIENT)
Dept: RHEUMATOLOGY | Facility: CLINIC | Age: 69
End: 2024-04-10

## 2024-04-10 ENCOUNTER — HOSPITAL ENCOUNTER (OUTPATIENT)
Dept: RADIOLOGY | Facility: CLINIC | Age: 69
Discharge: HOME | End: 2024-04-10
Payer: MEDICARE

## 2024-04-10 ENCOUNTER — LAB (OUTPATIENT)
Dept: LAB | Facility: LAB | Age: 69
End: 2024-04-10
Payer: MEDICARE

## 2024-04-10 VITALS — HEIGHT: 62 IN | WEIGHT: 87 LBS | BODY MASS INDEX: 16.01 KG/M2

## 2024-04-10 DIAGNOSIS — M05.742 RHEUMATOID ARTHRITIS WITH RHEUMATOID FACTOR OF LEFT HAND WITHOUT ORGAN OR SYSTEMS INVOLVEMENT (MULTI): ICD-10-CM

## 2024-04-10 DIAGNOSIS — Z00.00 ROUTINE GENERAL MEDICAL EXAMINATION AT HEALTH CARE FACILITY: ICD-10-CM

## 2024-04-10 DIAGNOSIS — Z12.31 ENCOUNTER FOR SCREENING MAMMOGRAM FOR BREAST CANCER: ICD-10-CM

## 2024-04-10 DIAGNOSIS — I10 PRIMARY HYPERTENSION: ICD-10-CM

## 2024-04-10 DIAGNOSIS — M05.741 RHEUMATOID ARTHRITIS WITH RHEUMATOID FACTOR OF RIGHT HAND WITHOUT ORGAN OR SYSTEMS INVOLVEMENT (MULTI): Primary | ICD-10-CM

## 2024-04-10 DIAGNOSIS — Z13.1 DIABETES MELLITUS SCREENING: ICD-10-CM

## 2024-04-10 DIAGNOSIS — Z78.0 ASYMPTOMATIC MENOPAUSAL STATE: ICD-10-CM

## 2024-04-10 DIAGNOSIS — Z87.891 PERSONAL HISTORY OF TOBACCO USE, PRESENTING HAZARDS TO HEALTH: ICD-10-CM

## 2024-04-10 DIAGNOSIS — Z13.21 ENCOUNTER FOR VITAMIN DEFICIENCY SCREENING: ICD-10-CM

## 2024-04-10 PROBLEM — M85.89 OSTEOPENIA OF MULTIPLE SITES: Status: ACTIVE | Noted: 2024-04-10

## 2024-04-10 PROBLEM — E87.1 HYPONATREMIA: Status: ACTIVE | Noted: 2024-03-10

## 2024-04-10 PROBLEM — E83.42 HYPOMAGNESEMIA: Status: ACTIVE | Noted: 2024-04-10

## 2024-04-10 LAB
ALBUMIN SERPL BCP-MCNC: 4.8 G/DL (ref 3.4–5)
ALP SERPL-CCNC: 85 U/L (ref 33–136)
ALT SERPL W P-5'-P-CCNC: 19 U/L (ref 7–45)
ANION GAP SERPL CALC-SCNC: 17 MMOL/L (ref 10–20)
AST SERPL W P-5'-P-CCNC: 29 U/L (ref 9–39)
BILIRUB SERPL-MCNC: 0.8 MG/DL (ref 0–1.2)
BUN SERPL-MCNC: 28 MG/DL (ref 6–23)
CALCIUM SERPL-MCNC: 10 MG/DL (ref 8.6–10.3)
CCP IGG SERPL-ACNC: <1 U/ML
CHLORIDE SERPL-SCNC: 102 MMOL/L (ref 98–107)
CHOLEST SERPL-MCNC: 306 MG/DL (ref 0–199)
CHOLESTEROL/HDL RATIO: 3
CO2 SERPL-SCNC: 21 MMOL/L (ref 21–32)
CREAT SERPL-MCNC: 0.96 MG/DL (ref 0.5–1.05)
CRP SERPL-MCNC: <0.1 MG/DL
EGFRCR SERPLBLD CKD-EPI 2021: 65 ML/MIN/1.73M*2
ERYTHROCYTE [DISTWIDTH] IN BLOOD BY AUTOMATED COUNT: 13.2 % (ref 11.5–14.5)
GLUCOSE SERPL-MCNC: 94 MG/DL (ref 74–99)
HCT VFR BLD AUTO: 37.7 % (ref 36–46)
HDLC SERPL-MCNC: 103.5 MG/DL
HGB BLD-MCNC: 12.5 G/DL (ref 12–16)
LDLC SERPL CALC-MCNC: 166 MG/DL
MCH RBC QN AUTO: 32.6 PG (ref 26–34)
MCHC RBC AUTO-ENTMCNC: 33.2 G/DL (ref 32–36)
MCV RBC AUTO: 98 FL (ref 80–100)
NON HDL CHOLESTEROL: 203 MG/DL (ref 0–149)
NRBC BLD-RTO: 0 /100 WBCS (ref 0–0)
PLATELET # BLD AUTO: 251 X10*3/UL (ref 150–450)
POTASSIUM SERPL-SCNC: 4.3 MMOL/L (ref 3.5–5.3)
PROT SERPL-MCNC: 7 G/DL (ref 6.4–8.2)
RBC # BLD AUTO: 3.83 X10*6/UL (ref 4–5.2)
SODIUM SERPL-SCNC: 136 MMOL/L (ref 136–145)
TRIGL SERPL-MCNC: 184 MG/DL (ref 0–149)
TSH SERPL-ACNC: 2.77 MIU/L (ref 0.44–3.98)
VIT B12 SERPL-MCNC: 142 PG/ML (ref 211–911)
VLDL: 37 MG/DL (ref 0–40)
WBC # BLD AUTO: 7.1 X10*3/UL (ref 4.4–11.3)

## 2024-04-10 PROCEDURE — 84443 ASSAY THYROID STIM HORMONE: CPT

## 2024-04-10 PROCEDURE — 80061 LIPID PANEL: CPT

## 2024-04-10 PROCEDURE — 80053 COMPREHEN METABOLIC PANEL: CPT

## 2024-04-10 PROCEDURE — 77067 SCR MAMMO BI INCL CAD: CPT | Performed by: RADIOLOGY

## 2024-04-10 PROCEDURE — 77067 SCR MAMMO BI INCL CAD: CPT

## 2024-04-10 PROCEDURE — 82607 VITAMIN B-12: CPT

## 2024-04-10 PROCEDURE — 36415 COLL VENOUS BLD VENIPUNCTURE: CPT

## 2024-04-10 PROCEDURE — 71271 CT THORAX LUNG CANCER SCR C-: CPT

## 2024-04-10 PROCEDURE — 86200 CCP ANTIBODY: CPT

## 2024-04-10 PROCEDURE — 77080 DXA BONE DENSITY AXIAL: CPT

## 2024-04-10 PROCEDURE — 77063 BREAST TOMOSYNTHESIS BI: CPT | Performed by: RADIOLOGY

## 2024-04-10 PROCEDURE — 71271 CT THORAX LUNG CANCER SCR C-: CPT | Performed by: RADIOLOGY

## 2024-04-10 PROCEDURE — 86140 C-REACTIVE PROTEIN: CPT

## 2024-04-10 PROCEDURE — 85027 COMPLETE CBC AUTOMATED: CPT

## 2024-04-10 PROCEDURE — 82652 VIT D 1 25-DIHYDROXY: CPT

## 2024-04-10 NOTE — PROGRESS NOTES
Laboratory (4/10/2024) CRP <0.10, CCP <1, TSH 2.77, vitamin B12 142,Glucose 94, sodium 136, potassium 4.3, chloride 102, bicarbonate 21, BUN 28, creatinine 0.96, calcium 10.0, albumin 4.8, alkaline phosphatase 85, AST 29, ALT 19, WBC 7.1, hemoglobin 12.5, hematocrit 37.7, MCV 98, MCHC 33.2, platelets 251.    MRI brain (3/10/2024) Moderate brain parenchymal volume loss without abnormal ventricular dilatation.  Several punctate foci of hyperintense FLAIR and T2 signal in the periventricular and subcortical white matter bilateral frontal lobes, right more than left.  MRA head (3/10/2024) no evidence of arterial occlusion in the intracranial carotid arteries, anterior cerebral arteries, middle cerebral arteries, intracranial vertebral arteries or basilar artery.  CT scan cervical spine (3/9/2024) Grade 1 anterior listhesis of C3 on C4 and C4 on C5.  Multilevel disc space narrowing most prominent at C5/C6 and C6/C7.  Multilevel degenerative changes with up to mild spinal canal stenosis and moderate neuroforaminal narrowing most prominent at C5/C6.  No acute fracture.  Head CT scan (3/9/2024) no acute intracranial hemorrhage mass effect or acute infarct.  There is chronic microvascular ischemic and involutional changes.  DEXA bone density (4/10/2024) L1-L4 T-score -1.5, left total femur T-score -2.2, left femoral neck T-score -2.0.  2D echocardiogram (3/11/2024) left ventricular ejection fraction 60%.  There is trace mitral valve and tricuspid valve regurgitation.  The right ventricular systolic pressure is 17.6 mmHg.  There is no pericardial effusion.    She has been following with neurology because of recurrent syncope and has lightheadedness with standing.  She has a history of slow heartbeats when sleeping at night.  She stopped taking some of the antihypertensive medications namely metoprolol because of the low blood pressure and slow heart rate.  She discontinued the hydroxychloroquine.  She had been taking a  vitamin B-12 supplement.  The vitamin B12 supplement was discontinued when her vitamin B12 level returned within normal range and the anemia was corrected.  She noted diarrhea with taking the vitamin B12 supplement.  She has some of the vitamin B12 (3000 micrograms) supplement leftover.  The vitamin B12 level is subtherapeutic again.  She is to resume the vitamin B12 supplement 3 days/week.  She is to continue with her previously scheduled follow-up appointment.  She may need to consider cardiology evaluation of recurrent syncope after neurology evaluation is completed.

## 2024-04-12 ENCOUNTER — HOSPITAL ENCOUNTER (OUTPATIENT)
Dept: RADIOLOGY | Facility: EXTERNAL LOCATION | Age: 69
Discharge: HOME | End: 2024-04-12

## 2024-04-12 LAB — 1,25(OH)2D3 SERPL-MCNC: 44.2 PG/ML (ref 19.9–79.3)

## 2024-04-13 DIAGNOSIS — E53.8 VITAMIN B12 DEFICIENCY: Primary | ICD-10-CM

## 2024-04-13 PROBLEM — R91.8 PULMONARY NODULES: Status: ACTIVE | Noted: 2024-04-13

## 2024-04-13 PROBLEM — J43.2 CENTRILOBULAR EMPHYSEMA (MULTI): Status: ACTIVE | Noted: 2024-04-13

## 2024-04-13 RX ORDER — CYANOCOBALAMIN 1000 UG/ML
1000 INJECTION, SOLUTION INTRAMUSCULAR; SUBCUTANEOUS
Status: SHIPPED | OUTPATIENT
Start: 2024-04-14 | End: 2024-08-12

## 2024-04-13 NOTE — RESULT ENCOUNTER NOTE
Please advise pt. her vitamin B12 level is low. She will need to come in for monthly B12 injections x4 months and then repeat a level. She also appears to be dehydrated on her labs. She should drink at least 64 oz of water or nutritional supplement daily. Caffeinated beverages don't count. Once she is properly hydrated, we'll likely see evidence of her anemia again. Please remind her to send in the Cologuard so we can screen her for colon cancer. She should have received the kit last month, but let me know if she needs another one sent out.

## 2024-04-15 DIAGNOSIS — I10 PRIMARY HYPERTENSION: ICD-10-CM

## 2024-04-15 NOTE — TELEPHONE ENCOUNTER
PT needs refill for...      losartan (Cozaar) 100 mg tablet     PT is requesting 90 day supply please    Please send to...    [facility]    Thank you

## 2024-04-16 RX ORDER — LOSARTAN POTASSIUM 100 MG/1
100 TABLET ORAL DAILY
Qty: 90 TABLET | Refills: 3 | Status: SHIPPED | OUTPATIENT
Start: 2024-04-16

## 2024-04-18 ENCOUNTER — PATIENT OUTREACH (OUTPATIENT)
Dept: PRIMARY CARE | Facility: CLINIC | Age: 69
End: 2024-04-18
Payer: MEDICARE

## 2024-04-18 NOTE — PROGRESS NOTES
Successful outreach to patient regarding hospitalization as patient continues TCM program.   At time of outreach call the patient feels as if their condition has returned to baseline since initial visit with PCP or specialist.  Questions or concerns addressed at this time with patient.   Provided contact information to patient if any further non-emergent needs arise.

## 2024-04-24 LAB — NONINV COLON CA DNA+OCC BLD SCRN STL QL: NEGATIVE

## 2024-05-15 ENCOUNTER — OFFICE VISIT (OUTPATIENT)
Dept: RHEUMATOLOGY | Facility: CLINIC | Age: 69
End: 2024-05-15
Payer: MEDICARE

## 2024-05-15 VITALS
BODY MASS INDEX: 16.38 KG/M2 | HEART RATE: 94 BPM | WEIGHT: 89 LBS | DIASTOLIC BLOOD PRESSURE: 70 MMHG | SYSTOLIC BLOOD PRESSURE: 128 MMHG | TEMPERATURE: 97.3 F | HEIGHT: 62 IN

## 2024-05-15 DIAGNOSIS — M05.741 RHEUMATOID ARTHRITIS INVOLVING BOTH HANDS WITH POSITIVE RHEUMATOID FACTOR (MULTI): Primary | ICD-10-CM

## 2024-05-15 DIAGNOSIS — M05.742 RHEUMATOID ARTHRITIS INVOLVING BOTH HANDS WITH POSITIVE RHEUMATOID FACTOR (MULTI): Primary | ICD-10-CM

## 2024-05-15 DIAGNOSIS — M81.0 OSTEOPOROSIS WITHOUT CURRENT PATHOLOGICAL FRACTURE, UNSPECIFIED OSTEOPOROSIS TYPE: ICD-10-CM

## 2024-05-15 PROCEDURE — 99213 OFFICE O/P EST LOW 20 MIN: CPT | Performed by: INTERNAL MEDICINE

## 2024-05-15 PROCEDURE — 1159F MED LIST DOCD IN RCRD: CPT | Performed by: INTERNAL MEDICINE

## 2024-05-15 PROCEDURE — 1125F AMNT PAIN NOTED PAIN PRSNT: CPT | Performed by: INTERNAL MEDICINE

## 2024-05-15 ASSESSMENT — PAIN SCALES - GENERAL: PAINLEVEL: 3

## 2024-05-15 NOTE — PROGRESS NOTES
She was recently hospitalized because of syncopal episode in which she struck the front and the back of her head as well as left forearm.  She had a second syncopal episode after she was picked up from the floor by her .  She has some mild pain in the right hip and both knees.  She has not noted any joint swelling.  She has history of rib fractures, and fracture of her feet.  She has been taking vitamin B 12 supplement alternating vitamin D supplement.  She alternates the vitamins because she had diarrhea when she took the vitamin D and vitamin B12 together.    She has a thin body habitus.  The lungs, heart, abdomen, and extremities are benign.  The musculoskeletal examination does not show any joint effusions.  There is preserved passive range of motion of the upper and lower extremity joints.    Laboratory (4/10/2024) CCP <1 CRP <0.10, TSH 2.77, BUN 28, creatinine 0.96, glucose 94, calcium 10.0, albumin 4.8, 84 AST 29, ALT 19, WBC 7.1, hemoglobin 12.5, hematocrit 37.7, MCV 98, MCHC 33.2, platelets 251, vitamin B12 142, 1,25 dihydroxy vitamin D 44.2    DEXA bone density (4/10/2024) L1-L4 T-score -1.5, left total femur T-score -2.2, left femoral neck T-score -2.0.    She has vitamin B12 deficiency on vitamin B12 supplementation, rheumatoid arthritis, asthma, osteoarthritis of the cervical and lumbar spines, osteopenia.    She does not want to consider taking any pharmacologic antiresorptive therapy at this time.  She continues to follow-up with neurology regarding the recurrent syncope.  She has remained off of hydroxychloroquine.  She is to have laboratory for C telopeptide prior to the next office visit to assess for excessive bone turnover.  She is to return at the next available office appointment.

## 2024-05-17 ENCOUNTER — PATIENT OUTREACH (OUTPATIENT)
Dept: PRIMARY CARE | Facility: CLINIC | Age: 69
End: 2024-05-17
Payer: MEDICARE

## 2024-07-01 DIAGNOSIS — J43.2 CENTRILOBULAR EMPHYSEMA (MULTI): Primary | ICD-10-CM

## 2024-07-01 RX ORDER — ALBUTEROL SULFATE 90 UG/1
2 AEROSOL, METERED RESPIRATORY (INHALATION) EVERY 6 HOURS PRN
Qty: 18 G | Refills: 2 | Status: SHIPPED | OUTPATIENT
Start: 2024-07-01

## 2024-07-01 RX ORDER — ALBUTEROL SULFATE 90 UG/1
2 AEROSOL, METERED RESPIRATORY (INHALATION) EVERY 6 HOURS PRN
COMMUNITY
End: 2024-07-01 | Stop reason: SDUPTHER

## 2024-07-01 NOTE — TELEPHONE ENCOUNTER
Patient requests prescription below    Last Office Visit: 3/14/2024   Next Office Visit: 9/23/2024     Requested Prescriptions     Pending Prescriptions Disp Refills    albuterol (ProAir HFA) 90 mcg/actuation inhaler 18 g 2     Sig: Inhale 2 puffs every 6 hours if needed for wheezing.

## 2024-07-01 NOTE — TELEPHONE ENCOUNTER
PT needs refill for...    Pro Air HFA 90 mcg inhaler    Please send to...    [facility]

## 2024-08-01 ENCOUNTER — APPOINTMENT (OUTPATIENT)
Dept: CARDIOLOGY | Facility: CLINIC | Age: 69
End: 2024-08-01
Payer: MEDICARE

## 2024-08-01 VITALS
HEIGHT: 62 IN | WEIGHT: 92 LBS | SYSTOLIC BLOOD PRESSURE: 160 MMHG | DIASTOLIC BLOOD PRESSURE: 82 MMHG | OXYGEN SATURATION: 99 % | BODY MASS INDEX: 16.93 KG/M2 | HEART RATE: 120 BPM

## 2024-08-01 DIAGNOSIS — R73.03 PRE-DIABETES: ICD-10-CM

## 2024-08-01 DIAGNOSIS — M06.9 RHEUMATOID ARTHRITIS, INVOLVING UNSPECIFIED SITE, UNSPECIFIED WHETHER RHEUMATOID FACTOR PRESENT (MULTI): ICD-10-CM

## 2024-08-01 DIAGNOSIS — Z88.0 ALLERGY TO PENICILLIN: ICD-10-CM

## 2024-08-01 DIAGNOSIS — F41.9 ANXIETY: ICD-10-CM

## 2024-08-01 DIAGNOSIS — R94.31 ABNORMAL EKG: ICD-10-CM

## 2024-08-01 DIAGNOSIS — R00.0 SINUS TACHYCARDIA: ICD-10-CM

## 2024-08-01 DIAGNOSIS — E78.2 MIXED HYPERLIPIDEMIA: ICD-10-CM

## 2024-08-01 DIAGNOSIS — I10 ESSENTIAL HYPERTENSION: ICD-10-CM

## 2024-08-01 DIAGNOSIS — K21.9 GASTROESOPHAGEAL REFLUX DISEASE WITHOUT ESOPHAGITIS: ICD-10-CM

## 2024-08-01 DIAGNOSIS — M81.0 OSTEOPOROSIS, UNSPECIFIED OSTEOPOROSIS TYPE, UNSPECIFIED PATHOLOGICAL FRACTURE PRESENCE: ICD-10-CM

## 2024-08-01 DIAGNOSIS — R55 SYNCOPE AND COLLAPSE: Primary | ICD-10-CM

## 2024-08-01 DIAGNOSIS — Z82.49 FAMILY HISTORY OF ARRHYTHMIA: ICD-10-CM

## 2024-08-01 DIAGNOSIS — Z72.0 TOBACCO ABUSE: ICD-10-CM

## 2024-08-01 DIAGNOSIS — G43.909 MIGRAINE WITHOUT STATUS MIGRAINOSUS, NOT INTRACTABLE, UNSPECIFIED MIGRAINE TYPE: ICD-10-CM

## 2024-08-01 DIAGNOSIS — R42 LIGHTHEADEDNESS: ICD-10-CM
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
## 2024-08-01 DIAGNOSIS — Z82.49 FAMILY HISTORY OF EARLY CAD: ICD-10-CM

## 2024-08-01 DIAGNOSIS — Z90.49 S/P APPY: ICD-10-CM
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
## 2024-08-01 PROCEDURE — 99205 OFFICE O/P NEW HI 60 MIN: CPT | Performed by: INTERNAL MEDICINE

## 2024-08-01 PROCEDURE — 1159F MED LIST DOCD IN RCRD: CPT | Performed by: INTERNAL MEDICINE

## 2024-08-01 PROCEDURE — 3008F BODY MASS INDEX DOCD: CPT | Performed by: INTERNAL MEDICINE

## 2024-08-01 PROCEDURE — 93000 ELECTROCARDIOGRAM COMPLETE: CPT | Performed by: INTERNAL MEDICINE

## 2024-08-01 PROCEDURE — 3077F SYST BP >= 140 MM HG: CPT | Performed by: INTERNAL MEDICINE

## 2024-08-01 PROCEDURE — 3079F DIAST BP 80-89 MM HG: CPT | Performed by: INTERNAL MEDICINE

## 2024-08-01 RX ORDER — METOPROLOL SUCCINATE 25 MG/1
25 TABLET, EXTENDED RELEASE ORAL DAILY
Qty: 30 TABLET | Refills: 11 | Status: SHIPPED | OUTPATIENT
Start: 2024-08-01 | End: 2025-08-01

## 2024-08-01 RX ORDER — CALCIUM CARBONATE 600 MG
1 TABLET ORAL DAILY
COMMUNITY

## 2024-08-01 RX ORDER — OMEPRAZOLE 10 MG/1
1 CAPSULE, DELAYED RELEASE ORAL DAILY
COMMUNITY
End: 2024-08-01 | Stop reason: WASHOUT

## 2024-08-01 NOTE — PROGRESS NOTES
CARDIOLOGY CONSULTATION NOTE       Patient:    Sang Joy    YOB: 1955  MRN:    [id number]    Date:   2024     Reason for Visit: New consult, syncope, falls.     IMPRESSION:      Chest pain falls  Syncope  Sinus tachycardia  Chest pain  Lightheadedness  Palpitations  Migraine headaches  Hypertension  Hyperlipidemia  LV systolic function, EF 60%, echocardiogram 3/2024  GERD   Rheumatoid arthritis  Osteopenia  Prior appendectomy  Tobacco abuse  Penicillin allergy  Family history of coronary artery disease and atrial fibrillation  Otherwise as per assessment below.    RECOMMENDATIONS:      Patient has above-noted history.  Does she has history of falls syncope and noted abnormal EKG with sinus tachycardia.  Given her history and symptoms would suggest further cardiovascular evaluation with the followin-hour Holter monitor, CT calcium scoring, tilt table study, Lexiscan perfusion stress testing at this time.  Further recommendation including possible invasive testing as warranted.    Would suggest trial of Toprol-XL 25 mg nightly.  Crestor 10 mg daily will also be added for better statin control with LDL goal of less than 70.  Refills were otherwise provided.    Exercise dietary program.  Hydration.    [employer] portal use was encouraged.    We will plan to see back following the above testing with Laboratory Studies and ECG as noted.     Patient will follow up with their primary physician for general care.    The patient knows to contact medical care earlier if need be.    HPI:     Sang Joy was seen in cardiac evaluation at the  Cardiology office 2024.      The patients problems are listed as in the impression above.    Electronic medical records reviewed.    Patient is a pleasant 68-year-old hypertensive, hyperlipidemic woman no with out prior significant cardiovascular history who has had recent falls and syncope.  She states that she has been active without symptoms  but does occasionally have dull chest discomfort and associated anxiety.  She states that she becomes lightheaded at times.  She falls as her legs become numb.  She has palpitations particularly with anxiety.  She has had no other significant cardiovascular history.  She does have migraine headaches at times last 2 weeks ago.    Patient denies SOB, TIA or CVA symptoms.  No CHF or Edema.  No GI,  or Bleeding Issues. No Recent Fever or Chills.     Cardiovascular and general review of systems is otherwise negative.    A 14-system review is otherwise negative, other than noted.    ALLERGIES:     Allergies   Allergen Reactions    Clarithromycin Shortness of breath    Garlic Diarrhea    Influenza Virus Vaccines Anaphylaxis    Iodine Hives    Penicillins Anaphylaxis    Acetaminophen Other     Cannot take while on Plaquenil    Codeine Nausea/vomiting    Tetanus Toxoid, Adsorbed Other    Amoxicillin Rash        MEDICATIONS:     Current Outpatient Medications   Medication Instructions    albuterol (ProAir HFA) 90 mcg/actuation inhaler 2 puffs, inhalation, Every 6 hours PRN    calcium carbonate 600 mg calcium (1,500 mg) tablet 1 tablet, oral, Daily    calcium-vitamin D3-vitamin K (Viactiv) 650 mg-12.5 mcg-40 mcg chewable tablet 2 tablets, oral, Daily    cholecalciferol (VITAMIN D3) 50 mcg, oral, Daily    fluticasone (Flonase Allergy Relief) 50 mcg/actuation nasal spray 1 sprays, Nasal, DAILY, in each nostril, Refill(s) 0    furosemide (LASIX) 40 mg, oral, Daily PRN    hydroxychloroquine (Plaquenil) 200 mg tablet 1 tablet, oral, Daily    loratadine 10 mg tablet,chewable oral, Every 24 hours    losartan (COZAAR) 100 mg, oral, Daily    magnesium oxide (MAG-OX) 400 mg, oral, Daily    meclizine (ANTIVERT) 12.5 mg, oral, 3 times daily PRN    multivitamin capsule 1 capsule, Daily    omega-3 (Fish Oil) 60- mg capsule 500 mg, oral, Daily, David red    omeprazole OTC (PRILOSEC OTC) 20 mg    POTASSIUM GLUCONATE ORAL 90 mg, oral,  Daily       PAST MEDICAL HISTORY:   As per impression above.  No other significant past medical or surgical history appreciated.    SOCIAL HISTORY:   .  No children.  Retired banker.  Smokes less than a pack of cigarettes per day.  15-pack-year smoking history.  Drinks 2 teresa per day.  No illicit drug use.    FAMILY HISTORY:   Positive family history of CAD and atrial fibrillation.    VITALS:     Vitals:    08/01/24 1000   BP: 160/82   Pulse: (!) 120   SpO2: 99%       Wt Readings from Last 4 Encounters:   08/01/24 (!) 41.7 kg (92 lb)   05/15/24 (!) 40.4 kg (89 lb)   04/10/24 (!) 39.5 kg (87 lb)   03/14/24 (!) 42.2 kg (93 lb)       PHYSICAL EXAMINATION:      General: No acute distress. Vital signs as noted. Alert and oriented.  Head And Neck Examination: No jugular venous distention, no carotid bruits, no mass. Carotid upstrokes preserved. Oral mucosa moist.  No xanthelasma. Head and neck examination otherwise unremarkable.  Lungs: Clear to auscultation and percussion. No wheezes, no rales,  and no rhonchi.  Chest: Excursion appeared to be normal. No chest wall tenderness on palpation.  Heart: Normal S1 and S2. No S3. No S4. No rub. Grade 1/6 systolic murmur, best heard at the left sternal border. Point of maximal impulse was within normal limits.  Abdomen: Soft. Nontender. No organomegaly. No bruits. No masses.  Obese  Extremities: No bipedal edema. No clubbing. No cyanosis.  Pulses are strong throughout. No bruits.  Musculoskeletal Exam: No ulcers, otherwise unremarkable.  Neuro: Neurologically appeared grossly intact.    ELECTROCARDIOGRAM:      Sinus tachycardia, poor R wave anterior progression, question anterior MI, nonspecific ST-T wave changes, rate 120.    CARDIAC TESTING:      Echocardiogram, 3/2024:  left ventricular ejection fraction 60%.   Trace mitral valve and tricuspid valve regurgitation.   RVSP 17.6 mmHg.   No pericardial effusion.     LABORATORY DATA:      No recent testing,      Prior as  noted below:    CBC:   Lab Results   Component Value Date    WBC 7.1 04/10/2024    RBC 3.83 (L) 04/10/2024    HGB 12.5 04/10/2024    HCT 37.7 04/10/2024     04/10/2024        CMP:    Lab Results   Component Value Date     04/10/2024    K 4.3 04/10/2024     04/10/2024    CO2 21 04/10/2024    BUN 28 (H) 04/10/2024    CREATININE 0.96 04/10/2024    GLUCOSE 94 04/10/2024    CALCIUM 10.0 04/10/2024       Magnesium:    Lab Results   Component Value Date    MG 1.74 03/11/2024       Lipid Profile:    Lab Results   Component Value Date    CHOL 306 (H) 04/10/2024    TRIG 184 (H) 04/10/2024    .5 04/10/2024       Hepatic Function Panel:    Lab Results   Component Value Date    ALKPHOS 85 04/10/2024    ALT 19 04/10/2024    AST 29 04/10/2024    PROT 7.0 04/10/2024    BILITOT 0.8 04/10/2024       TSH:    Lab Results   Component Value Date    TSH 2.77 04/10/2024       HgBA1c:    Lab Results   Component Value Date    HGBA1C 5.8 (H) 03/09/2024       BNP:   Lab Results   Component Value Date     (H) 03/09/2024        PT/INR:    Lab Results   Component Value Date    PROTIME 10.3 03/09/2024    INR 0.9 03/09/2024       Cardiac Enzymes:    Lab Results   Component Value Date    TROPHS 5 03/09/2024    TROPHS 7 01/29/2024    TROPHS 7 01/29/2024                   PROBLEM LIST:     Patient Active Problem List   Diagnosis    Syncope and collapse    Acute renal failure (ARF) (CMS-HCC)    Electrolyte depletion    Acute on chronic anemia    Skin tear of forearm without complication, initial encounter    Hypotension due to drugs    Pre-diabetes    Hyponatremia    Hypomagnesemia    Osteopenia of multiple sites    Centrilobular emphysema (Multi)    Pulmonary nodules             Titi Monroy MD, FACC   [facility] / NO /  Cardiology      Of Note:  [employer] voice recognition dictation software was utilized partially in the preparation of this note, therefore, inaccuracies in spelling, word choice and punctuation may  have occurred which were not recognized the time of signing.    Patient was seen and examined with total time of visit including chart preparation, rooming, and chart completion exceeding 40 minutes.    ----

## 2024-08-01 NOTE — PATIENT INSTRUCTIONS
Exercise diet weight loss program.    Hydrate    Use My Chart portal for reviewing records, testing and contacting office.     Try Toprol XL 25 mg nightly.  May start half a pill per day and increase as tolerated.  If blood pressures fall below 110 over the symptoms of lightheadedness, continue Toprol and decrease losartan to 50 mg daily.  Call me if questions.    Hold your Toprol 2 days before your Holter monitor and tilt table study, resume after as tolerated.

## 2024-08-04 ENCOUNTER — HOSPITAL ENCOUNTER (OUTPATIENT)
Dept: RADIOLOGY | Facility: HOSPITAL | Age: 69
Discharge: HOME | End: 2024-08-04
Payer: MEDICARE

## 2024-08-04 DIAGNOSIS — I10 ESSENTIAL HYPERTENSION: ICD-10-CM

## 2024-08-04 DIAGNOSIS — Z88.0 ALLERGY TO PENICILLIN: ICD-10-CM

## 2024-08-04 DIAGNOSIS — G43.909 MIGRAINE WITHOUT STATUS MIGRAINOSUS, NOT INTRACTABLE, UNSPECIFIED MIGRAINE TYPE: ICD-10-CM

## 2024-08-04 DIAGNOSIS — M81.0 OSTEOPOROSIS, UNSPECIFIED OSTEOPOROSIS TYPE, UNSPECIFIED PATHOLOGICAL FRACTURE PRESENCE: ICD-10-CM
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
## 2024-08-04 DIAGNOSIS — R73.03 PRE-DIABETES: ICD-10-CM

## 2024-08-04 DIAGNOSIS — R55 SYNCOPE AND COLLAPSE: ICD-10-CM

## 2024-08-04 DIAGNOSIS — Z90.49 S/P APPY: ICD-10-CM

## 2024-08-04 DIAGNOSIS — F41.9 ANXIETY: ICD-10-CM

## 2024-08-04 DIAGNOSIS — K21.9 GASTROESOPHAGEAL REFLUX DISEASE WITHOUT ESOPHAGITIS: ICD-10-CM

## 2024-08-04 DIAGNOSIS — Z82.49 FAMILY HISTORY OF EARLY CAD: ICD-10-CM

## 2024-08-04 DIAGNOSIS — R00.0 SINUS TACHYCARDIA: ICD-10-CM

## 2024-08-04 DIAGNOSIS — R94.31 ABNORMAL EKG: ICD-10-CM

## 2024-08-04 DIAGNOSIS — E78.2 MIXED HYPERLIPIDEMIA: ICD-10-CM

## 2024-08-04 DIAGNOSIS — M06.9 RHEUMATOID ARTHRITIS, INVOLVING UNSPECIFIED SITE, UNSPECIFIED WHETHER RHEUMATOID FACTOR PRESENT (MULTI): ICD-10-CM

## 2024-08-04 DIAGNOSIS — Z82.49 FAMILY HISTORY OF ARRHYTHMIA: ICD-10-CM

## 2024-08-04 DIAGNOSIS — R42 LIGHTHEADEDNESS: ICD-10-CM

## 2024-08-04 DIAGNOSIS — Z72.0 TOBACCO ABUSE: ICD-10-CM

## 2024-08-04 PROCEDURE — 75571 CT HRT W/O DYE W/CA TEST: CPT

## 2024-08-09 ENCOUNTER — APPOINTMENT (OUTPATIENT)
Dept: CARDIOLOGY | Facility: CLINIC | Age: 69
End: 2024-08-09
Payer: MEDICARE

## 2024-08-09 ENCOUNTER — APPOINTMENT (OUTPATIENT)
Dept: RADIOLOGY | Facility: CLINIC | Age: 69
End: 2024-08-09
Payer: MEDICARE

## 2024-08-15 ENCOUNTER — APPOINTMENT (OUTPATIENT)
Dept: CARDIOLOGY | Facility: CLINIC | Age: 69
End: 2024-08-15
Payer: MEDICARE

## 2024-08-28 ENCOUNTER — LAB (OUTPATIENT)
Dept: LAB | Facility: LAB | Age: 69
End: 2024-08-28
Payer: MEDICARE

## 2024-08-28 DIAGNOSIS — R55 SYNCOPE AND COLLAPSE: ICD-10-CM

## 2024-08-28 DIAGNOSIS — M06.9 RHEUMATOID ARTHRITIS, INVOLVING UNSPECIFIED SITE, UNSPECIFIED WHETHER RHEUMATOID FACTOR PRESENT (MULTI): ICD-10-CM
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
## 2024-08-28 DIAGNOSIS — R00.0 SINUS TACHYCARDIA: ICD-10-CM

## 2024-08-28 DIAGNOSIS — Z82.49 FAMILY HISTORY OF EARLY CAD: ICD-10-CM

## 2024-08-28 DIAGNOSIS — K21.9 GASTROESOPHAGEAL REFLUX DISEASE WITHOUT ESOPHAGITIS: ICD-10-CM

## 2024-08-28 DIAGNOSIS — R73.03 PRE-DIABETES: ICD-10-CM

## 2024-08-28 DIAGNOSIS — Z88.0 ALLERGY TO PENICILLIN: ICD-10-CM

## 2024-08-28 DIAGNOSIS — E78.2 MIXED HYPERLIPIDEMIA: ICD-10-CM

## 2024-08-28 DIAGNOSIS — E53.8 VITAMIN B12 DEFICIENCY: ICD-10-CM

## 2024-08-28 DIAGNOSIS — M81.0 OSTEOPOROSIS, UNSPECIFIED OSTEOPOROSIS TYPE, UNSPECIFIED PATHOLOGICAL FRACTURE PRESENCE: ICD-10-CM

## 2024-08-28 DIAGNOSIS — F41.9 ANXIETY: ICD-10-CM

## 2024-08-28 DIAGNOSIS — Z72.0 TOBACCO ABUSE: ICD-10-CM

## 2024-08-28 DIAGNOSIS — G43.909 MIGRAINE WITHOUT STATUS MIGRAINOSUS, NOT INTRACTABLE, UNSPECIFIED MIGRAINE TYPE: ICD-10-CM

## 2024-08-28 DIAGNOSIS — R94.31 ABNORMAL EKG: ICD-10-CM

## 2024-08-28 DIAGNOSIS — Z82.49 FAMILY HISTORY OF ARRHYTHMIA: ICD-10-CM

## 2024-08-28 DIAGNOSIS — I10 ESSENTIAL HYPERTENSION: ICD-10-CM

## 2024-08-28 DIAGNOSIS — R42 LIGHTHEADEDNESS: ICD-10-CM

## 2024-08-28 DIAGNOSIS — Z90.49 S/P APPY: ICD-10-CM

## 2024-08-28 LAB
ALBUMIN SERPL BCP-MCNC: 4.3 G/DL (ref 3.4–5)
ALP SERPL-CCNC: 90 U/L (ref 33–136)
ALT SERPL W P-5'-P-CCNC: 21 U/L (ref 7–45)
ANION GAP SERPL CALC-SCNC: 18 MMOL/L (ref 10–20)
AST SERPL W P-5'-P-CCNC: 33 U/L (ref 9–39)
BILIRUB DIRECT SERPL-MCNC: 0.2 MG/DL (ref 0–0.3)
BILIRUB SERPL-MCNC: 0.7 MG/DL (ref 0–1.2)
BUN SERPL-MCNC: 16 MG/DL (ref 6–23)
CALCIUM SERPL-MCNC: 9.8 MG/DL (ref 8.6–10.3)
CHLORIDE SERPL-SCNC: 99 MMOL/L (ref 98–107)
CHOLEST SERPL-MCNC: 216 MG/DL (ref 0–199)
CHOLESTEROL/HDL RATIO: 2.1
CO2 SERPL-SCNC: 23 MMOL/L (ref 21–32)
CREAT SERPL-MCNC: 1 MG/DL (ref 0.5–1.05)
EGFRCR SERPLBLD CKD-EPI 2021: 62 ML/MIN/1.73M*2
ERYTHROCYTE [DISTWIDTH] IN BLOOD BY AUTOMATED COUNT: 13.2 % (ref 11.5–14.5)
GLUCOSE SERPL-MCNC: 90 MG/DL (ref 74–99)
HCT VFR BLD AUTO: 35.6 % (ref 36–46)
HDLC SERPL-MCNC: 100.5 MG/DL
HGB BLD-MCNC: 12.1 G/DL (ref 12–16)
LDLC SERPL CALC-MCNC: 94 MG/DL
MAGNESIUM SERPL-MCNC: 1.5 MG/DL (ref 1.6–2.4)
MCH RBC QN AUTO: 32.6 PG (ref 26–34)
MCHC RBC AUTO-ENTMCNC: 34 G/DL (ref 32–36)
MCV RBC AUTO: 96 FL (ref 80–100)
NON HDL CHOLESTEROL: 116 MG/DL (ref 0–149)
NRBC BLD-RTO: 0 /100 WBCS (ref 0–0)
PLATELET # BLD AUTO: 231 X10*3/UL (ref 150–450)
POTASSIUM SERPL-SCNC: 4.8 MMOL/L (ref 3.5–5.3)
PROT SERPL-MCNC: 6.3 G/DL (ref 6.4–8.2)
RBC # BLD AUTO: 3.71 X10*6/UL (ref 4–5.2)
SODIUM SERPL-SCNC: 135 MMOL/L (ref 136–145)
TRIGL SERPL-MCNC: 109 MG/DL (ref 0–149)
TSH SERPL-ACNC: 1.34 MIU/L (ref 0.44–3.98)
VIT B12 SERPL-MCNC: 755 PG/ML (ref 211–911)
VLDL: 22 MG/DL (ref 0–40)
WBC # BLD AUTO: 10.9 X10*3/UL (ref 4.4–11.3)

## 2024-08-28 PROCEDURE — 85027 COMPLETE CBC AUTOMATED: CPT

## 2024-08-28 PROCEDURE — 80061 LIPID PANEL: CPT

## 2024-08-28 PROCEDURE — 84443 ASSAY THYROID STIM HORMONE: CPT

## 2024-08-28 PROCEDURE — 82607 VITAMIN B-12: CPT

## 2024-08-28 PROCEDURE — 82248 BILIRUBIN DIRECT: CPT

## 2024-08-28 PROCEDURE — 36415 COLL VENOUS BLD VENIPUNCTURE: CPT

## 2024-08-28 PROCEDURE — 80053 COMPREHEN METABOLIC PANEL: CPT

## 2024-08-28 PROCEDURE — 83735 ASSAY OF MAGNESIUM: CPT

## 2024-08-30 ENCOUNTER — HOSPITAL ENCOUNTER (OUTPATIENT)
Dept: RADIOLOGY | Facility: CLINIC | Age: 69
Discharge: HOME | End: 2024-08-30
Payer: MEDICARE

## 2024-08-30 ENCOUNTER — APPOINTMENT (OUTPATIENT)
Dept: CARDIOLOGY | Facility: CLINIC | Age: 69
End: 2024-08-30
Payer: MEDICARE

## 2024-08-30 ENCOUNTER — HOSPITAL ENCOUNTER (OUTPATIENT)
Dept: CARDIOLOGY | Facility: CLINIC | Age: 69
Discharge: HOME | End: 2024-08-30
Payer: MEDICARE

## 2024-08-30 DIAGNOSIS — E78.2 MIXED HYPERLIPIDEMIA: ICD-10-CM

## 2024-08-30 DIAGNOSIS — R55 SYNCOPE AND COLLAPSE: ICD-10-CM

## 2024-08-30 DIAGNOSIS — Z82.49 FAMILY HISTORY OF EARLY CAD: ICD-10-CM

## 2024-08-30 DIAGNOSIS — M06.9 RHEUMATOID ARTHRITIS, INVOLVING UNSPECIFIED SITE, UNSPECIFIED WHETHER RHEUMATOID FACTOR PRESENT (MULTI): ICD-10-CM

## 2024-08-30 DIAGNOSIS — G43.909 MIGRAINE WITHOUT STATUS MIGRAINOSUS, NOT INTRACTABLE, UNSPECIFIED MIGRAINE TYPE: ICD-10-CM
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
## 2024-08-30 DIAGNOSIS — I10 ESSENTIAL HYPERTENSION: ICD-10-CM

## 2024-08-30 DIAGNOSIS — R00.0 SINUS TACHYCARDIA: ICD-10-CM

## 2024-08-30 DIAGNOSIS — Z72.0 TOBACCO ABUSE: ICD-10-CM

## 2024-08-30 DIAGNOSIS — Z88.0 ALLERGY TO PENICILLIN: ICD-10-CM

## 2024-08-30 DIAGNOSIS — R42 LIGHTHEADEDNESS: ICD-10-CM

## 2024-08-30 DIAGNOSIS — Z82.49 FAMILY HISTORY OF ARRHYTHMIA: ICD-10-CM

## 2024-08-30 DIAGNOSIS — R94.31 ABNORMAL EKG: ICD-10-CM

## 2024-08-30 DIAGNOSIS — R94.31 ABNORMAL EKG: Primary | ICD-10-CM

## 2024-08-30 DIAGNOSIS — Z90.49 S/P APPY: ICD-10-CM

## 2024-08-30 DIAGNOSIS — K21.9 GASTROESOPHAGEAL REFLUX DISEASE WITHOUT ESOPHAGITIS: ICD-10-CM

## 2024-08-30 DIAGNOSIS — F41.9 ANXIETY: ICD-10-CM

## 2024-08-30 DIAGNOSIS — R73.03 PRE-DIABETES: ICD-10-CM

## 2024-08-30 DIAGNOSIS — M81.0 OSTEOPOROSIS, UNSPECIFIED OSTEOPOROSIS TYPE, UNSPECIFIED PATHOLOGICAL FRACTURE PRESENCE: ICD-10-CM

## 2024-08-30 PROCEDURE — 2500000004 HC RX 250 GENERAL PHARMACY W/ HCPCS (ALT 636 FOR OP/ED): Performed by: INTERNAL MEDICINE

## 2024-08-30 PROCEDURE — 93017 CV STRESS TEST TRACING ONLY: CPT

## 2024-08-30 PROCEDURE — 93227 XTRNL ECG REC<48 HR R&I: CPT | Performed by: INTERNAL MEDICINE

## 2024-08-30 PROCEDURE — 3430000001 HC RX 343 DIAGNOSTIC RADIOPHARMACEUTICALS: Performed by: INTERNAL MEDICINE

## 2024-08-30 PROCEDURE — 78452 HT MUSCLE IMAGE SPECT MULT: CPT

## 2024-08-30 PROCEDURE — 93225 XTRNL ECG REC<48 HRS REC: CPT | Performed by: INTERNAL MEDICINE

## 2024-08-30 PROCEDURE — A9502 TC99M TETROFOSMIN: HCPCS | Performed by: INTERNAL MEDICINE

## 2024-08-30 RX ORDER — REGADENOSON 0.08 MG/ML
0.4 INJECTION, SOLUTION INTRAVENOUS ONCE
Status: COMPLETED | OUTPATIENT
Start: 2024-08-30 | End: 2024-08-30

## 2024-08-30 RX ORDER — AMINOPHYLLINE 25 MG/ML
50 INJECTION, SOLUTION INTRAVENOUS ONCE
Status: COMPLETED | OUTPATIENT
Start: 2024-08-30 | End: 2024-08-30

## 2024-09-03 ENCOUNTER — APPOINTMENT (OUTPATIENT)
Dept: CARDIOLOGY | Facility: CLINIC | Age: 69
End: 2024-09-03
Payer: MEDICARE

## 2024-09-05 ENCOUNTER — APPOINTMENT (OUTPATIENT)
Dept: CARDIOLOGY | Facility: CLINIC | Age: 69
End: 2024-09-05
Payer: MEDICARE

## 2024-09-05 VITALS
WEIGHT: 92.4 LBS | HEIGHT: 62 IN | DIASTOLIC BLOOD PRESSURE: 78 MMHG | SYSTOLIC BLOOD PRESSURE: 162 MMHG | HEART RATE: 83 BPM | BODY MASS INDEX: 17 KG/M2 | OXYGEN SATURATION: 100 %

## 2024-09-05 DIAGNOSIS — R94.30 ABNORMAL CARDIAC FUNCTION TEST: ICD-10-CM

## 2024-09-05 DIAGNOSIS — I10 ESSENTIAL HYPERTENSION: ICD-10-CM

## 2024-09-05 DIAGNOSIS — E78.2 MIXED HYPERLIPIDEMIA: ICD-10-CM

## 2024-09-05 DIAGNOSIS — I47.19 PAT (PAROXYSMAL ATRIAL TACHYCARDIA) (CMS-HCC): ICD-10-CM

## 2024-09-05 DIAGNOSIS — R42 LIGHTHEADEDNESS: ICD-10-CM

## 2024-09-05 DIAGNOSIS — R00.2 PALPITATIONS: Primary | ICD-10-CM

## 2024-09-05 DIAGNOSIS — G43.909 MIGRAINE WITHOUT STATUS MIGRAINOSUS, NOT INTRACTABLE, UNSPECIFIED MIGRAINE TYPE: ICD-10-CM

## 2024-09-05 DIAGNOSIS — Z88.0 ALLERGY TO PENICILLIN: ICD-10-CM

## 2024-09-05 DIAGNOSIS — F41.9 ANXIETY: ICD-10-CM
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
## 2024-09-05 DIAGNOSIS — M81.0 OSTEOPOROSIS, UNSPECIFIED OSTEOPOROSIS TYPE, UNSPECIFIED PATHOLOGICAL FRACTURE PRESENCE: ICD-10-CM

## 2024-09-05 DIAGNOSIS — I10 PRIMARY HYPERTENSION: ICD-10-CM

## 2024-09-05 DIAGNOSIS — Z90.49 S/P APPY: ICD-10-CM

## 2024-09-05 DIAGNOSIS — R00.0 SINUS TACHYCARDIA: ICD-10-CM

## 2024-09-05 DIAGNOSIS — Z72.0 TOBACCO ABUSE: ICD-10-CM

## 2024-09-05 DIAGNOSIS — R94.31 ABNORMAL EKG: ICD-10-CM

## 2024-09-05 DIAGNOSIS — R55 SYNCOPE AND COLLAPSE: ICD-10-CM

## 2024-09-05 DIAGNOSIS — K21.9 GASTROESOPHAGEAL REFLUX DISEASE WITHOUT ESOPHAGITIS: ICD-10-CM

## 2024-09-05 DIAGNOSIS — R73.03 PRE-DIABETES: ICD-10-CM

## 2024-09-05 DIAGNOSIS — M06.9 RHEUMATOID ARTHRITIS, INVOLVING UNSPECIFIED SITE, UNSPECIFIED WHETHER RHEUMATOID FACTOR PRESENT (MULTI): ICD-10-CM

## 2024-09-05 DIAGNOSIS — Z82.49 FAMILY HISTORY OF ARRHYTHMIA: ICD-10-CM

## 2024-09-05 DIAGNOSIS — Z82.49 FAMILY HISTORY OF EARLY CAD: ICD-10-CM

## 2024-09-05 PROCEDURE — 3078F DIAST BP <80 MM HG: CPT | Performed by: INTERNAL MEDICINE

## 2024-09-05 PROCEDURE — 1159F MED LIST DOCD IN RCRD: CPT | Performed by: INTERNAL MEDICINE

## 2024-09-05 PROCEDURE — 3077F SYST BP >= 140 MM HG: CPT | Performed by: INTERNAL MEDICINE

## 2024-09-05 PROCEDURE — 99214 OFFICE O/P EST MOD 30 MIN: CPT | Performed by: INTERNAL MEDICINE

## 2024-09-05 PROCEDURE — 3008F BODY MASS INDEX DOCD: CPT | Performed by: INTERNAL MEDICINE

## 2024-09-05 RX ORDER — MV/FA/DHA/EPA/FISH OIL/SAW/GNK 400MCG-200
COMBINATION PACKAGE (EA) ORAL
COMMUNITY

## 2024-09-05 RX ORDER — METOPROLOL SUCCINATE 25 MG/1
25 TABLET, EXTENDED RELEASE ORAL 2 TIMES DAILY
Qty: 180 TABLET | Refills: 3 | Status: SHIPPED | OUTPATIENT
Start: 2024-09-05 | End: 2025-09-05

## 2024-09-05 RX ORDER — B-COMPLEX WITH VITAMIN C
TABLET ORAL
COMMUNITY

## 2024-09-05 RX ORDER — LOSARTAN POTASSIUM 100 MG/1
50 TABLET ORAL 2 TIMES DAILY
Qty: 90 TABLET | Refills: 3 | Status: SHIPPED | OUTPATIENT
Start: 2024-09-05

## 2024-09-05 NOTE — PATIENT INSTRUCTIONS
Exercise diet weight loss program.    Hydrate    Use My Chart portal for reviewing records, testing and contacting office.     Take your losartan 100 mg half tablet twice daily.    Take your metoprolol at 25 mg twice daily.    Magnesium 400 mg daily    Complete your tilt table study.  Hold your losartan and metoprolol the day before and the day of the test.

## 2024-09-05 NOTE — PROGRESS NOTES
CARDIOLOGY OFFICE NOTE     Date:   9/5/2024    Patient:    Sang Joy    YOB: 1955    Primary Physician: AHMET Prabhakar       Reason for Visit: Cardiology follow-up after testing.     HPI:     Sang Joy was seen in cardiac evaluation at the  Cardiology office September 5, 2024.      The patients problems are listed as in the impression below.    Electronic medical records reviewed.    Patient returns.  She states that she feels better overall on her Toprol.  She does have a blood pressure diary which notes blood pressures are lower in the afternoon.  She has taken her losartan 100 mg in the morning.  She takes her Toprol in the evening.    She has no significant change in symptoms.  She has had no syncope or recurrent falls.    Patient denies Chest Pain, SOB, Lightheadedness, Dizziness, TIA or CVA symptoms.  No CHF or Edema.  No Palpitations.  No GI,  or Bleeding Issues. No Recent Fever or Chills.     Testing was reviewed with the CT calcium score 0 and nuclear stress test negative.    Holter monitor noted paroxysmal atrial tachycardia salvos and rare APCs PVCs only.  No atrial fibrillation.  No high-grade AV blocks or pauses.    Her tilt table study is scheduled early next week.    Cardiovascular and general review of systems is otherwise negative.    A 14-system review is otherwise negative, other than noted.     PHYSICAL EXAMINATION:      Vitals:    09/05/24 1017   BP: 162/78   Pulse: 83   SpO2: 100%     General: No acute distress. Vital signs as noted. Alert and oriented.  Head And Neck Examination: No jugular venous distention, no carotid bruits, no mass. Carotid upstrokes preserved. Oral mucosa moist.  No xanthelasma. Head and neck examination otherwise unremarkable.  Lungs: Clear to auscultation and percussion. No wheezes, no rales,  and no rhonchi.  Chest: Excursion appeared to be normal. No chest wall tenderness on palpation.  Heart: Normal S1 and S2. No S3. No  S4. No rub. Grade 1/6 systolic murmur, best heard at the left sternal border. Point of maximal impulse was within normal limits.  Abdomen: Soft. Nontender. No organomegaly. No bruits. No masses.  Obese  Extremities: No bipedal edema. No clubbing. No cyanosis.  Pulses are strong throughout. No bruits.  Musculoskeletal Exam: No ulcers, otherwise unremarkable.  Neuro: Neurologically appeared grossly intact.    IMPRESSION:      Chest pain falls  Syncope  Sinus tachycardia  Chest pain  Lightheadedness  Palpitations  Migraine headaches  Hypertension  Hyperlipidemia  Paroxysmal atrial tachycardia, 48-hour Holter monitor 8/2024.  Atrial and ventricular premature contractions, rare.  LV systolic function, EF 60%, echocardiogram 3/2024  Negative Lexiscan Myoview perfusion stress test, 8/2024.  Hypomagnesemia.  GERD   Rheumatoid arthritis  Osteopenia  Prior appendectomy  Tobacco abuse  Penicillin allergy  Family history of coronary artery disease and atrial fibrillation  Otherwise as per assessment below.    RECOMMENDATIONS:      Patient overall appears to be doing better on her medications.  This is suggestive spacing her losartan to 50 mg twice daily.  Will increase her Toprol to 25 mg twice daily as well.  She will take magnesium oxide 400 mg daily.  She will continue her other medications.  Refills were provided.    Exercise dietary program.  Hydration.    She plans to complete her tilt table study this next week.  She will hold her losartan and Toprol prior and resume afterwards.    [employer] portal use was encouraged.    We will plan to see back in 1 month with Laboratory Studies and ECG as ordered.     Patient will follow up with their primary physician for general care.    The patient knows to contact medical care earlier if need be.      ALLERGIES:     Allergies   Allergen Reactions    Clarithromycin Shortness of breath    Garlic Diarrhea    Influenza Virus Vaccines Anaphylaxis    Iodine Hives    Penicillins Anaphylaxis     Acetaminophen Other     Cannot take while on Plaquenil    Codeine Nausea/vomiting    Tetanus Toxoid, Adsorbed Other    Amoxicillin Rash        MEDICATIONS:     Current Outpatient Medications   Medication Instructions    albuterol (ProAir HFA) 90 mcg/actuation inhaler 2 puffs, inhalation, Every 6 hours PRN    b complex-vitamin c tablet oral    calcium carbonate 600 mg calcium (1,500 mg) tablet 1 tablet, oral, Daily    calcium-vitamin D3-vitamin K (Viactiv) 650 mg-12.5 mcg-40 mcg chewable tablet 2 tablets, oral, Daily    DOCOSAHEXAENOIC ACID-EPA ORAL oral, Daily RT    fluticasone (Flonase Allergy Relief) 50 mcg/actuation nasal spray 1 sprays, Nasal, DAILY, in each nostril, Refill(s) 0    furosemide (LASIX) 40 mg, oral, Daily PRN    hydroxychloroquine (Plaquenil) 200 mg tablet 1 tablet, oral, Daily    krill oil 500 mg capsule oral    losartan (COZAAR) 100 mg, oral, Daily    magnesium oxide (MAG-OX) 400 mg, oral, Daily    meclizine (ANTIVERT) 12.5 mg, oral, 3 times daily PRN    metoprolol succinate XL (TOPROL-XL) 25 mg, oral, Daily, Do not crush or chew.    multivitamin capsule 1 capsule, Daily    omega-3 (Fish Oil) 60- mg capsule 500 mg, oral, Daily, David red    omeprazole OTC (PRILOSEC OTC) 20 mg    POTASSIUM GLUCONATE ORAL 90 mg, oral, Daily       ELECTROCARDIOGRAM:      None this visit    CARDIAC TESTING:      CT calcium score, 8/8/2024:  Score of 0.  Ascending aorta measured 2.8 cm.    Lexiscan Myoview perfusion stress test, 8/2024:  Normal without evidence of ischemia or myocardial infarction or significant coronary artery disease.  LVEF 93%.    Holter monitor, 48-hour, 8/2024:  Predominant sinus rhythm with normal heart rate variability.  Rare APCs PVCs.  Occasional short salvos of paroxysmal atrial tachycardia.  No atrial fibrillation or ventricular tachycardia.  No high-grade AV blocks or pauses.    LABORATORY DATA:      CBC:   Lab Results   Component Value Date    WBC 10.9 08/28/2024    RBC 3.71 (L)  08/28/2024    HGB 12.1 08/28/2024    HCT 35.6 (L) 08/28/2024     08/28/2024        CMP:    Lab Results   Component Value Date     (L) 08/28/2024    K 4.8 08/28/2024    CL 99 08/28/2024    CO2 23 08/28/2024    BUN 16 08/28/2024    CREATININE 1.00 08/28/2024    GLUCOSE 90 08/28/2024    CALCIUM 9.8 08/28/2024       Magnesium:    Lab Results   Component Value Date    MG 1.50 (L) 08/28/2024       Lipid Profile:    Lab Results   Component Value Date    CHOL 216 (H) 08/28/2024    TRIG 109 08/28/2024    .5 08/28/2024       Hepatic Function Panel:    Lab Results   Component Value Date    ALKPHOS 90 08/28/2024    ALT 21 08/28/2024    AST 33 08/28/2024    PROT 6.3 (L) 08/28/2024    BILITOT 0.7 08/28/2024    BILIDIR 0.2 08/28/2024       TSH:    Lab Results   Component Value Date    TSH 1.34 08/28/2024       HgBA1c:    Lab Results   Component Value Date    HGBA1C 5.8 (H) 03/09/2024       BNP:   Lab Results   Component Value Date     (H) 03/09/2024                PROBLEM LIST:     Patient Active Problem List   Diagnosis    Rheumatoid arthritis (Multi)    Fall    Essential hypertension    Tobacco abuse    Syncope and collapse    Acute renal failure (ARF) (CMS-HCC)    Electrolyte depletion    Acute on chronic anemia    Skin tear of forearm without complication, initial encounter    Hypotension due to drugs    Pre-diabetes    Hyponatremia    Hypomagnesemia    Osteopenia of multiple sites    Centrilobular emphysema (Multi)    Pulmonary nodules    Osteoporosis    Lightheadedness    Family history of arrhythmia    Allergy to penicillin    Mixed hyperlipidemia    Anxiety    Migraine without status migrainosus, not intractable    Gastroesophageal reflux disease without esophagitis    S/P appy    Sinus tachycardia    Abnormal EKG             Ttii Monroy MD, FACC   [facility] / [place] /  Cardiology      Of Note:  [employer] voice recognition dictation software was utilized partially in the preparation of this  note, therefore, inaccuracies in spelling, word choice and punctuation may have occurred which were not recognized the time of signing.    Patient was seen and examined with total time of visit including chart preparation, rooming, and chart completion exceeding 40 minutes.      ----

## 2024-09-09 ENCOUNTER — HOSPITAL ENCOUNTER (OUTPATIENT)
Dept: CARDIOLOGY | Facility: HOSPITAL | Age: 69
Discharge: HOME | End: 2024-09-09
Payer: MEDICARE

## 2024-09-09 VITALS — SYSTOLIC BLOOD PRESSURE: 107 MMHG | HEART RATE: 87 BPM | DIASTOLIC BLOOD PRESSURE: 52 MMHG | OXYGEN SATURATION: 98 %

## 2024-09-09 DIAGNOSIS — R94.31 ABNORMAL EKG: ICD-10-CM

## 2024-09-09 DIAGNOSIS — I10 ESSENTIAL HYPERTENSION: ICD-10-CM

## 2024-09-09 DIAGNOSIS — R42 LIGHTHEADEDNESS: ICD-10-CM

## 2024-09-09 DIAGNOSIS — M06.9 RHEUMATOID ARTHRITIS, INVOLVING UNSPECIFIED SITE, UNSPECIFIED WHETHER RHEUMATOID FACTOR PRESENT (MULTI): ICD-10-CM

## 2024-09-09 DIAGNOSIS — E78.2 MIXED HYPERLIPIDEMIA: ICD-10-CM

## 2024-09-09 DIAGNOSIS — K21.9 GASTROESOPHAGEAL REFLUX DISEASE WITHOUT ESOPHAGITIS: ICD-10-CM

## 2024-09-09 DIAGNOSIS — R73.03 PRE-DIABETES: ICD-10-CM
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
## 2024-09-09 DIAGNOSIS — Z88.0 ALLERGY TO PENICILLIN: ICD-10-CM

## 2024-09-09 DIAGNOSIS — Z90.49 S/P APPY: ICD-10-CM

## 2024-09-09 DIAGNOSIS — Z82.49 FAMILY HISTORY OF ARRHYTHMIA: ICD-10-CM

## 2024-09-09 DIAGNOSIS — M81.0 OSTEOPOROSIS, UNSPECIFIED OSTEOPOROSIS TYPE, UNSPECIFIED PATHOLOGICAL FRACTURE PRESENCE: ICD-10-CM

## 2024-09-09 DIAGNOSIS — G43.909 MIGRAINE WITHOUT STATUS MIGRAINOSUS, NOT INTRACTABLE, UNSPECIFIED MIGRAINE TYPE: ICD-10-CM

## 2024-09-09 DIAGNOSIS — Z82.49 FAMILY HISTORY OF EARLY CAD: ICD-10-CM

## 2024-09-09 DIAGNOSIS — Z72.0 TOBACCO ABUSE: ICD-10-CM

## 2024-09-09 DIAGNOSIS — R00.0 SINUS TACHYCARDIA: ICD-10-CM

## 2024-09-09 DIAGNOSIS — R55 SYNCOPE AND COLLAPSE: ICD-10-CM

## 2024-09-09 DIAGNOSIS — F41.9 ANXIETY: ICD-10-CM

## 2024-09-09 PROCEDURE — 2500000001 HC RX 250 WO HCPCS SELF ADMINISTERED DRUGS (ALT 637 FOR MEDICARE OP): Performed by: INTERNAL MEDICINE

## 2024-09-09 PROCEDURE — 93660 TILT TABLE EVALUATION: CPT | Performed by: INTERNAL MEDICINE

## 2024-09-09 PROCEDURE — 93660 TILT TABLE EVALUATION: CPT

## 2024-09-09 RX ORDER — NITROGLYCERIN 0.4 MG/1
TABLET SUBLINGUAL AS NEEDED
Status: DISCONTINUED | OUTPATIENT
Start: 2024-09-09 | End: 2024-09-09 | Stop reason: HOSPADM

## 2024-09-23 ENCOUNTER — APPOINTMENT (OUTPATIENT)
Dept: PRIMARY CARE | Facility: CLINIC | Age: 69
End: 2024-09-23
Payer: MEDICARE

## 2024-09-23 VITALS
WEIGHT: 90 LBS | DIASTOLIC BLOOD PRESSURE: 70 MMHG | BODY MASS INDEX: 16.56 KG/M2 | RESPIRATION RATE: 20 BRPM | HEIGHT: 62 IN | HEART RATE: 84 BPM | TEMPERATURE: 97.2 F | SYSTOLIC BLOOD PRESSURE: 132 MMHG

## 2024-09-23 DIAGNOSIS — I10 PRIMARY HYPERTENSION: ICD-10-CM

## 2024-09-23 DIAGNOSIS — Z72.0 TOBACCO ABUSE: ICD-10-CM

## 2024-09-23 DIAGNOSIS — I47.19 PAT (PAROXYSMAL ATRIAL TACHYCARDIA) (CMS-HCC): Primary | ICD-10-CM

## 2024-09-23 DIAGNOSIS — Z87.891 PERSONAL HISTORY OF TOBACCO USE, PRESENTING HAZARDS TO HEALTH: ICD-10-CM

## 2024-09-23 DIAGNOSIS — K21.9 GASTROESOPHAGEAL REFLUX DISEASE WITHOUT ESOPHAGITIS: ICD-10-CM

## 2024-09-23 DIAGNOSIS — R55 SYNCOPE AND COLLAPSE: ICD-10-CM

## 2024-09-23 DIAGNOSIS — E83.42 HYPOMAGNESEMIA: ICD-10-CM

## 2024-09-23 DIAGNOSIS — E78.2 MIXED HYPERLIPIDEMIA: ICD-10-CM

## 2024-09-23 DIAGNOSIS — J43.2 CENTRILOBULAR EMPHYSEMA (MULTI): ICD-10-CM

## 2024-09-23 DIAGNOSIS — R73.03 PRE-DIABETES: ICD-10-CM

## 2024-09-23 PROBLEM — S51.819A SKIN TEAR OF FOREARM WITHOUT COMPLICATION, INITIAL ENCOUNTER: Status: RESOLVED | Noted: 2024-03-10 | Resolved: 2024-09-23

## 2024-09-23 PROBLEM — N17.9 ACUTE RENAL FAILURE (ARF) (CMS-HCC): Status: RESOLVED | Noted: 2024-03-10 | Resolved: 2024-09-23

## 2024-09-23 PROCEDURE — 99214 OFFICE O/P EST MOD 30 MIN: CPT | Performed by: NURSE PRACTITIONER

## 2024-09-23 ASSESSMENT — ENCOUNTER SYMPTOMS
FEVER: 0
NUMBNESS: 1
FATIGUE: 0
ARTHRALGIAS: 1
APPETITE CHANGE: 0
NECK PAIN: 1
BACK PAIN: 1
LIGHT-HEADEDNESS: 1
PALPITATIONS: 0
UNEXPECTED WEIGHT CHANGE: 0
WHEEZING: 1
SLEEP DISTURBANCE: 0
COUGH: 1
CHILLS: 0
DYSPHORIC MOOD: 0
NERVOUS/ANXIOUS: 1

## 2024-09-23 NOTE — PROGRESS NOTES
"Subjective   Patient ID: Chavez Joy is a 69 y.o. female who presents for Follow-up (6 month follow up.).    HPI     Review of Systems   Constitutional:  Negative for appetite change, chills, fatigue, fever and unexpected weight change.   HENT:  Positive for sneezing.    Respiratory:  Positive for cough (increased recently, states d/t fall allergies and asthma flaring up.) and wheezing.    Cardiovascular:  Negative for chest pain and palpitations.   Musculoskeletal:  Positive for arthralgias (hands, neck, back, knees, hips), back pain and neck pain.   Neurological:  Positive for light-headedness and numbness (fingers d/t arthritis).   Psychiatric/Behavioral:  Negative for dysphoric mood and sleep disturbance. The patient is nervous/anxious (occasional, usually when there are unexpected bills.).      Objective   /70   Pulse 84   Temp 36.2 °C (97.2 °F)   Resp 20   Ht 1.575 m (5' 2\")   Wt (!) 40.8 kg (90 lb)   BMI 16.46 kg/m²     Physical Exam  Vitals reviewed.   Constitutional:       Appearance: Normal appearance. She is not ill-appearing.   HENT:      Head: Normocephalic.   Eyes:      Conjunctiva/sclera: Conjunctivae normal.   Cardiovascular:      Rate and Rhythm: Normal rate and regular rhythm.      Pulses: Normal pulses.   Pulmonary:      Breath sounds: Normal breath sounds.   Abdominal:      General: Bowel sounds are normal.      Palpations: Abdomen is soft.   Musculoskeletal:      Cervical back: Neck supple.   Skin:     General: Skin is warm and dry.   Neurological:      General: No focal deficit present.      Mental Status: She is alert.   Psychiatric:         Mood and Affect: Mood normal.         Thought Content: Thought content normal.         Assessment/Plan   Problem List Items Addressed This Visit       Primary hypertension    Overview     Managed by Dr. Monroy w/ losartan 100 mg, metoprolol succ 25 mg BID         Current Assessment & Plan     Controlled. Will continue current treatment " plan.            Tobacco abuse    Current Assessment & Plan     Still smoking approx 1/2 ppd. She is considering quitting, but is not ready to make that change yet. She states she will reach out when she is ready for assistance.          Syncope and collapse    Current Assessment & Plan     Follows w/ Dr. Titi Monroy. Has F/U with him on 10/10/24 following recent tilt table test, EKG, Holter monitor, echo results, and CACS, all of which I have reviewed.. Pt. States she had a lot of dizziness for 4 days after TTT.         Pre-diabetes    Current Assessment & Plan     A1C 5.8% 3/9/2024.          Hypomagnesemia    Current Assessment & Plan     Mg++ was 1.5. However, she had inadavertently not been taking her magox supplement, but has since resumed taking it.          Centrilobular emphysema (Multi)    Overview     Noted on LDCT 4/10/24, mild, bilateral  Needs repeat LDCT 4/11/2025         Current Assessment & Plan     States she has been coughing and wheezing a bit more recently, but attributes it to fall allergies. Advised to quit smoking, but she states she is not ready yet. She is not on a controller medication for her emphysema and declines adding one at this time. She states she will continue her Flonase and Zyrtec for symptoms. States she uses her albuterol inhaler once per day on average.          Mixed hyperlipidemia    Current Assessment & Plan     This is managed w/ diet and exercise and was much improved on her 8/2024 labs. Not taking a statin.         Gastroesophageal reflux disease without esophagitis    Overview     Managed w/ omeprazole 20 mg daily.         Current Assessment & Plan     Controlled. Will continue current treatment plan.  States she sleeps with HOB elevated slightly to prevent symptoms at night.          PAT (paroxysmal atrial tachycardia) (CMS-[facility]) - Primary    Overview     Follows w/ Dr. Monroy. Managed w/ metoprolol succ 25 mg BID.          Other Visit Diagnoses        Personal history of tobacco use, presenting hazards to health

## 2024-09-23 NOTE — ASSESSMENT & PLAN NOTE
Still smoking approx 1/2 ppd. She is considering quitting, but is not ready to make that change yet. She states she will reach out when she is ready for assistance.

## 2024-09-23 NOTE — ASSESSMENT & PLAN NOTE
Controlled. Will continue current treatment plan.  States she sleeps with HOB elevated slightly to prevent symptoms at night.

## 2024-09-23 NOTE — ASSESSMENT & PLAN NOTE
This is managed w/ diet and exercise and was much improved on her 8/2024 labs. Not taking a statin.

## 2024-09-23 NOTE — ASSESSMENT & PLAN NOTE
Mg++ was 1.5. However, she had inadavertently not been taking her magox supplement, but has since resumed taking it.

## 2024-09-23 NOTE — ASSESSMENT & PLAN NOTE
States she has been coughing and wheezing a bit more recently, but attributes it to fall allergies. Advised to quit smoking, but she states she is not ready yet. She is not on a controller medication for her emphysema and declines adding one at this time. She states she will continue her Flonase and Zyrtec for symptoms. States she uses her albuterol inhaler once per day on average.

## 2024-09-23 NOTE — ASSESSMENT & PLAN NOTE
Follows w/ Dr. Titi Monroy. Has F/U with him on 10/10/24 following recent tilt table test, EKG, Holter monitor, echo results, and CACS, all of which I have reviewed.. Pt. States she had a lot of dizziness for 4 days after TTT.

## 2024-10-10 ENCOUNTER — APPOINTMENT (OUTPATIENT)
Dept: CARDIOLOGY | Facility: CLINIC | Age: 69
End: 2024-10-10
Payer: MEDICARE

## 2024-10-10 VITALS
WEIGHT: 90 LBS | HEART RATE: 96 BPM | DIASTOLIC BLOOD PRESSURE: 70 MMHG | SYSTOLIC BLOOD PRESSURE: 110 MMHG | OXYGEN SATURATION: 99 % | HEIGHT: 62 IN | BODY MASS INDEX: 16.56 KG/M2

## 2024-10-10 DIAGNOSIS — I10 PRIMARY HYPERTENSION: ICD-10-CM

## 2024-10-10 DIAGNOSIS — R42 LIGHTHEADEDNESS: ICD-10-CM

## 2024-10-10 DIAGNOSIS — Z90.49 S/P APPY: ICD-10-CM

## 2024-10-10 DIAGNOSIS — Z88.0 ALLERGY TO PENICILLIN: ICD-10-CM

## 2024-10-10 DIAGNOSIS — Z82.49 FAMILY HISTORY OF ARRHYTHMIA: ICD-10-CM

## 2024-10-10 DIAGNOSIS — Z72.0 TOBACCO ABUSE: ICD-10-CM

## 2024-10-10 DIAGNOSIS — I10 ESSENTIAL HYPERTENSION: ICD-10-CM

## 2024-10-10 DIAGNOSIS — M81.0 OSTEOPOROSIS, UNSPECIFIED OSTEOPOROSIS TYPE, UNSPECIFIED PATHOLOGICAL FRACTURE PRESENCE: ICD-10-CM

## 2024-10-10 DIAGNOSIS — M06.9 RHEUMATOID ARTHRITIS, INVOLVING UNSPECIFIED SITE, UNSPECIFIED WHETHER RHEUMATOID FACTOR PRESENT (MULTI): ICD-10-CM

## 2024-10-10 DIAGNOSIS — R94.31 ABNORMAL EKG: ICD-10-CM

## 2024-10-10 DIAGNOSIS — E78.2 MIXED HYPERLIPIDEMIA: ICD-10-CM

## 2024-10-10 DIAGNOSIS — R94.09 ABNORMAL TILT TABLE TEST: Primary | ICD-10-CM

## 2024-10-10 DIAGNOSIS — F41.9 ANXIETY: ICD-10-CM

## 2024-10-10 DIAGNOSIS — R55 SYNCOPE AND COLLAPSE: ICD-10-CM

## 2024-10-10 DIAGNOSIS — K21.9 GASTROESOPHAGEAL REFLUX DISEASE WITHOUT ESOPHAGITIS: ICD-10-CM

## 2024-10-10 DIAGNOSIS — R00.2 PALPITATIONS: ICD-10-CM

## 2024-10-10 DIAGNOSIS — R94.30 ABNORMAL CARDIAC FUNCTION TEST: ICD-10-CM

## 2024-10-10 DIAGNOSIS — R55 VASODEPRESSOR SYNCOPE: ICD-10-CM

## 2024-10-10 DIAGNOSIS — R00.0 SINUS TACHYCARDIA: ICD-10-CM
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
## 2024-10-10 DIAGNOSIS — R73.03 PRE-DIABETES: ICD-10-CM

## 2024-10-10 DIAGNOSIS — Z82.49 FAMILY HISTORY OF EARLY CAD: ICD-10-CM

## 2024-10-10 DIAGNOSIS — I47.19 PAT (PAROXYSMAL ATRIAL TACHYCARDIA) (CMS-HCC): ICD-10-CM

## 2024-10-10 DIAGNOSIS — G43.909 MIGRAINE WITHOUT STATUS MIGRAINOSUS, NOT INTRACTABLE, UNSPECIFIED MIGRAINE TYPE: ICD-10-CM

## 2024-10-10 PROCEDURE — 3074F SYST BP LT 130 MM HG: CPT | Performed by: INTERNAL MEDICINE

## 2024-10-10 PROCEDURE — 3008F BODY MASS INDEX DOCD: CPT | Performed by: INTERNAL MEDICINE

## 2024-10-10 PROCEDURE — 1159F MED LIST DOCD IN RCRD: CPT | Performed by: INTERNAL MEDICINE

## 2024-10-10 PROCEDURE — 3078F DIAST BP <80 MM HG: CPT | Performed by: INTERNAL MEDICINE

## 2024-10-10 PROCEDURE — 99214 OFFICE O/P EST MOD 30 MIN: CPT | Performed by: INTERNAL MEDICINE

## 2024-10-10 RX ORDER — METOPROLOL SUCCINATE 50 MG/1
50 TABLET, EXTENDED RELEASE ORAL 2 TIMES DAILY
Qty: 180 TABLET | Refills: 3 | Status: SHIPPED | OUTPATIENT
Start: 2024-10-10 | End: 2024-10-14 | Stop reason: SDUPTHER

## 2024-10-10 RX ORDER — LOSARTAN POTASSIUM 25 MG/1
25 TABLET ORAL 2 TIMES DAILY
Qty: 180 TABLET | Refills: 3 | Status: SHIPPED | OUTPATIENT
Start: 2024-10-10 | End: 2024-10-14 | Stop reason: SDUPTHER

## 2024-10-10 NOTE — PROGRESS NOTES
CARDIOLOGY OFFICE NOTE     Date:   10/10/2024    Patient:    Sang Joy    YOB: 1955    Primary Physician: AHMET Prabhakar       Reason for Visit: Cardiology follow-up for testing results.       HPI:     Sang Joy was seen in cardiac evaluation at the  Cardiology office October 10, 2024.      The patients problems are listed as in the impression below.    Electronic medical records reviewed.    Patient returns up.  Had cardiac testing returns.  Blood pressure medications were changed.  Blood pressure is improved.  Feels well overall.    Has had no recurrent falls.  Feels well otherwise.    Patient denies Chest Pain, SOB, Lightheadedness, Dizziness, TIA or CVA symptoms.  No CHF or Edema.  No Palpitations.  No GI,  or Bleeding Issues. No Recent Fever or Chills.     Cardiovascular and general review of systems is otherwise negative.    A 14-system review is otherwise negative, other than noted.     PHYSICAL EXAMINATION:      Vitals:    10/10/24 1337   BP: 110/70   Pulse: 96   SpO2: 99%     General: No acute distress. Alert and oriented.  Head And Neck Examination: No jugular venous distention, no carotid bruits, no mass. Carotid upstrokes preserved. Oral mucosa moist.  No xanthelasma. Head and neck examination otherwise unremarkable.  Lungs: Clear to auscultation and percussion. No wheezes, no rales,  and no rhonchi.  Chest: Excursion appeared to be normal. No chest wall tenderness on palpation.  Heart: Normal S1 and S2. No S3. No S4. No rub. Grade 1/6 systolic murmur, best heard at the left sternal border. Point of maximal impulse was within normal limits.  Abdomen: Soft. Nontender. No organomegaly. No bruits. No masses.  Obese  Extremities: No bipedal edema. No clubbing. No cyanosis.  Pulses are strong throughout. No bruits.  Musculoskeletal Exam: No ulcers, otherwise unremarkable.  Neuro: Neurologically appeared grossly intact.     IMPRESSION:       Chest pain    Falls  Syncope  Sinus tachycardia  Chest pain  Lightheadedness  Palpitations  Migraine headaches  Vasodepressive syncope / neurally mediated syncope, positive tilt table study 9/2024.  Paroxysmal atrial tachycardia, 48-hour Holter monitor 8/2024.  Atrial and ventricular premature contractions, rare.  LV systolic function, EF 60%, echocardiogram 3/2024  Negative Lexiscan Myoview perfusion stress test, 8/2024.  Hypertension  Hyperlipidemia  Hypomagnesemia.  GERD   Rheumatoid arthritis  Osteopenia  Prior appendectomy  Tobacco abuse  Penicillin allergy  Family history of coronary artery disease and atrial fibrillation  Otherwise as per assessment below.    RECOMMENDATIONS:      Patient has a vasodepressive neuromediated syncope with positive tilt table study.  Would suggest increasing Toprol-XL to 50 mg twice daily and decreasing losartan to 25 mg twice daily as tolerated.  Refills were provided.    Exercise dietary program was encouraged.    Hydration.    Lower extremity support stockings may be beneficial.    [employer] portal use was encouraged.    We will plan to see back in 6 weeks with Laboratory Studies and ECG as ordered.     Patient will follow up with their primary physician for general care.    The patient knows to contact medical care earlier if need be.      ALLERGIES:     Allergies   Allergen Reactions    Clarithromycin Shortness of breath    Garlic Diarrhea    Influenza Virus Vaccines Anaphylaxis    Iodine Hives    Penicillins Anaphylaxis    Acetaminophen Other     Cannot take while on Plaquenil    Codeine Nausea/vomiting    Tetanus Toxoid, Adsorbed Other    Amoxicillin Rash        MEDICATIONS:     Current Outpatient Medications   Medication Instructions    albuterol (ProAir HFA) 90 mcg/actuation inhaler 2 puffs, inhalation, Every 6 hours PRN    b complex-vitamin c tablet oral    calcium carbonate 600 mg calcium (1,500 mg) tablet 1 tablet, oral, Daily    calcium-vitamin D3-vitamin K (Viactiv) 650 mg-12.5  mcg-40 mcg chewable tablet 2 tablets, oral, Daily    fluticasone (Flonase Allergy Relief) 50 mcg/actuation nasal spray 1 sprays, Nasal, DAILY, in each nostril, Refill(s) 0    furosemide (LASIX) 40 mg, oral, Daily PRN    hydroxychloroquine (Plaquenil) 200 mg tablet 1 tablet, oral, Daily    krill oil 500 mg capsule oral    losartan (COZAAR) 50 mg, oral, 2 times daily    magnesium oxide (MAG-OX) 400 mg, oral, Daily    meclizine (ANTIVERT) 12.5 mg, oral, 3 times daily PRN    metoprolol succinate XL (TOPROL-XL) 25 mg, oral, 2 times daily, Do not crush or chew.    multivitamin capsule 1 capsule, Daily    omeprazole OTC (PRILOSEC OTC) 20 mg    POTASSIUM GLUCONATE ORAL 90 mg, oral, Daily       ELECTROCARDIOGRAM:      None this visit    CARDIAC TESTING:      Tilt table study, 9/2024:  Positive study for vasodepressive syncope /  neuro mediated syncope.    LABORATORY DATA:      CBC:   Lab Results   Component Value Date    WBC 10.9 08/28/2024    RBC 3.71 (L) 08/28/2024    HGB 12.1 08/28/2024    HCT 35.6 (L) 08/28/2024     08/28/2024        CMP:    Lab Results   Component Value Date     (L) 08/28/2024    K 4.8 08/28/2024    CL 99 08/28/2024    CO2 23 08/28/2024    BUN 16 08/28/2024    CREATININE 1.00 08/28/2024    GLUCOSE 90 08/28/2024    CALCIUM 9.8 08/28/2024       Magnesium:    Lab Results   Component Value Date    MG 1.50 (L) 08/28/2024       Lipid Profile:    Lab Results   Component Value Date    CHOL 216 (H) 08/28/2024    TRIG 109 08/28/2024    .5 08/28/2024       Hepatic Function Panel:    Lab Results   Component Value Date    ALKPHOS 90 08/28/2024    ALT 21 08/28/2024    AST 33 08/28/2024    PROT 6.3 (L) 08/28/2024    BILITOT 0.7 08/28/2024    BILIDIR 0.2 08/28/2024       TSH:    Lab Results   Component Value Date    TSH 1.34 08/28/2024         DIAGNOSTIC.:     Tilt Table    Result Date: 9/9/2024  Tilt Table Study DOS: 9/09/24 Date I received for dictation: 09/09/24 Summary: Abnormal Tilt table  testing Vasodepressor response to head up tilt testing. Recommendations: The patient should continue with the present medications. Discharge: Patient was discharged from procedure room after vital signs returned to baseline. Follow up: Follow up with PCP or cardiology office in six weeks. Procedures: Tilt table test. Patient history: Health history was acquired from the patient's chart and the patient. Signs/symptoms include lightheadedness and syncope. Procedure narrative: The risks, benefits, and alternatives to the procedure were explained to the patient, and informed consent was obtained. The patient was in the fasting state. A baseline ECG was recorded. Self-adhesive anterior-posterior defibrillation pads were applied. A ZOLL defibrillator was used for monitoring. The patient was set up for continuous monitoring of surface 12 lead ECG. Blood pressure was monitored with automatic cuff measurements. The peripheral vein was obtained for intravenous fluid administration. The patient was placed on a tilt table, and soft protection belts applied for patient safety. Vital signs were monitored and documented the first 15 minutes in the supine position for baseline readings. Bp 140/70,  bpm, sius tachycardia.Upon completion of 15 minutes, the table was tilted to with a table angle of 70°, and vital signs were recorded every one minute for 20 minutes. The patient was instructed to report any symptoms during the procedure. The patient remained asymptomatic and the table placed in the supine position. Nitroglycerin 0.3 mg was given sublingually. Again, the table was tilted to 70 degrees. Vital signs were monitored and documented every one minute and the patient instructed to report any symptoms. The patient was symptomatic with lightheadedness, near syncope, and warmth at 6 minutes after nitroglycerin. The patient was able to stand for 19 minutes (minimal BP 52/38 with  , Rhythm sinus tachycardia), and returned  to the supine position until fully recovered. IV fluids were administered. The peripheral IV was discontinued once vital signs normalized. See signed procedural log and parameters. Complications: The patient tolerated the procedure without any complications or incident.                 PROBLEM LIST:     Patient Active Problem List   Diagnosis    Rheumatoid arthritis    Fall    Primary hypertension    Tobacco abuse    Syncope and collapse    Electrolyte depletion    Acute on chronic anemia    Hypotension due to drugs    Pre-diabetes    Hyponatremia    Hypomagnesemia    Osteopenia of multiple sites    Centrilobular emphysema (Multi)    Pulmonary nodules    Osteoporosis    Lightheadedness    Family history of arrhythmia    Allergy to penicillin    Mixed hyperlipidemia    Anxiety    Migraine without status migrainosus, not intractable    Gastroesophageal reflux disease without esophagitis    S/P appy    Sinus tachycardia    Abnormal EKG    Palpitations    PAT (paroxysmal atrial tachycardia) (CMS-HCC)    Abnormal cardiac function test             Titi Monroy MD, [facility]   [facility] /  Cardiology      Of Note:  HR[employer] voice recognition dictation software was utilized partially in the preparation of this note, therefore, inaccuracies in spelling, word choice and punctuation may have occurred which were not recognized the time of signing.    Patient was seen and examined with total time of visit including chart preparation, rooming, and chart completion exceeding 40 minutes.      ----
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
## 2024-10-14 DIAGNOSIS — R55 SYNCOPE AND COLLAPSE: ICD-10-CM

## 2024-10-14 DIAGNOSIS — R94.31 ABNORMAL EKG: ICD-10-CM

## 2024-10-14 DIAGNOSIS — I10 ESSENTIAL HYPERTENSION: ICD-10-CM

## 2024-10-14 DIAGNOSIS — Z72.0 TOBACCO ABUSE: ICD-10-CM

## 2024-10-14 DIAGNOSIS — R73.03 PRE-DIABETES: ICD-10-CM

## 2024-10-14 DIAGNOSIS — Z82.49 FAMILY HISTORY OF EARLY CAD: ICD-10-CM

## 2024-10-14 DIAGNOSIS — G43.909 MIGRAINE WITHOUT STATUS MIGRAINOSUS, NOT INTRACTABLE, UNSPECIFIED MIGRAINE TYPE: ICD-10-CM

## 2024-10-14 DIAGNOSIS — M81.0 OSTEOPOROSIS, UNSPECIFIED OSTEOPOROSIS TYPE, UNSPECIFIED PATHOLOGICAL FRACTURE PRESENCE: ICD-10-CM

## 2024-10-14 DIAGNOSIS — E78.2 MIXED HYPERLIPIDEMIA: ICD-10-CM

## 2024-10-14 DIAGNOSIS — Z90.49 S/P APPY: ICD-10-CM

## 2024-10-14 DIAGNOSIS — I10 PRIMARY HYPERTENSION: ICD-10-CM

## 2024-10-14 DIAGNOSIS — K21.9 GASTROESOPHAGEAL REFLUX DISEASE WITHOUT ESOPHAGITIS: ICD-10-CM

## 2024-10-14 DIAGNOSIS — Z82.49 FAMILY HISTORY OF ARRHYTHMIA: ICD-10-CM

## 2024-10-14 DIAGNOSIS — R00.0 SINUS TACHYCARDIA: ICD-10-CM

## 2024-10-14 DIAGNOSIS — R42 LIGHTHEADEDNESS: ICD-10-CM

## 2024-10-14 DIAGNOSIS — F41.9 ANXIETY: ICD-10-CM

## 2024-10-14 DIAGNOSIS — M06.9 RHEUMATOID ARTHRITIS, INVOLVING UNSPECIFIED SITE, UNSPECIFIED WHETHER RHEUMATOID FACTOR PRESENT (MULTI): ICD-10-CM

## 2024-10-14 DIAGNOSIS — Z88.0 ALLERGY TO PENICILLIN: ICD-10-CM

## 2024-10-15 RX ORDER — LOSARTAN POTASSIUM 25 MG/1
25 TABLET ORAL 2 TIMES DAILY
Qty: 180 TABLET | Refills: 3 | Status: SHIPPED | OUTPATIENT
Start: 2024-10-15 | End: 2025-10-15

## 2024-10-15 RX ORDER — METOPROLOL SUCCINATE 50 MG/1
50 TABLET, EXTENDED RELEASE ORAL 2 TIMES DAILY
Qty: 180 TABLET | Refills: 3 | Status: SHIPPED | OUTPATIENT
Start: 2024-10-15 | End: 2025-10-15

## 2024-10-28 ENCOUNTER — APPOINTMENT (OUTPATIENT)
Dept: CARDIOLOGY | Facility: HOSPITAL | Age: 69
End: 2024-10-28
Payer: MEDICARE

## 2024-10-29 ENCOUNTER — TELEPHONE (OUTPATIENT)
Dept: PRIMARY CARE | Facility: CLINIC | Age: 69
End: 2024-10-29
Payer: MEDICARE

## 2024-10-29 DIAGNOSIS — U07.1 COVID-19: Primary | ICD-10-CM

## 2024-10-29 RX ORDER — NIRMATRELVIR AND RITONAVIR 300-100 MG
3 KIT ORAL 2 TIMES DAILY
Qty: 30 TABLET | Refills: 0 | Status: SHIPPED | OUTPATIENT
Start: 2024-10-29 | End: 2024-11-03

## 2024-11-13 ENCOUNTER — APPOINTMENT (OUTPATIENT)
Dept: RHEUMATOLOGY | Facility: CLINIC | Age: 69
End: 2024-11-13
Payer: MEDICARE

## 2024-11-29 ENCOUNTER — LAB (OUTPATIENT)
Dept: LAB | Facility: LAB | Age: 69
End: 2024-11-29
Payer: MEDICARE
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
## 2024-11-29 DIAGNOSIS — R73.03 PRE-DIABETES: ICD-10-CM

## 2024-11-29 DIAGNOSIS — Z90.49 S/P APPY: ICD-10-CM

## 2024-11-29 DIAGNOSIS — G43.909 MIGRAINE WITHOUT STATUS MIGRAINOSUS, NOT INTRACTABLE, UNSPECIFIED MIGRAINE TYPE: ICD-10-CM

## 2024-11-29 DIAGNOSIS — K21.9 GASTROESOPHAGEAL REFLUX DISEASE WITHOUT ESOPHAGITIS: ICD-10-CM

## 2024-11-29 DIAGNOSIS — M81.0 OSTEOPOROSIS, UNSPECIFIED OSTEOPOROSIS TYPE, UNSPECIFIED PATHOLOGICAL FRACTURE PRESENCE: ICD-10-CM

## 2024-11-29 DIAGNOSIS — R94.30 ABNORMAL CARDIAC FUNCTION TEST: ICD-10-CM

## 2024-11-29 DIAGNOSIS — R94.31 ABNORMAL EKG: ICD-10-CM

## 2024-11-29 DIAGNOSIS — Z82.49 FAMILY HISTORY OF ARRHYTHMIA: ICD-10-CM

## 2024-11-29 DIAGNOSIS — I10 ESSENTIAL HYPERTENSION: ICD-10-CM

## 2024-11-29 DIAGNOSIS — Z72.0 TOBACCO ABUSE: ICD-10-CM

## 2024-11-29 DIAGNOSIS — M06.9 RHEUMATOID ARTHRITIS, INVOLVING UNSPECIFIED SITE, UNSPECIFIED WHETHER RHEUMATOID FACTOR PRESENT (MULTI): ICD-10-CM

## 2024-11-29 DIAGNOSIS — R42 LIGHTHEADEDNESS: ICD-10-CM

## 2024-11-29 DIAGNOSIS — F41.9 ANXIETY: ICD-10-CM

## 2024-11-29 DIAGNOSIS — Z88.0 ALLERGY TO PENICILLIN: ICD-10-CM

## 2024-11-29 DIAGNOSIS — Z82.49 FAMILY HISTORY OF EARLY CAD: ICD-10-CM

## 2024-11-29 DIAGNOSIS — E78.2 MIXED HYPERLIPIDEMIA: ICD-10-CM

## 2024-11-29 DIAGNOSIS — R55 SYNCOPE AND COLLAPSE: ICD-10-CM

## 2024-11-29 DIAGNOSIS — I10 PRIMARY HYPERTENSION: ICD-10-CM

## 2024-11-29 DIAGNOSIS — I47.19 PAT (PAROXYSMAL ATRIAL TACHYCARDIA) (CMS-HCC): ICD-10-CM

## 2024-11-29 DIAGNOSIS — R00.0 SINUS TACHYCARDIA: ICD-10-CM

## 2024-11-29 DIAGNOSIS — R00.2 PALPITATIONS: ICD-10-CM

## 2024-11-29 LAB
ANION GAP SERPL CALC-SCNC: 16 MMOL/L (ref 10–20)
BUN SERPL-MCNC: 18 MG/DL (ref 6–23)
CALCIUM SERPL-MCNC: 9.5 MG/DL (ref 8.6–10.3)
CHLORIDE SERPL-SCNC: 102 MMOL/L (ref 98–107)
CO2 SERPL-SCNC: 25 MMOL/L (ref 21–32)
CREAT SERPL-MCNC: 0.97 MG/DL (ref 0.5–1.05)
EGFRCR SERPLBLD CKD-EPI 2021: 64 ML/MIN/1.73M*2
ERYTHROCYTE [DISTWIDTH] IN BLOOD BY AUTOMATED COUNT: 13 % (ref 11.5–14.5)
GLUCOSE SERPL-MCNC: 96 MG/DL (ref 74–99)
HCT VFR BLD AUTO: 35.5 % (ref 36–46)
HGB BLD-MCNC: 11.9 G/DL (ref 12–16)
MCH RBC QN AUTO: 33.5 PG (ref 26–34)
MCHC RBC AUTO-ENTMCNC: 33.5 G/DL (ref 32–36)
MCV RBC AUTO: 100 FL (ref 80–100)
NRBC BLD-RTO: 0 /100 WBCS (ref 0–0)
PLATELET # BLD AUTO: 239 X10*3/UL (ref 150–450)
POTASSIUM SERPL-SCNC: 4.5 MMOL/L (ref 3.5–5.3)
RBC # BLD AUTO: 3.55 X10*6/UL (ref 4–5.2)
SODIUM SERPL-SCNC: 138 MMOL/L (ref 136–145)
WBC # BLD AUTO: 8.1 X10*3/UL (ref 4.4–11.3)

## 2024-11-29 PROCEDURE — 85027 COMPLETE CBC AUTOMATED: CPT

## 2024-11-29 PROCEDURE — 36415 COLL VENOUS BLD VENIPUNCTURE: CPT

## 2024-11-29 PROCEDURE — 80048 BASIC METABOLIC PNL TOTAL CA: CPT

## 2024-12-05 ENCOUNTER — APPOINTMENT (OUTPATIENT)
Dept: CARDIOLOGY | Facility: CLINIC | Age: 69
End: 2024-12-05
Payer: MEDICARE

## 2025-01-02 ENCOUNTER — OFFICE VISIT (OUTPATIENT)
Dept: PRIMARY CARE | Facility: CLINIC | Age: 70
End: 2025-01-02
Payer: MEDICARE

## 2025-01-02 VITALS
HEIGHT: 62 IN | BODY MASS INDEX: 17.3 KG/M2 | OXYGEN SATURATION: 98 % | HEART RATE: 70 BPM | DIASTOLIC BLOOD PRESSURE: 71 MMHG | SYSTOLIC BLOOD PRESSURE: 146 MMHG | RESPIRATION RATE: 19 BRPM | TEMPERATURE: 98.2 F | WEIGHT: 94 LBS

## 2025-01-02 DIAGNOSIS — J01.90 ACUTE BACTERIAL SINUSITIS: Primary | ICD-10-CM

## 2025-01-02 DIAGNOSIS — B96.89 ACUTE BACTERIAL SINUSITIS: Primary | ICD-10-CM

## 2025-01-02 PROCEDURE — 99214 OFFICE O/P EST MOD 30 MIN: CPT | Performed by: NURSE PRACTITIONER

## 2025-01-02 RX ORDER — DOXYCYCLINE 100 MG/1
100 CAPSULE ORAL 2 TIMES DAILY
Qty: 14 CAPSULE | Refills: 0 | Status: SHIPPED | OUTPATIENT
Start: 2025-01-02 | End: 2025-01-09

## 2025-01-02 ASSESSMENT — ENCOUNTER SYMPTOMS
SORE THROAT: 1
FEVER: 0
COUGH: 1
RHINORRHEA: 1
WHEEZING: 1
HEADACHES: 1

## 2025-01-02 NOTE — PROGRESS NOTES
"David Joy \"Chavez\" is a 69 y.o. female who presents for Cough.    Cough  This is a recurrent problem. The current episode started in the past 7 days. The problem has been gradually worsening. The problem occurs every few minutes. The cough is Productive of sputum. Associated symptoms include chest pain, ear pain, headaches, nasal congestion, postnasal drip, rhinorrhea, a sore throat and wheezing. Pertinent negatives include no fever. Associated symptoms comments: Chest pain when coughing. The symptoms are aggravated by cold air and lying down. Risk factors for lung disease include smoking/tobacco exposure.     Review of Systems   Constitutional:  Negative for fever.   HENT:  Positive for ear pain, postnasal drip, rhinorrhea and sore throat.    Respiratory:  Positive for cough and wheezing.    Cardiovascular:  Positive for chest pain.   Neurological:  Positive for headaches.   Objective     Physical Exam  Vitals reviewed.   Constitutional:       Appearance: Normal appearance.   HENT:      Head: Normocephalic.      Right Ear: Tympanic membrane normal.      Left Ear: Tympanic membrane normal.      Nose: Congestion present.      Mouth/Throat:      Mouth: Mucous membranes are moist.      Pharynx: Posterior oropharyngeal erythema present.   Eyes:      Conjunctiva/sclera: Conjunctivae normal.   Cardiovascular:      Rate and Rhythm: Normal rate and regular rhythm.      Pulses: Normal pulses.      Heart sounds: No murmur heard.  Pulmonary:      Effort: Pulmonary effort is normal. No respiratory distress.      Breath sounds: Normal breath sounds. No wheezing.   Abdominal:      General: Bowel sounds are normal.      Palpations: Abdomen is soft.   Musculoskeletal:      Cervical back: Neck supple.   Skin:     General: Skin is warm and dry.   Neurological:      General: No focal deficit present.      Mental Status: She is alert and oriented to person, place, and time.   Psychiatric:         Mood and Affect: " "Mood normal.         Thought Content: Thought content normal.       /71   Pulse 70   Temp 36.8 °C (98.2 °F)   Resp 19   Ht 1.575 m (5' 2\")   Wt (!) 42.6 kg (94 lb)   SpO2 98%   BMI 17.19 kg/m²     Assessment/Plan     Problem List Items Addressed This Visit    None  Visit Diagnoses       Acute bacterial sinusitis    -  Primary    Relevant Medications    doxycycline (Vibramycin) 100 mg capsule        Increase oral fluids/water, at least eight 8-oz glasses/day.  Get plenty of rest.  Cepacol lozenges and/or Chloraseptic spray PRN for sore throat. Salt water gargle or broth for comfort.  Alternate Tylenol/Ibuprofen PRN for body aches and/or fever  Saline nasal spray PRN for rhinitis.  Guaifenessin/Guaifenissin DM PRN for cough  Flonase nasal spray.      "

## 2025-01-08 ENCOUNTER — TELEPHONE (OUTPATIENT)
Dept: PRIMARY CARE | Facility: CLINIC | Age: 70
End: 2025-01-08
Payer: MEDICARE

## 2025-01-08 NOTE — TELEPHONE ENCOUNTER
PT is having a reaction to...    doxycycline (Vibramycin) 100 mg capsule   can Anika prescribe something else?    Thank you

## 2025-01-10 DIAGNOSIS — J01.90 ACUTE BACTERIAL SINUSITIS: Primary | ICD-10-CM

## 2025-01-10 DIAGNOSIS — B96.89 ACUTE BACTERIAL SINUSITIS: Primary | ICD-10-CM

## 2025-01-10 RX ORDER — AZITHROMYCIN 250 MG/1
TABLET, FILM COATED ORAL
Qty: 6 TABLET | Refills: 0 | Status: SHIPPED | OUTPATIENT
Start: 2025-01-10 | End: 2025-01-15

## 2025-01-10 NOTE — TELEPHONE ENCOUNTER
Called and spoke to patient to see if this has been addressed as Anika has been out of the office since 01/08/2025, patient has not received a call back or update regarding this.     Patient states reactions inlcuded : nausea, diarrhea, and syncope attack on Wednesday. She has not taken the medication since then.    Patient has allergies to : Amoxicillins and Clarithromycin family.     Is there another medication that can be sent in for this patient?  To [facility] in [place].

## 2025-01-16 ENCOUNTER — APPOINTMENT (OUTPATIENT)
Dept: CARDIOLOGY | Facility: CLINIC | Age: 70
End: 2025-01-16
Payer: MEDICARE

## 2025-01-16 VITALS
OXYGEN SATURATION: 99 % | DIASTOLIC BLOOD PRESSURE: 76 MMHG | HEIGHT: 62 IN | WEIGHT: 96 LBS | HEART RATE: 85 BPM | SYSTOLIC BLOOD PRESSURE: 136 MMHG | BODY MASS INDEX: 17.66 KG/M2

## 2025-01-16 DIAGNOSIS — G43.909 MIGRAINE WITHOUT STATUS MIGRAINOSUS, NOT INTRACTABLE, UNSPECIFIED MIGRAINE TYPE: ICD-10-CM

## 2025-01-16 DIAGNOSIS — R55 NEUROGENIC SYNCOPE: ICD-10-CM

## 2025-01-16 DIAGNOSIS — E78.2 MIXED HYPERLIPIDEMIA: ICD-10-CM

## 2025-01-16 DIAGNOSIS — I47.19 PAT (PAROXYSMAL ATRIAL TACHYCARDIA) (CMS-HCC): ICD-10-CM

## 2025-01-16 DIAGNOSIS — R94.30 ABNORMAL CARDIAC FUNCTION TEST: ICD-10-CM

## 2025-01-16 DIAGNOSIS — R55 SYNCOPE AND COLLAPSE: ICD-10-CM

## 2025-01-16 DIAGNOSIS — Z82.49 FAMILY HISTORY OF ARRHYTHMIA: ICD-10-CM

## 2025-01-16 DIAGNOSIS — M06.9 RHEUMATOID ARTHRITIS, INVOLVING UNSPECIFIED SITE, UNSPECIFIED WHETHER RHEUMATOID FACTOR PRESENT (MULTI): Primary | ICD-10-CM

## 2025-01-16 DIAGNOSIS — M81.0 OSTEOPOROSIS, UNSPECIFIED OSTEOPOROSIS TYPE, UNSPECIFIED PATHOLOGICAL FRACTURE PRESENCE: ICD-10-CM
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
## 2025-01-16 DIAGNOSIS — R94.09 ABNORMAL TILT TABLE TEST: ICD-10-CM

## 2025-01-16 DIAGNOSIS — Z88.0 ALLERGY TO PENICILLIN: ICD-10-CM

## 2025-01-16 DIAGNOSIS — R73.03 PRE-DIABETES: ICD-10-CM

## 2025-01-16 DIAGNOSIS — F41.9 ANXIETY: ICD-10-CM

## 2025-01-16 DIAGNOSIS — R42 LIGHTHEADEDNESS: ICD-10-CM

## 2025-01-16 DIAGNOSIS — Z90.49 S/P APPY: ICD-10-CM

## 2025-01-16 DIAGNOSIS — K21.9 GASTROESOPHAGEAL REFLUX DISEASE WITHOUT ESOPHAGITIS: ICD-10-CM

## 2025-01-16 DIAGNOSIS — Z72.0 TOBACCO ABUSE: ICD-10-CM

## 2025-01-16 DIAGNOSIS — R94.31 ABNORMAL EKG: ICD-10-CM

## 2025-01-16 DIAGNOSIS — Z82.49 FAMILY HISTORY OF EARLY CAD: ICD-10-CM

## 2025-01-16 DIAGNOSIS — R00.0 SINUS TACHYCARDIA: ICD-10-CM

## 2025-01-16 DIAGNOSIS — I10 PRIMARY HYPERTENSION: ICD-10-CM

## 2025-01-16 DIAGNOSIS — I10 ESSENTIAL HYPERTENSION: ICD-10-CM

## 2025-01-16 DIAGNOSIS — R00.2 PALPITATIONS: ICD-10-CM

## 2025-01-16 DIAGNOSIS — R55 VASODEPRESSOR SYNCOPE: ICD-10-CM

## 2025-01-16 PROCEDURE — 1159F MED LIST DOCD IN RCRD: CPT | Performed by: INTERNAL MEDICINE

## 2025-01-16 PROCEDURE — 99214 OFFICE O/P EST MOD 30 MIN: CPT | Performed by: INTERNAL MEDICINE

## 2025-01-16 PROCEDURE — 1157F ADVNC CARE PLAN IN RCRD: CPT | Performed by: INTERNAL MEDICINE

## 2025-01-16 PROCEDURE — 3075F SYST BP GE 130 - 139MM HG: CPT | Performed by: INTERNAL MEDICINE

## 2025-01-16 PROCEDURE — 3008F BODY MASS INDEX DOCD: CPT | Performed by: INTERNAL MEDICINE

## 2025-01-16 PROCEDURE — 1126F AMNT PAIN NOTED NONE PRSNT: CPT | Performed by: INTERNAL MEDICINE

## 2025-01-16 PROCEDURE — 3078F DIAST BP <80 MM HG: CPT | Performed by: INTERNAL MEDICINE

## 2025-01-16 RX ORDER — LANOLIN ALCOHOL/MO/W.PET/CERES
3000 CREAM (GRAM) TOPICAL DAILY
COMMUNITY

## 2025-01-16 RX ORDER — METOPROLOL SUCCINATE 50 MG/1
50 TABLET, EXTENDED RELEASE ORAL 2 TIMES DAILY
Qty: 180 TABLET | Refills: 3 | Status: SHIPPED | OUTPATIENT
Start: 2025-01-16 | End: 2026-01-16

## 2025-01-16 RX ORDER — LOSARTAN POTASSIUM 25 MG/1
25 TABLET ORAL 2 TIMES DAILY
Qty: 180 TABLET | Refills: 3 | Status: SHIPPED | OUTPATIENT
Start: 2025-01-16 | End: 2026-01-16

## 2025-01-16 RX ORDER — CALCIUM CARBONATE 300MG(750)
400 TABLET,CHEWABLE ORAL DAILY
Qty: 90 TABLET | Refills: 3 | Status: SHIPPED | OUTPATIENT
Start: 2025-01-16 | End: 2026-01-16

## 2025-01-16 ASSESSMENT — PAIN SCALES - GENERAL: PAINLEVEL_OUTOF10: 0-NO PAIN

## 2025-01-16 NOTE — PROGRESS NOTES
CARDIOLOGY OFFICE NOTE     Date:   1/16/2025    Patient:    Sang Joy    YOB: 1955    Primary Physician: KERRY Prabhakar-CNP       Reason for Visit: cardiology follow-up       HPI:     Sang Joy was seen in cardiac evaluation at the  Cardiology office January 16, 2025.      The patients problems are listed as in the impression below.    Electronic medical records reviewed.    Patient continues to do well overall.  No new complaints general.    Unfortunately she lost her mother of 87 years recently.  Otherwise she is doing well without issues.    Patient denies significant Chest Pain, SOB, Lightheadedness, Dizziness, TIA or CVA symptoms.  No CHF or Edema.  Rare palpitations.  No GI,  or Bleeding Issues. No Recent Fever or Chills.     Cardiovascular and general review of systems is otherwise negative.    A 14-system review is otherwise negative, other than noted.     PHYSICAL EXAMINATION:      Vitals:    01/16/25 1614   BP: 136/76   Pulse:    SpO2:      General: No acute distress. Alert and oriented.  Head And Neck Examination: No jugular venous distention, no carotid bruits, no mass. Carotid upstrokes preserved. Oral mucosa moist.  No xanthelasma. Head and neck examination otherwise unremarkable.  Lungs: Clear to auscultation and percussion. No wheezes, no rales,  and no rhonchi.  Chest: Excursion appeared to be normal. No chest wall tenderness on palpation.  Heart: Normal S1 and S2. No S3. No S4. No rub. Grade 1/6 systolic murmur, best heard at the left sternal border. Point of maximal impulse was within normal limits.  Abdomen: Soft. Nontender. No organomegaly. No bruits. No masses.    Extremities: No bipedal edema. No clubbing. No cyanosis.  Pulses are strong throughout. No bruits.  Musculoskeletal Exam: No ulcers, otherwise unremarkable.  Neuro: Neurologically appeared grossly intact.     IMPRESSION:       Cardiovascular status stable  Chest pain, resolved  Falls,  resolved  Syncope, resolved  Sinus tachycardia, resolved  Lightheadedness, resolved  Palpitations, improved  Migraine headaches  Vasodepressive syncope / neurally mediated syncope, positive tilt table study 9/2024.  Paroxysmal atrial tachycardia, 48-hour Holter monitor 8/2024.  Atrial and ventricular premature contractions, rare.  LV systolic function, EF 60%, echocardiogram 3/2024  Negative Lexiscan Myoview perfusion stress test, 8/2024.  Hypertension  Hyperlipidemia  Hypomagnesemia.  GERD   Rheumatoid arthritis  Osteopenia  Prior appendectomy  Tobacco abuse  Penicillin allergy  Family history of coronary artery disease and atrial fibrillation  Otherwise as per assessment below.    RECOMMENDATIONS:      Patient continues to do well overall.  Would suggest continuing her current medications.  Refills were provided.    Exercise dietary program.    Hydration was encouraged.    [employer] portal use was encouraged.    We will plan to see back in 6 months with Laboratory Studies and ECG as ordered.     Patient will follow up with their primary physician for general care.    The patient knows to contact medical care earlier if need be.      ALLERGIES:     Allergies   Allergen Reactions    Clarithromycin Shortness of breath    Garlic Diarrhea    Influenza Virus Vaccines Anaphylaxis    Iodine Hives    Penicillins Anaphylaxis    Acetaminophen Other     Cannot take while on Plaquenil    Codeine Nausea/vomiting    Doxycycline Syncope     Near syncope. Occurred 3.5 days into a 7 day course of doxy. She had N/V/D once she started the doxy, then had the near-syncopal. Became lightheaded, hot, then started to go down. States her  caught her.     Tetanus Toxoid, Adsorbed Other    Amoxicillin Rash        MEDICATIONS:     Current Outpatient Medications   Medication Instructions    albuterol (ProAir HFA) 90 mcg/actuation inhaler 2 puffs, inhalation, Every 6 hours PRN    b complex-vitamin c tablet Daily    calcium carbonate 600 mg  calcium (1,500 mg) tablet 1 tablet, Daily    calcium-vitamin D3-vitamin K (Viactiv) 650 mg-12.5 mcg-40 mcg chewable tablet 2 tablets, Daily    cyanocobalamin (VITAMIN B-12) 3,000 mcg, Daily    fluticasone (Flonase Allergy Relief) 50 mcg/actuation nasal spray 1 sprays, Nasal, DAILY, in each nostril, Refill(s) 0    furosemide (LASIX) 40 mg, Daily PRN    krill oil 500 mg capsule Daily    losartan (COZAAR) 25 mg, oral, 2 times daily    magnesium oxide (MAG-OX) 400 mg, oral, Daily    meclizine (ANTIVERT) 12.5 mg, oral, 3 times daily PRN    metoprolol succinate XL (TOPROL-XL) 50 mg, oral, 2 times daily, Do not crush or chew.    multivitamin capsule 1 capsule, Daily    omeprazole OTC (PRILOSEC OTC) 20 mg, Daily before breakfast    POTASSIUM GLUCONATE ORAL 90 mg, Daily       ELECTROCARDIOGRAM:      None this visit    CARDIAC TESTING:      None this visit,    LABORATORY DATA:      CBC:   Lab Results   Component Value Date    WBC 8.1 11/29/2024    RBC 3.55 (L) 11/29/2024    HGB 11.9 (L) 11/29/2024    HCT 35.5 (L) 11/29/2024     11/29/2024        CMP:    Lab Results   Component Value Date     11/29/2024    K 4.5 11/29/2024     11/29/2024    CO2 25 11/29/2024    BUN 18 11/29/2024    CREATININE 0.97 11/29/2024    GLUCOSE 96 11/29/2024    CALCIUM 9.5 11/29/2024       Magnesium:    Lab Results   Component Value Date    MG 1.50 (L) 08/28/2024       Lipid Profile:    Lab Results   Component Value Date    CHOL 216 (H) 08/28/2024    TRIG 109 08/28/2024    .5 08/28/2024       Hepatic Function Panel:    Lab Results   Component Value Date    ALKPHOS 90 08/28/2024    ALT 21 08/28/2024    AST 33 08/28/2024    PROT 6.3 (L) 08/28/2024    BILITOT 0.7 08/28/2024    BILIDIR 0.2 08/28/2024       TSH:    Lab Results   Component Value Date    TSH 1.34 08/28/2024       HgBA1c:    Lab Results   Component Value Date    HGBA1C 5.8 (H) 03/09/2024       BNP:   Lab Results   Component Value Date     (H) 03/09/2024         PT/INR:    Lab Results   Component Value Date    PROTIME 10.3 03/09/2024    INR 0.9 03/09/2024       Cardiac Enzymes:    Lab Results   Component Value Date    TROPHS 5 03/09/2024    TROPHS 7 01/29/2024    TROPHS 7 01/29/2024       DIAGNOSTIC.:     Tilt Table    Result Date: 9/9/2024  Tilt Table Study DOS: 9/09/24 Date I received for dictation: 09/09/24 Summary: Abnormal Tilt table testing Vasodepressor response to head up tilt testing. Recommendations: The patient should continue with the present medications. Discharge: Patient was discharged from procedure room after vital signs returned to baseline. Follow up: Follow up with PCP or cardiology office in six weeks. Procedures: Tilt table test. Patient history: Health history was acquired from the patient's chart and the patient. Signs/symptoms include lightheadedness and syncope. Procedure narrative: The risks, benefits, and alternatives to the procedure were explained to the patient, and informed consent was obtained. The patient was in the fasting state. A baseline ECG was recorded. Self-adhesive anterior-posterior defibrillation pads were applied. A ZOLL defibrillator was used for monitoring. The patient was set up for continuous monitoring of surface 12 lead ECG. Blood pressure was monitored with automatic cuff measurements. The peripheral vein was obtained for intravenous fluid administration. The patient was placed on a tilt table, and soft protection belts applied for patient safety. Vital signs were monitored and documented the first 15 minutes in the supine position for baseline readings. Bp 140/70,  bpm, sius tachycardia.Upon completion of 15 minutes, the table was tilted to with a table angle of 70°, and vital signs were recorded every one minute for 20 minutes. The patient was instructed to report any symptoms during the procedure. The patient remained asymptomatic and the table placed in the supine position. Nitroglycerin 0.3 mg was given  sublingually. Again, the table was tilted to 70 degrees. Vital signs were monitored and documented every one minute and the patient instructed to report any symptoms. The patient was symptomatic with lightheadedness, near syncope, and warmth at 6 minutes after nitroglycerin. The patient was able to stand for 19 minutes (minimal BP 52/38 with  , Rhythm sinus tachycardia), and returned to the supine position until fully recovered. IV fluids were administered. The peripheral IV was discontinued once vital signs normalized. See signed procedural log and parameters. Complications: The patient tolerated the procedure without any complications or incident.             PROBLEM LIST:     Patient Active Problem List   Diagnosis    Rheumatoid arthritis    Fall    Primary hypertension    Tobacco abuse    Neurogenic syncope    Electrolyte depletion    Acute on chronic anemia    Hypotension due to drugs    Pre-diabetes    Hyponatremia    Hypomagnesemia    Osteopenia of multiple sites    Centrilobular emphysema (Multi)    Pulmonary nodules    Osteoporosis    Lightheadedness    Family history of arrhythmia    Allergy to penicillin    Mixed hyperlipidemia    Anxiety    Migraine without status migrainosus, not intractable    Gastroesophageal reflux disease without esophagitis    S/P appy    Sinus tachycardia    Abnormal EKG    Palpitations    PAT (paroxysmal atrial tachycardia) (CMS-[facility])    Abnormal cardiac function test    Abnormal tilt table test             Titi Monroy MD, FACC   [facility] / [facility] /  Cardiology      Of Note:  View[employer] voice recognition dictation software was utilized partially in the preparation of this note, therefore, inaccuracies in spelling, word choice and punctuation may have occurred which were not recognized the time of signing.    Patient was seen and examined with total time of visit including chart preparation, rooming, and chart completion exceeding 40 minutes.      ----

## 2025-02-13 ENCOUNTER — TELEPHONE (OUTPATIENT)
Dept: PRIMARY CARE | Facility: CLINIC | Age: 70
End: 2025-02-13
Payer: MEDICARE

## 2025-02-13 NOTE — TELEPHONE ENCOUNTER
Patient is scheduled for a Medicare Wellness Exam on 03/10/2025 with you. She states she has many lab orders from other providers to complete. Will she need additional labs for her appointment with you, or will the labs ordered be sufficient for her AMW.

## 2025-02-15 DIAGNOSIS — E43 SEVERE PROTEIN-CALORIE MALNUTRITION (MULTI): Primary | ICD-10-CM

## 2025-02-15 DIAGNOSIS — M81.0 AGE-RELATED OSTEOPOROSIS WITHOUT CURRENT PATHOLOGICAL FRACTURE: ICD-10-CM

## 2025-02-25 LAB
25(OH)D3+25(OH)D2 SERPL-MCNC: 45 NG/ML (ref 30–100)
VIT B12 SERPL-MCNC: 1249 PG/ML (ref 200–1100)

## 2025-02-27 RX ORDER — LANOLIN ALCOHOL/MO/W.PET/CERES
1000 CREAM (GRAM) TOPICAL DAILY
Status: SHIPPED
Start: 2025-02-27

## 2025-03-05 ENCOUNTER — APPOINTMENT (OUTPATIENT)
Dept: RHEUMATOLOGY | Facility: CLINIC | Age: 70
End: 2025-03-05
Payer: MEDICARE

## 2025-03-05 VITALS
TEMPERATURE: 97.1 F | DIASTOLIC BLOOD PRESSURE: 63 MMHG | HEART RATE: 94 BPM | HEIGHT: 62 IN | SYSTOLIC BLOOD PRESSURE: 117 MMHG | WEIGHT: 91 LBS | BODY MASS INDEX: 16.75 KG/M2

## 2025-03-05 DIAGNOSIS — M05.742 RHEUMATOID ARTHRITIS INVOLVING BOTH HANDS WITH POSITIVE RHEUMATOID FACTOR (MULTI): Primary | ICD-10-CM

## 2025-03-05 DIAGNOSIS — M05.741 RHEUMATOID ARTHRITIS INVOLVING BOTH HANDS WITH POSITIVE RHEUMATOID FACTOR (MULTI): Primary | ICD-10-CM

## 2025-03-05 PROCEDURE — 3008F BODY MASS INDEX DOCD: CPT | Performed by: INTERNAL MEDICINE

## 2025-03-05 PROCEDURE — 1125F AMNT PAIN NOTED PAIN PRSNT: CPT | Performed by: INTERNAL MEDICINE

## 2025-03-05 PROCEDURE — 1157F ADVNC CARE PLAN IN RCRD: CPT | Performed by: INTERNAL MEDICINE

## 2025-03-05 PROCEDURE — 99214 OFFICE O/P EST MOD 30 MIN: CPT | Performed by: INTERNAL MEDICINE

## 2025-03-05 PROCEDURE — 1160F RVW MEDS BY RX/DR IN RCRD: CPT | Performed by: INTERNAL MEDICINE

## 2025-03-05 PROCEDURE — 1159F MED LIST DOCD IN RCRD: CPT | Performed by: INTERNAL MEDICINE

## 2025-03-05 PROCEDURE — 3078F DIAST BP <80 MM HG: CPT | Performed by: INTERNAL MEDICINE

## 2025-03-05 PROCEDURE — 3074F SYST BP LT 130 MM HG: CPT | Performed by: INTERNAL MEDICINE

## 2025-03-05 RX ORDER — HYDROXYCHLOROQUINE SULFATE 200 MG/1
TABLET, FILM COATED ORAL
Qty: 36 TABLET | Refills: 3 | Status: SHIPPED | OUTPATIENT
Start: 2025-03-05

## 2025-03-05 RX ORDER — LORATADINE 10 MG/1
10 TABLET ORAL DAILY
COMMUNITY

## 2025-03-05 ASSESSMENT — PAIN SCALES - GENERAL: PAINLEVEL_OUTOF10: 3

## 2025-03-05 NOTE — PROGRESS NOTES
She complains of generalized musculoskeletal pain that has become more significant since she has been off Plaquenil therapy.  The Plaquenil therapy had been discontinued because of syncope and lightheadedness.  She has been evaluated by cardiology for supraventricular tachycardia.  She was placed on losartan and metoprolol.  She has noted some mild swelling in her fingers.  Her mother recently  in 2024 at 89 years of age.  She contracted COVID in 10/2024 visiting her mother.  Afterwards she developed a sinus infection.    She has a thin body habitus.  The lungs are clear to auscultation.  The heart has a regular rhythm with normal heart sounds.  The abdomen is benign.  Extremities are without edema.  The musculoskeletal examination does not show any joint effusions.  There is slight puffiness in the proximal aspect of the digits of both hands.  There are no digital ulcers.    DEXA bone density (4/10/2024) L1-L4 T-score -1.5, left total femur T-score -2.2, left femoral neck T-score -2.0.    Laboratory (2025) vitamin B12 1249, 25-hydroxy vitamin D 45.    She has history of vitamin B12 deficiency on vitamin B12 supplements.  She has history of syncope, supraventricular tachycardia, asthma, cervical and lumbar spondylosis, osteopenia, rheumatoid arthritis.    She is to resume hydroxychloroquine at 200 mg  and Friday with food.  She is to have laboratory for anti-CCP and CRP with her next blood test.  She is to return at the next available office appointment.

## 2025-03-06 ENCOUNTER — APPOINTMENT (OUTPATIENT)
Dept: CARDIOLOGY | Facility: CLINIC | Age: 70
End: 2025-03-06
Payer: MEDICARE

## 2025-03-10 ENCOUNTER — APPOINTMENT (OUTPATIENT)
Dept: PRIMARY CARE | Facility: CLINIC | Age: 70
End: 2025-03-10
Payer: MEDICARE

## 2025-03-10 VITALS
SYSTOLIC BLOOD PRESSURE: 144 MMHG | HEIGHT: 62 IN | TEMPERATURE: 97.3 F | WEIGHT: 92 LBS | BODY MASS INDEX: 16.93 KG/M2 | OXYGEN SATURATION: 98 % | HEART RATE: 96 BPM | DIASTOLIC BLOOD PRESSURE: 78 MMHG | RESPIRATION RATE: 16 BRPM

## 2025-03-10 DIAGNOSIS — M06.9 RHEUMATOID ARTHRITIS, INVOLVING UNSPECIFIED SITE, UNSPECIFIED WHETHER RHEUMATOID FACTOR PRESENT (MULTI): ICD-10-CM

## 2025-03-10 DIAGNOSIS — R91.8 PULMONARY NODULES: ICD-10-CM

## 2025-03-10 DIAGNOSIS — Z87.891 PERSONAL HISTORY OF TOBACCO USE, PRESENTING HAZARDS TO HEALTH: ICD-10-CM

## 2025-03-10 DIAGNOSIS — J43.2 CENTRILOBULAR EMPHYSEMA (MULTI): ICD-10-CM

## 2025-03-10 DIAGNOSIS — Z12.31 ENCOUNTER FOR SCREENING MAMMOGRAM FOR BREAST CANCER: ICD-10-CM

## 2025-03-10 DIAGNOSIS — Z00.00 ROUTINE GENERAL MEDICAL EXAMINATION AT HEALTH CARE FACILITY: Primary | ICD-10-CM

## 2025-03-10 DIAGNOSIS — M85.89 OSTEOPENIA OF MULTIPLE SITES: ICD-10-CM

## 2025-03-10 PROBLEM — M81.0 OSTEOPOROSIS: Status: RESOLVED | Noted: 2024-08-01 | Resolved: 2025-03-10

## 2025-03-10 PROBLEM — D64.9 ACUTE ON CHRONIC ANEMIA: Status: RESOLVED | Noted: 2024-03-10 | Resolved: 2025-03-10

## 2025-03-10 PROCEDURE — G0439 PPPS, SUBSEQ VISIT: HCPCS | Performed by: NURSE PRACTITIONER

## 2025-03-10 RX ORDER — ALBUTEROL SULFATE 90 UG/1
2 INHALANT RESPIRATORY (INHALATION) EVERY 6 HOURS PRN
Qty: 18 G | Refills: 2 | Status: SHIPPED | OUTPATIENT
Start: 2025-03-10

## 2025-03-10 ASSESSMENT — ENCOUNTER SYMPTOMS
NERVOUS/ANXIOUS: 0
LIGHT-HEADEDNESS: 0
COUGH: 1
SHORTNESS OF BREATH: 1
NUMBNESS: 0
HEADACHES: 0
SLEEP DISTURBANCE: 0
NAUSEA: 0
BLOOD IN STOOL: 0
WHEEZING: 1
PALPITATIONS: 0
DYSPHORIC MOOD: 0
VOMITING: 0
APPETITE CHANGE: 0
ABDOMINAL PAIN: 0
FEVER: 0
CONSTIPATION: 0
CHILLS: 0
WEAKNESS: 0
UNEXPECTED WEIGHT CHANGE: 0
DIARRHEA: 0
FATIGUE: 0

## 2025-03-10 ASSESSMENT — ACTIVITIES OF DAILY LIVING (ADL)
GROCERY_SHOPPING: INDEPENDENT
MANAGING_FINANCES: INDEPENDENT
DRESSING: INDEPENDENT
DOING_HOUSEWORK: INDEPENDENT
BATHING: INDEPENDENT
TAKING_MEDICATION: INDEPENDENT

## 2025-03-10 ASSESSMENT — PATIENT HEALTH QUESTIONNAIRE - PHQ9
2. FEELING DOWN, DEPRESSED OR HOPELESS: NOT AT ALL
SUM OF ALL RESPONSES TO PHQ9 QUESTIONS 1 AND 2: 0
1. LITTLE INTEREST OR PLEASURE IN DOING THINGS: NOT AT ALL

## 2025-03-10 NOTE — ASSESSMENT & PLAN NOTE
States she is using rescue inhaler BID, worse recently as trees starting to bud. Recommend she start taking a controller inhaler. She does not want to do that yet. We will repeat her LDCT.    Orders:    albuterol (ProAir HFA) 90 mcg/actuation inhaler; Inhale 2 puffs every 6 hours if needed for wheezing.

## 2025-03-10 NOTE — PROGRESS NOTES
"Subjective   Reason for Visit: Sang Joy is an 69 y.o. female here for a Medicare Wellness visit.     Past Medical, Surgical, and Family History reviewed and updated in chart.    Reviewed all medications by prescribing practitioner or clinical pharmacist (such as prescriptions, OTCs, herbal therapies and supplements) and documented in the medical record.    HPI  She is considering getting the next COVID-19 vaccine at her pharmacy. She did have COVIDE-19 illness 10/2024.   She has not had shingles vaccines, but is considering it.   Patient Care Team:  AHMET Prabhakar as PCP - General (Gerontology)  AH[facility]   Dr. Titi Monroy: Cardiology  Dr. Allison: Rheumatology  Review of Systems   Constitutional:  Negative for appetite change, chills, fatigue, fever and unexpected weight change.   Respiratory:  Positive for cough (chronic since covid in 10/2024.), shortness of breath and wheezing.    Cardiovascular:  Negative for chest pain, palpitations and leg swelling.   Gastrointestinal:  Negative for abdominal pain, blood in stool, constipation, diarrhea, nausea and vomiting.   Neurological:  Negative for weakness, light-headedness, numbness and headaches.   Psychiatric/Behavioral:  Negative for dysphoric mood and sleep disturbance. The patient is not nervous/anxious.    Objective   Vitals:  /78   Pulse 96   Temp 36.3 °C (97.3 °F)   Resp 16   Ht 1.575 m (5' 2\")   Wt (!) 41.7 kg (92 lb)   SpO2 98%   BMI 16.83 kg/m²       Physical Exam  Vitals reviewed.   Constitutional:       General: She is not in acute distress.     Appearance: Normal appearance. She is underweight. She is not ill-appearing.   HENT:      Head: Normocephalic.   Eyes:      Conjunctiva/sclera: Conjunctivae normal.   Neck:      Thyroid: No thyroid mass, thyromegaly or thyroid tenderness.   Cardiovascular:      Rate and Rhythm: Normal rate and regular rhythm.      " Pulses: Normal pulses.      Heart sounds: No murmur heard.  Pulmonary:      Effort: Pulmonary effort is normal.      Breath sounds: Normal breath sounds.   Abdominal:      General: Bowel sounds are normal.      Palpations: Abdomen is soft.   Musculoskeletal:      Cervical back: Neck supple.      Right lower leg: No edema.      Left lower leg: No edema.   Lymphadenopathy:      Cervical: No cervical adenopathy.   Skin:     General: Skin is warm and dry.   Neurological:      General: No focal deficit present.      Mental Status: She is alert and oriented to person, place, and time.   Psychiatric:         Mood and Affect: Mood normal.         Thought Content: Thought content normal.         Assessment & Plan  Centrilobular emphysema (Multi)  States she is using rescue inhaler BID, worse recently as trees starting to bud. Recommend she start taking a controller inhaler. She does not want to do that yet. We will repeat her LDCT.    Orders:    albuterol (ProAir HFA) 90 mcg/actuation inhaler; Inhale 2 puffs every 6 hours if needed for wheezing.    Rheumatoid arthritis, involving unspecified site, unspecified whether rheumatoid factor present (Multi)         Pulmonary nodules  Repeat LDCT ordered.       Osteopenia of multiple sites  Continue vit D supplement. Last level was normal.          Routine general medical examination at health care facility    Orders:    1 Year Follow Up In [facility] - PCP - Wellness Exam; Future    Personal history of tobacco use, presenting hazards to health    Orders:    CT lung screening low dose; Future    Encounter for screening mammogram for breast cancer    Orders:    BI mammo bilateral screening tomosynthesis; Future

## 2025-03-17 ENCOUNTER — OFFICE VISIT (OUTPATIENT)
Dept: PRIMARY CARE | Facility: CLINIC | Age: 70
End: 2025-03-17
Payer: MEDICARE

## 2025-03-17 ENCOUNTER — HOSPITAL ENCOUNTER (OUTPATIENT)
Dept: RADIOLOGY | Facility: CLINIC | Age: 70
Discharge: HOME | End: 2025-03-17
Payer: MEDICARE

## 2025-03-17 VITALS
RESPIRATION RATE: 17 BRPM | OXYGEN SATURATION: 95 % | HEIGHT: 62 IN | BODY MASS INDEX: 16.93 KG/M2 | DIASTOLIC BLOOD PRESSURE: 75 MMHG | HEART RATE: 89 BPM | WEIGHT: 92 LBS | TEMPERATURE: 97.5 F | SYSTOLIC BLOOD PRESSURE: 159 MMHG

## 2025-03-17 DIAGNOSIS — J01.90 ACUTE BACTERIAL SINUSITIS: Primary | ICD-10-CM

## 2025-03-17 DIAGNOSIS — R06.02 SHORTNESS OF BREATH: ICD-10-CM

## 2025-03-17 DIAGNOSIS — R06.2 DIFFUSE WHEEZING: ICD-10-CM

## 2025-03-17 DIAGNOSIS — B96.89 ACUTE BACTERIAL SINUSITIS: Primary | ICD-10-CM

## 2025-03-17 DIAGNOSIS — R09.89 ABNORMAL LUNG SOUNDS: ICD-10-CM

## 2025-03-17 PROCEDURE — 71046 X-RAY EXAM CHEST 2 VIEWS: CPT

## 2025-03-17 PROCEDURE — 71046 X-RAY EXAM CHEST 2 VIEWS: CPT | Performed by: RADIOLOGY

## 2025-03-17 PROCEDURE — 99214 OFFICE O/P EST MOD 30 MIN: CPT | Performed by: NURSE PRACTITIONER

## 2025-03-17 RX ORDER — AZITHROMYCIN 250 MG/1
TABLET, FILM COATED ORAL
Qty: 6 TABLET | Refills: 0 | Status: SHIPPED | OUTPATIENT
Start: 2025-03-17 | End: 2025-03-22

## 2025-03-17 RX ORDER — PREDNISONE 20 MG/1
40 TABLET ORAL DAILY
Qty: 10 TABLET | Refills: 0 | Status: SHIPPED | OUTPATIENT
Start: 2025-03-17 | End: 2025-03-22

## 2025-03-17 ASSESSMENT — ENCOUNTER SYMPTOMS
SORE THROAT: 1
CHILLS: 1
COUGH: 1
RHINORRHEA: 1
WHEEZING: 1
FEVER: 0

## 2025-03-17 NOTE — PROGRESS NOTES
"Subjective   Sang Joy \"Chavez\" is a 69 y.o. female who presents for Cough.    Cough  This is a recurrent problem. Episode onset: 5 days ago. The problem has been rapidly worsening. The problem occurs every few minutes. The cough is Productive of sputum and productive of purulent sputum. Associated symptoms include chest pain, chills, ear congestion, ear pain, nasal congestion, rhinorrhea, a sore throat and wheezing. Pertinent negatives include no fever. Associated symptoms comments: Sinus pressure. The symptoms are aggravated by cold air and lying down. Risk factors for lung disease include smoking/tobacco exposure.     Review of Systems   Constitutional:  Positive for chills. Negative for fever.   HENT:  Positive for ear pain, rhinorrhea and sore throat.    Respiratory:  Positive for cough and wheezing.    Cardiovascular:  Positive for chest pain.   Objective     Physical Exam  Vitals reviewed.   Constitutional:       General: She is not in acute distress.     Appearance: Normal appearance. She is not ill-appearing.   HENT:      Head: Normocephalic.      Right Ear: No tenderness. A middle ear effusion is present. No mastoid tenderness. Tympanic membrane is not erythematous.      Left Ear: Tympanic membrane normal.      Nose: Congestion present.      Right Turbinates: Swollen.      Left Turbinates: Swollen.      Right Sinus: Maxillary sinus tenderness and frontal sinus tenderness present.      Left Sinus: Frontal sinus tenderness present.      Mouth/Throat:      Mouth: Mucous membranes are moist.      Pharynx: Posterior oropharyngeal erythema present.   Eyes:      Conjunctiva/sclera: Conjunctivae normal.   Neck:      Thyroid: No thyroid mass, thyromegaly or thyroid tenderness.   Cardiovascular:      Rate and Rhythm: Normal rate and regular rhythm.      Pulses: Normal pulses.      Heart sounds: No murmur heard.  Pulmonary:      Effort: Pulmonary effort is normal.      Breath sounds: Wheezing and rales present. " "  Abdominal:      General: Bowel sounds are normal.      Palpations: Abdomen is soft.   Musculoskeletal:      Cervical back: Neck supple.      Right lower leg: No edema.      Left lower leg: No edema.   Lymphadenopathy:      Cervical: No cervical adenopathy.   Skin:     General: Skin is warm and dry.   Neurological:      General: No focal deficit present.      Mental Status: She is alert and oriented to person, place, and time.   Psychiatric:         Mood and Affect: Mood normal.         Thought Content: Thought content normal.       /75   Pulse 89   Temp 36.4 °C (97.5 °F)   Resp 17   Ht 1.575 m (5' 2\")   Wt (!) 41.7 kg (92 lb)   SpO2 95%   BMI 16.83 kg/m²     Assessment/Plan     Problem List Items Addressed This Visit    None  Visit Diagnoses       Acute bacterial sinusitis    -  Primary    Relevant Medications    azithromycin (Zithromax) 250 mg tablet    predniSONE (Deltasone) 20 mg tablet    Other Relevant Orders    CBC and Auto Differential    Shortness of breath        Relevant Medications    predniSONE (Deltasone) 20 mg tablet    Other Relevant Orders    XR chest 2 views    CBC and Auto Differential    Abnormal lung sounds        Relevant Medications    azithromycin (Zithromax) 250 mg tablet    predniSONE (Deltasone) 20 mg tablet    Other Relevant Orders    CBC and Auto Differential    Diffuse wheezing        Relevant Medications    predniSONE (Deltasone) 20 mg tablet            "

## 2025-03-18 LAB
BASOPHILS # BLD AUTO: 51 CELLS/UL (ref 0–200)
BASOPHILS NFR BLD AUTO: 0.5 %
EOSINOPHIL # BLD AUTO: 153 CELLS/UL (ref 15–500)
EOSINOPHIL NFR BLD AUTO: 1.5 %
ERYTHROCYTE [DISTWIDTH] IN BLOOD BY AUTOMATED COUNT: 12.7 % (ref 11–15)
HCT VFR BLD AUTO: 36.1 % (ref 35–45)
HGB BLD-MCNC: 12.5 G/DL (ref 11.7–15.5)
LYMPHOCYTES # BLD AUTO: 2591 CELLS/UL (ref 850–3900)
LYMPHOCYTES NFR BLD AUTO: 25.4 %
MCH RBC QN AUTO: 33.7 PG (ref 27–33)
MCHC RBC AUTO-ENTMCNC: 34.6 G/DL (ref 32–36)
MCV RBC AUTO: 97.3 FL (ref 80–100)
MONOCYTES # BLD AUTO: 1071 CELLS/UL (ref 200–950)
MONOCYTES NFR BLD AUTO: 10.5 %
NEUTROPHILS # BLD AUTO: 6334 CELLS/UL (ref 1500–7800)
NEUTROPHILS NFR BLD AUTO: 62.1 %
PLATELET # BLD AUTO: 223 THOUSAND/UL (ref 140–400)
PMV BLD REES-ECKER: 11.8 FL (ref 7.5–12.5)
RBC # BLD AUTO: 3.71 MILLION/UL (ref 3.8–5.1)
WBC # BLD AUTO: 10.2 THOUSAND/UL (ref 3.8–10.8)

## 2025-03-20 ENCOUNTER — TELEPHONE (OUTPATIENT)
Dept: PRIMARY CARE | Facility: CLINIC | Age: 70
End: 2025-03-20
Payer: MEDICARE

## 2025-03-20 DIAGNOSIS — Z72.0 TOBACCO ABUSE: Primary | ICD-10-CM

## 2025-03-20 DIAGNOSIS — R91.8 PULMONARY NODULES: ICD-10-CM

## 2025-03-20 DIAGNOSIS — R05.3 PERSISTENT COUGH FOR 3 WEEKS OR LONGER: ICD-10-CM

## 2025-03-20 DIAGNOSIS — R06.02 SHORTNESS OF BREATH: ICD-10-CM

## 2025-03-20 DIAGNOSIS — J43.2 CENTRILOBULAR EMPHYSEMA (MULTI): ICD-10-CM

## 2025-03-20 NOTE — TELEPHONE ENCOUNTER
Pt still coughing and has one day worth of Zithromax) 250 mg tablet left and 2 days worth of predniSONE. Only a slight improvement.  Please advise   [phone]

## 2025-03-23 DIAGNOSIS — J43.2 CENTRILOBULAR EMPHYSEMA (MULTI): Primary | ICD-10-CM

## 2025-03-23 DIAGNOSIS — F17.200 SMOKES AND MOTIVATED TO QUIT: Primary | ICD-10-CM

## 2025-03-23 RX ORDER — IBUPROFEN 200 MG
1 TABLET ORAL EVERY 24 HOURS
Qty: 42 PATCH | Refills: 0 | Status: SHIPPED | OUTPATIENT
Start: 2025-03-23 | End: 2025-05-04

## 2025-03-23 RX ORDER — NICOTINE 7MG/24HR
1 PATCH, TRANSDERMAL 24 HOURS TRANSDERMAL EVERY 24 HOURS
Qty: 14 PATCH | Refills: 0 | Status: SHIPPED | OUTPATIENT
Start: 2025-05-05 | End: 2025-05-19

## 2025-03-23 RX ORDER — FLUTICASONE FUROATE, UMECLIDINIUM BROMIDE AND VILANTEROL TRIFENATATE 100; 62.5; 25 UG/1; UG/1; UG/1
1 POWDER RESPIRATORY (INHALATION) DAILY
Qty: 90 EACH | Refills: 3 | Status: SHIPPED | OUTPATIENT
Start: 2025-03-23 | End: 2026-03-23

## 2025-03-25 ENCOUNTER — HOSPITAL ENCOUNTER (OUTPATIENT)
Dept: RADIOLOGY | Facility: CLINIC | Age: 70
Discharge: HOME | End: 2025-03-25
Payer: MEDICARE

## 2025-03-25 DIAGNOSIS — Z72.0 TOBACCO ABUSE: ICD-10-CM

## 2025-03-25 DIAGNOSIS — R91.8 PULMONARY NODULES: ICD-10-CM

## 2025-03-25 DIAGNOSIS — R05.3 PERSISTENT COUGH FOR 3 WEEKS OR LONGER: ICD-10-CM

## 2025-03-25 DIAGNOSIS — J43.2 CENTRILOBULAR EMPHYSEMA (MULTI): ICD-10-CM

## 2025-03-25 DIAGNOSIS — R06.02 SHORTNESS OF BREATH: ICD-10-CM

## 2025-03-25 PROCEDURE — 71250 CT THORAX DX C-: CPT | Performed by: RADIOLOGY

## 2025-03-25 PROCEDURE — 71250 CT THORAX DX C-: CPT

## 2025-03-28 ENCOUNTER — CLINICAL SUPPORT (OUTPATIENT)
Dept: CARDIAC REHAB | Facility: CLINIC | Age: 70
End: 2025-03-28
Payer: MEDICARE

## 2025-03-28 DIAGNOSIS — F17.210 CIGARETTE SMOKER: ICD-10-CM

## 2025-03-28 DIAGNOSIS — F17.200 SMOKES AND MOTIVATED TO QUIT: ICD-10-CM

## 2025-03-28 NOTE — PROGRESS NOTES
Tobacco Treatment Counseling Initial Visit    Name: Sang Joy            MRN: [id number]          YOB: 1955           Age: 69 y.o.                  Today’s Date: 3/28/2025  Primary Care Physician: KERRY Prabhakar-CNP  Referring Physician: Anika Roman APRN-*  Program Location: [address]         Start time: 10:05 AM    End time: 10:40 AM       Sang Joy presents for initial session for Tobacco Treatment Counseling. Patient currently smoking 1/2 PPD (was up to 1.5 PPD but has cut down) and has been smoking since the age of 18. Patient has a moderate dependence on nicotine according to the [employer] nicotine dependence assessment. At this time, patient is motivated to quit smoking due to health concerns. She has been suffering with respiratory issues, COVID, PNA... feels like her frequent illnesses could be due to her smoking. See below for detailed assessment of tobacco use and treatment plan.      Smoking triggers/routines:  after eating, anxiety, talking on the phone    Past quit attempts:  quit in the 80's for 10 years with her  (he remains smoke-free), did use nicotine patches at this time which helped    Potential barriers to quitting:  has been dealing with the loss of her parents recently    Strengths:  high motivation to quit, experience with quitting in the past    Medication plan:  14 mg nicotine patches, discussed adding in short acting NRT if needed    Coping strategies:  stress mgmt (plays games on her computer), distraction techniques, substitutes    Next steps:  has TQD set for 4/1/25... plans to start patches on that day and attempt cessation.. will follow up via phone call on quit day    Follow-up scheduled 4/1/25. Patient to call office at [phone] with any questions/concerns.      Diane Mendes RN

## 2025-04-04 ENCOUNTER — TELEMEDICINE CLINICAL SUPPORT (OUTPATIENT)
Dept: CARDIAC REHAB | Facility: CLINIC | Age: 70
End: 2025-04-04
Payer: MEDICARE

## 2025-04-04 DIAGNOSIS — F17.210 CIGARETTE SMOKER: ICD-10-CM

## 2025-04-04 PROCEDURE — 99406 BEHAV CHNG SMOKING 3-10 MIN: CPT | Performed by: INTERNAL MEDICINE

## 2025-04-07 NOTE — PROGRESS NOTES
" Patient: Sang Joy    [id number]  : 1955 -- AGE 69 y.o.    Provider: Alexandra PAYNE- CNP     [place] [facility] [place]: [place]              [facility]  New Visit Note      HISTORY OF PRESENT ILLNESS     The patient's referring provider is: Anika Roman APRN-*    HISTORY OF PRESENT ILLNESS   Sang Joy \"Saul" is a 69 y.o. female with a history of rheumatoid arthritis, seasonal allergies and syncope, who is a  former smoker (~51 pack years), who presents to a [facility] for an initial evaluation for cough.     I have independently interviewed and examined the patient in the office and reviewed available records.    Current History        HPI:  On today's visit, the patient reports having COVID in October, sinus and respiratory infection in January and pneumonia in March. Has had a persistent cough since covid last October.  She recently quit smoking 25, smoked since she was 17 y/o.  Reports her cough and wheezing has improved with Trelegy inhaler she started a couple weeks ago. She has not needed to use her albuterol inhaler since starting Trelegy daily.  She started Guaifenesin 200 mg tablet 2-6 times a day ordered by her PCP, has been coughing up green to brownish green mucus since.  She completed azithromycin and prednisone burst in mid March ordered by PCP for sinus infection and cough.  Has dyspnea with exertion, not with normal pace of walking.   Takes prilosec daily with good control on most days.  Takes claritin and flonase daily for seasonal allergies.  Denies chest tightness and and ER visits for breathing issues.    Previous pulmonary history: COPD, states she has had asthma since age of 18 (started smoking at this age)    Inhalers/nebulized medications: Trelegy and albuterol    Hospitalization History: She has not been hospitalized over the last year for breathing related " problem.    Sleep history:  Denies snoring, apnea, feeling tired during the day or taking naps during the day.     ALLERGIES AND MEDICATIONS     ALLERGIES  Allergies   Allergen Reactions    Clarithromycin Shortness of breath    Garlic Diarrhea    Influenza Virus Vaccines Anaphylaxis    Iodine Hives    Penicillins Anaphylaxis    Acetaminophen Other     Cannot take while on Plaquenil    Codeine Nausea/vomiting    Doxycycline Syncope     Near syncope. Occurred 3.5 days into a 7 day course of doxy. She had N/V/D once she started the doxy, then had the near-syncopal. Became lightheaded, hot, then started to go down. States her  caught her.     Tetanus Toxoid, Adsorbed Other    Amoxicillin Rash       MEDICATIONS  Current Outpatient Medications   Medication Sig Dispense Refill    albuterol (ProAir HFA) 90 mcg/actuation inhaler Inhale 2 puffs every 6 hours if needed for wheezing. 18 g 2    calcium-vitamin D3-vitamin K (Viactiv) 650 mg-12.5 mcg-40 mcg chewable tablet Chew 2 tablets once daily.      cyanocobalamin (Vitamin B-12) 1,000 mcg tablet Take 1 tablet (1,000 mcg) by mouth once daily.      fluticasone (Flonase Allergy Relief) 50 mcg/actuation nasal spray 1 sprays, Nasal, DAILY, in each nostril, Refill(s) 0      fluticasone-umeclidin-vilanter (Trelegy Ellipta) 100-62.5-25 mcg blister with device Inhale 1 puff once daily. 90 each 3    furosemide (Lasix) 40 mg tablet Take 1 tablet (40 mg) by mouth once daily as needed.      hydroxychloroquine (Plaquenil) 200 mg tablet Take 1 tablet Monday Wednesday and Friday with food 36 tablet 3    krill oil 500 mg capsule Take by mouth once daily.      loratadine (Claritin) 10 mg tablet Take 1 tablet (10 mg) by mouth once daily.      losartan (Cozaar) 25 mg tablet Take 1 tablet (25 mg) by mouth 2 times a day. 180 tablet 3    magnesium oxide (Mag-Ox) 400 mg tablet Take 1 tablet (400 mg) by mouth once daily. 90 tablet 3    meclizine (Antivert) 12.5 mg tablet Take 1 tablet (12.5  mg) by mouth 3 times a day as needed for dizziness. 30 tablet 1    metoprolol succinate XL (Toprol-XL) 50 mg 24 hr tablet Take 1 tablet (50 mg) by mouth 2 times a day. Do not crush or chew. 180 tablet 3    multivitamin capsule 1 capsule once daily.      nicotine (Nicoderm CQ) 14 mg/24 hr patch Place 1 patch over 24 hours on the skin once every 24 hours. 42 patch 0    [START ON 2025] nicotine (Nicoderm CQ) 7 mg/24 hr patch Place 1 patch over 24 hours on the skin once every 24 hours for 14 days. Remove old patch before applying a new one. Do not fill before May 5, 2025. 14 patch 0    omeprazole OTC (PriLOSEC OTC) 20 mg EC tablet 1 tablet (20 mg) once daily in the morning. Take before meals.      POTASSIUM GLUCONATE ORAL Take 90 mg by mouth once daily.       No current facility-administered medications for this visit.         PAST HISTORY     PAST MEDICAL HISTORY  RA - plaquenil  Syncope  Seasonal allergies    PAST SURGICAL HISTORY  Past Surgical History:   Procedure Laterality Date    APPENDECTOMY      DENTAL SURGERY      DILATION AND CURETTAGE OF UTERUS         IMMUNIZATION HISTORY  Immunization History   Administered Date(s) Administered    COVID-19, mRNA, LNP-S, PF, 30 mcg/0.3 mL dose 2021, 2021, 10/03/2021    Influenza, seasonal, injectable 2015    Novel influenza-H1N1-09, preservative-free 02/15/2010    Pfizer COVID-19 vaccine, 12 years and older, (30mcg/0.3mL) (Comirnaty) 10/06/2023    Pfizer COVID-19 vaccine, bivalent, age 12 years and older (30 mcg/0.3 mL) 2023    Pfizer Gray Cap SARS-CoV-2 2022    Pneumococcal conjugate vaccine, 20-valent (PREVNAR 20) 10/12/2023    RESPIRATORY SYNCYTIAL VIRUS (RSV), ELIGIBLE PREGNANT PTS, 0.5 ML (ABRYSVO) 10/12/2023       SOCIAL HISTORY  Tobacco Smokin - 70 y/o- 1/2 ppd more recently was smoking 1-2 ppd - quit / ~51 pack years   Smokeless Tobacco/Vaping: None  Illicit drugs: none  Alcohol consumption: 2 glasses of wine daily  Pets:  none  Living situation: live at your     OCCUPATIONAL/ENVIRONMENTAL HISTORY  Occupation: [employer] and [employer]. 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  No known exposure to asbestos, silica, beryllium or inhaled metals.  Had a Parakeet for 3 years.    FAMILY HISTORY  Family History   Problem Relation Name Age of Onset    Atrial fibrillation Mother Medina Logan     Hypertension Mother Medina Logan     Kidney disease Mother Medina Logan     Arthritis Father Cruz A Desmond     Atrial fibrillation Father Cruz A Desmond     Cancer Father Cruz A Desmond     Heart disease Father Cruz A Desmond     Hernia Father Cruz A Desmond     Hypertension Father Cruz A Desmond     Hypertension Maternal Grandmother Taina Heinollum     Stroke Maternal Grandmother Taina Hanum     Heart disease Paternal Grandfather Giovanni Logan     Hypertension Paternal Grandfather Giovanni Logan     Breast cancer Paternal Grandmother Essie Desmond     Cancer Paternal Grandmother Essie Logan     Hypertension Paternal Grandmother Essie Logan     Atrial fibrillation Brother Barry Logan     Hypertension Brother Barry oLgan     Hypertension Sister Ana María Denney      Father - COPD/asthma  No family history of cancer.  No family history of autoimmune disorders.    REVIEW OF SYSTEMS     REVIEW OF SYSTEMS  Review of Systems    Constitutional: No fever, no chills, no night sweats.    Eyes: No double vision, no floaters, no dry eyes.   ENT: See HPI.   Neck: No neck stiffness.  Cardiovascular: No sharp chest pain, no heart racing, no leg swelling.  Respiratory: as noted in HPI.   Gastrointestinal: No nausea, no vomiting, no diarrhea.   Musculoskeletal: No joint pain, no back pain.   Integumentary: No rashes or sores.  Neurological: No dizziness, no headaches. Sleeping well.  Psychiatric: No mood changes.   Endocrine: No hot flashes, no cold intolerance, weight is  stable.  Hematologic: No easy bruising or bleeding.    PHYSICAL EXAM     VITAL SIGNS:   Vitals:    04/08/25 1028   BP: 162/77   Pulse: 76   Temp: 36.2 °C (97.1 °F)   SpO2: 98%          CURRENT WEIGHT: Body mass index is 17.52 kg/m².    PREVIOUS WEIGHTS:  Wt Readings from Last 3 Encounters:   03/17/25 (!) 41.7 kg (92 lb)   03/10/25 (!) 41.7 kg (92 lb)   03/05/25 (!) 41.3 kg (91 lb)       Physical Exam    Constitutional: General appearance: Alert and oriented.  No acute distress. Well developed, well nourished.  Head and face: Symmetric  ENT: external inspection of ear and nose normal. No intranasal polyps. No oropharyngeal exudates.    Oropharynx: normal   Neck: supple, no lymphadenopathy  Pulmonary: Chest is normal. No increased work of breathing or signs of respiratory distress. Clear to auscultation bilaterally - no crackles, wheezing, or rhonchi.   Cardiovascular: Heart rate and rhythm normal. Normal S1, S2 - no murmurs, gallops, or pericardial rub.   Abdomen: Soft, non tender, +BS  Extremities: No edema. No clubbing or cyanosis of the fingernails.    Neurologic: Moves all four extremities   MSK: Normal movements of extremities. Gait normal   Psychiatric: Intact judgement and insight.    RESULTS/DATA     Pulmonary Function Test Results     None on record     Chest Radiograph     XR chest 2 views 03/17/2025    Narrative  Interpreted By:  Cristina Rose,  STUDY:  XR CHEST 2 VIEWS 3/17/2025 4:41 pm    INDICATION:  Signs/Symptoms:diffuse wheezing w/ rales    COMPARISON:  03/09/2024    ACCESSION NUMBER(S):  [id number]    ORDERING CLINICIAN:  MERNA SOLIZ    TECHNIQUE:  PA and lateral views of the chest were acquired.    FINDINGS:  The heart and mediastinum are normal in appearance. The lungs are  clear with no pleural abnormality identified.    There is a slight lumbar levoscoliosis.    Impression  No acute cardiopulmonary disease.    Signed by: Cristina Rose 3/18/2025 6:27 PM  Dictation workstation:    [id number]      Chest CT Scan     CT lung screening low dose 04/10/2024    Narrative  Interpreted By:  David Gipson,  STUDY:  CT LUNG SCREENING LOW DOSE;  4/10/2024 2:22 pm    INDICATION:  Signs/Symptoms:See Associated Diagnosis.    COMPARISON:  None.    ACCESSION NUMBER(S):  [id number]    ORDERING CLINICIAN:  MERNA SOLIZ    TECHNIQUE:  Helical data acquisition of the chest was obtained without IV  contrast material.  Images were reformatted in axial, coronal, and  sagittal planes.    FINDINGS:  LUNGS AND AIRWAYS:  The trachea and central airways are patent. No endobronchial lesion  is seen.    There is mild bilateral upper lung predominant centrilobular and  paraseptal emphysema.Upper lung micronodularity.There is no focal  consolidation, pleural effusion, or pneumothorax.    3 mm right upper lobe nodule, image 61/289.  3 mm right upper lobe nodule, image 108/289.  3 mm right lower lobe nodule, image 137/289.  Multiple additional 2-3 mm nodules are annotated on PACs.    MEDIASTINUM AND RA, LOWER NECK AND AXILLA:  The visualized thyroid gland is within normal limits.  No evidence of thoracic lymphadenopathy by CT criteria.  Esophagus appears within normal limits as seen.    HEART AND VESSELS:  The thoracic aorta normal in course and caliber.There is mild  scattered calcified atherosclerosis present. Main pulmonary artery  and its branches are normal in caliber. No coronary artery  calcifications are seen. Please note, the study is not optimized for  evaluation of coronary arteries. The cardiac chambers are not  enlarged. There is no pericardial effusion seen.    UPPER ABDOMEN:  The visualized subdiaphragmatic structures demonstrate no remarkable  findings.        CHEST WALL AND OSSEOUS STRUCTURES:  Chest wall is within normal limits.  No acute osseous pathology.There are no suspicious osseous lesions.    Subacute right lower rib fracture deformities.    Impression  1.  Few small bilateral  "noncalcified pulmonary nodules measuring up  to 3 mm, as described above. Continued screening with low-dose  noncontrast chest CT in 12 months (from current date) is recommended.  2. Mild upper lung predominant emphysema. Upper lung micronodularity,  likely due to smoking-related respiratory bronchiolitis.  3. Right lower rib subacute fractures. Correlate with history of  trauma      LUNG RADS CATEGORY:  Lung Rad: Lung-RADS 2 (Benign Appearance or Indolent Behavior)    Recommendation: Continue annual screening with Low Dose Chest CT in  12 months, recommended as per American College of Radiology  Guidelines Lung-RADS Version 2022.      MACRO:  None    Signed by: David Glass 4/10/2024 6:51 PM  Dictation workstation:   [id number]      Echocardiogram     No results found for this or any previous visit from the past 365 days.       Labwork     Lab Results   Component Value Date    WBC 10.2 03/17/2025    HGB 12.5 03/17/2025    HCT 36.1 03/17/2025    MCV 97.3 03/17/2025     03/17/2025      Lab Results   Component Value Date    GLUCOSE 96 11/29/2024    CALCIUM 9.5 11/29/2024     11/29/2024    K 4.5 11/29/2024    CO2 25 11/29/2024     11/29/2024    BUN 18 11/29/2024    CREATININE 0.97 11/29/2024      Lab Results   Component Value Date    ALT 21 08/28/2024    AST 33 08/28/2024    ALKPHOS 90 08/28/2024    BILITOT 0.7 08/28/2024        Protime   Date/Time Value Ref Range Status   03/09/2024 05:37 PM 10.3 9.8 - 12.8 seconds Final     INR   Date/Time Value Ref Range Status   03/09/2024 05:37 PM 0.9 0.9 - 1.1 Final       No results found for: \"ICIGE\", \"IGE\", \"ICA04\", \"ASPFU\", \"IGG\", \"IGA\", \"IGM\"    Peripheral Eosinophil Count/Percentage:   ABSOLUTE EOSINOPHILS (cells/uL)   Date Value   03/17/2025 153     EOSINOPHILS (%)   Date Value   03/17/2025 1.5       ASSESSMENT/PLAN   Ms. Joy is a 69 y.o. female, with a history of rheumatoid arthritis, seasonal allergies and syncope, who is a  former smoker " (~51 pack years), who presents to a [facility] for an initial evaluation for cough.     Problem List and Orders  Problem List Items Addressed This Visit       Centrilobular emphysema (Multi)     Other Visit Diagnoses       Shortness of breath        Persistent cough for 3 weeks or longer                Assessment and Plan / Recommendations:  Problem List Items Addressed This Visit    None     Persistent Cough/Former Smoker  - Will get PFTs  - continue Trelegy 100mcg- 1 puff once a day - rinse mouth after each use  - continue albuterol HFA or albuterol nebulizers every 4-6 hours as needed for shortness of breath      Follow up in 1 months (after PFTs) or sooner if needed.    If you have any questions please call the office [phone]    Thank you for visiting the Pulmonary clinic today!   Alexandra Peters CNP  [phone]

## 2025-04-08 ENCOUNTER — APPOINTMENT (OUTPATIENT)
Facility: CLINIC | Age: 70
End: 2025-04-08
Payer: MEDICARE

## 2025-04-08 VITALS
TEMPERATURE: 97.1 F | SYSTOLIC BLOOD PRESSURE: 162 MMHG | WEIGHT: 95.8 LBS | OXYGEN SATURATION: 98 % | HEIGHT: 62 IN | BODY MASS INDEX: 17.63 KG/M2 | DIASTOLIC BLOOD PRESSURE: 77 MMHG | HEART RATE: 76 BPM

## 2025-04-08 DIAGNOSIS — J43.2 CENTRILOBULAR EMPHYSEMA (MULTI): ICD-10-CM

## 2025-04-08 DIAGNOSIS — R05.3 CHRONIC COUGH: Primary | ICD-10-CM

## 2025-04-08 DIAGNOSIS — R06.02 SHORTNESS OF BREATH: ICD-10-CM

## 2025-04-08 DIAGNOSIS — R05.3 PERSISTENT COUGH FOR 3 WEEKS OR LONGER: ICD-10-CM

## 2025-04-08 DIAGNOSIS — Z87.891 FORMER SMOKER: ICD-10-CM

## 2025-04-08 PROCEDURE — 1123F ACP DISCUSS/DSCN MKR DOCD: CPT

## 2025-04-08 PROCEDURE — 3008F BODY MASS INDEX DOCD: CPT

## 2025-04-08 PROCEDURE — 3077F SYST BP >= 140 MM HG: CPT

## 2025-04-08 PROCEDURE — 3078F DIAST BP <80 MM HG: CPT

## 2025-04-08 PROCEDURE — 1126F AMNT PAIN NOTED NONE PRSNT: CPT

## 2025-04-08 PROCEDURE — 99204 OFFICE O/P NEW MOD 45 MIN: CPT

## 2025-04-08 PROCEDURE — 1157F ADVNC CARE PLAN IN RCRD: CPT

## 2025-04-08 PROCEDURE — 1159F MED LIST DOCD IN RCRD: CPT

## 2025-04-08 RX ORDER — ALBUTEROL SULFATE 0.83 MG/ML
3 SOLUTION RESPIRATORY (INHALATION) ONCE
OUTPATIENT
Start: 2025-04-08 | End: 2025-04-08

## 2025-04-08 RX ORDER — ALBUTEROL SULFATE 90 UG/1
1 INHALANT RESPIRATORY (INHALATION) ONCE
OUTPATIENT
Start: 2025-04-08

## 2025-04-08 ASSESSMENT — ASTHMA QUESTIONNAIRES
QUESTION_3 LAST FOUR WEEKS HOW OFTEN DID YOUR ASTHMA SYMPTOMS (WHEEZING, COUGHING, SHORTNESS OF BREATH, CHEST TIGHTNESS OR PAIN) WAKE YOU UP AT NIGHT OR EARLIER THAN USUAL IN THE MORNING: NOT AT ALL
ACT_TOTALSCORE: 18
QUESTION_5 LAST FOUR WEEKS HOW WOULD YOU RATE YOUR ASTHMA CONTROL: SOMEWHAT CONTROLLED
QUESTION_1 LAST FOUR WEEKS HOW MUCH OF THE TIME DID YOUR ASTHMA KEEP YOU FROM GETTING AS MUCH DONE AT WORK, SCHOOL OR AT HOME: A LITTLE OF THE TIME
QUESTION_4 LAST FOUR WEEKS HOW OFTEN HAVE YOU USED YOUR RESCUE INHALER OR NEBULIZER MEDICATION (SUCH AS ALBUTEROL): ONCE A WEEK OR LESS
QUESTION_2 LAST FOUR WEEKS HOW OFTEN HAVE YOU HAD SHORTNESS OF BREATH: ONCE A DAY

## 2025-04-08 ASSESSMENT — PATIENT HEALTH QUESTIONNAIRE - PHQ9
SUM OF ALL RESPONSES TO PHQ9 QUESTIONS 1 AND 2: 0
2. FEELING DOWN, DEPRESSED OR HOPELESS: NOT AT ALL
1. LITTLE INTEREST OR PLEASURE IN DOING THINGS: NOT AT ALL

## 2025-04-08 ASSESSMENT — PAIN SCALES - GENERAL: PAINLEVEL_OUTOF10: 0-NO PAIN

## 2025-04-08 NOTE — PROGRESS NOTES
Tobacco Treatment Counseling Follow Up Visit    Name: Sang Joy            MRN: [id number]          YOB: 1955           Age: 69 y.o.                  Today's Date: 4/4/2025  Primary Care Physician: AHMET Prabhakar  Referring Physician: No ref. provider found  Program Location: [place] [address] [place]       Virtual or Telephone Consent    An interactive audio and video telecommunication system which permits real time communications between the patient (at the originating site) and provider (at the distant site) was utilized to provide this telehealth service.   Verbal consent was requested and obtained from Sang Joy on this date, 04/04/2025 for a telehealth visit and the patient's location was confirmed at the time of the visit.    Start time: 10:30 AM   End time: 10:36 AM    Sang Joy presents for follow up session for Tobacco Treatment Counseling. UPDATES: Patient states that she smoked her last cigarette on Tuesday evening 4/1/25. She has since been smoke-free and is overall doing well. She does report cravings but withdrawals are minimal. Patient has been using the nicotine patch since quitting as well and is so far doing well with this.   Will continue to follow closely for now for relapse prevention support. Follow up scheduled 4/10/25.      Diane Mendes RN

## 2025-04-10 ENCOUNTER — TELEMEDICINE CLINICAL SUPPORT (OUTPATIENT)
Dept: CARDIAC REHAB | Facility: CLINIC | Age: 70
End: 2025-04-10
Payer: MEDICARE

## 2025-04-10 DIAGNOSIS — F17.210 CIGARETTE SMOKER: ICD-10-CM

## 2025-04-10 PROCEDURE — 99406 BEHAV CHNG SMOKING 3-10 MIN: CPT | Mod: 95 | Performed by: INTERNAL MEDICINE

## 2025-04-10 NOTE — PROGRESS NOTES
"Tobacco Treatment Counseling Follow Up Visit    Name: Sang Joy            MRN: [id number]          YOB: 1955           Age: 69 y.o.                  Today's Date: 4/10/2025  Primary Care Physician: AHMET Prabhakar  Referring Physician: No ref. provider found  Program Location: [place] [address] [place]       Virtual or Telephone Consent    An interactive audio and video telecommunication system which permits real time communications between the patient (at the originating site) and provider (at the distant site) was utilized to provide this telehealth service.   Verbal consent was requested and obtained from Sang Joy on this date, 04/10/25 for a telehealth visit and the patient's location was confirmed at the time of the visit.    Start time: 10:31 AM   End time: 10:45 AM    Sang Joy presents for follow up session for Tobacco Treatment Counseling. UPDATES: Patient states that she is doing well with cessation efforts and remains smoke-free at this time. She has reached 8-day PPA. She reports having a couple of \"edgy days\" due to trying to organize her medical appointments, but is overall feeling good physically and mentally and is working through cravings comfortably. Continues with 14 mg nicotine patch. Does not feel as though she needs any short-acting NRT at this time but will continue to monitor.  This week has been a little chaotic for her. They are preparing to open their camper on 4/25. She is very much looking forward to this. She also has a few upcoming medical appointments.  Will continue to follow closely for relapse prevention support. Follow up one week on 4/17/25.          Diane Mendes RN  "

## 2025-04-11 ENCOUNTER — HOSPITAL ENCOUNTER (OUTPATIENT)
Dept: RADIOLOGY | Facility: CLINIC | Age: 70
Discharge: HOME | End: 2025-04-11
Payer: MEDICARE

## 2025-04-11 ENCOUNTER — APPOINTMENT (OUTPATIENT)
Dept: RADIOLOGY | Facility: CLINIC | Age: 70
End: 2025-04-11
Payer: MEDICARE

## 2025-04-11 DIAGNOSIS — Z12.31 ENCOUNTER FOR SCREENING MAMMOGRAM FOR BREAST CANCER: ICD-10-CM

## 2025-04-11 PROCEDURE — 77067 SCR MAMMO BI INCL CAD: CPT

## 2025-04-12 LAB — MAGNESIUM SERPL-MCNC: 1.8 MG/DL (ref 1.5–2.5)

## 2025-04-14 ENCOUNTER — HOSPITAL ENCOUNTER (OUTPATIENT)
Dept: RESPIRATORY THERAPY | Facility: HOSPITAL | Age: 70
Discharge: HOME | End: 2025-04-14
Payer: MEDICARE

## 2025-04-14 DIAGNOSIS — R05.3 CHRONIC COUGH: ICD-10-CM

## 2025-04-14 DIAGNOSIS — Z87.891 FORMER SMOKER: ICD-10-CM

## 2025-04-14 LAB
MGC ASCENT PFT - FEV1 - POST: 1.39
MGC ASCENT PFT - FEV1 - PRE: 1.24
MGC ASCENT PFT - FEV1 - PREDICTED: 2.04
MGC ASCENT PFT - FVC - POST: 1.89
MGC ASCENT PFT - FVC - PRE: 1.75
MGC ASCENT PFT - FVC - PREDICTED: 2.58

## 2025-04-14 PROCEDURE — 94726 PLETHYSMOGRAPHY LUNG VOLUMES: CPT

## 2025-04-14 PROCEDURE — 94640 AIRWAY INHALATION TREATMENT: CPT

## 2025-04-14 PROCEDURE — 2500000002 HC RX 250 W HCPCS SELF ADMINISTERED DRUGS (ALT 637 FOR MEDICARE OP, ALT 636 FOR OP/ED)

## 2025-04-14 RX ORDER — ALBUTEROL SULFATE 0.83 MG/ML
3 SOLUTION RESPIRATORY (INHALATION) ONCE
Status: COMPLETED | OUTPATIENT
Start: 2025-04-14 | End: 2025-04-14

## 2025-04-14 RX ORDER — ALBUTEROL SULFATE 90 UG/1
1 INHALANT RESPIRATORY (INHALATION) ONCE
Status: COMPLETED | OUTPATIENT
Start: 2025-04-14 | End: 2025-04-14

## 2025-04-14 RX ADMIN — ALBUTEROL SULFATE 3 ML: 2.5 SOLUTION RESPIRATORY (INHALATION) at 10:06

## 2025-04-17 ENCOUNTER — APPOINTMENT (OUTPATIENT)
Dept: CARDIAC REHAB | Facility: CLINIC | Age: 70
End: 2025-04-17
Payer: MEDICARE

## 2025-04-17 DIAGNOSIS — F17.210 CIGARETTE SMOKER: ICD-10-CM

## 2025-04-17 PROCEDURE — 99406 BEHAV CHNG SMOKING 3-10 MIN: CPT | Mod: 95 | Performed by: INTERNAL MEDICINE

## 2025-04-17 NOTE — PROGRESS NOTES
Tobacco Treatment Counseling Follow Up Visit    Name: Sang Joy            MRN: [id number]          YOB: 1955           Age: 69 y.o.                  Today's Date: 4/17/2025  Primary Care Physician: AHMET Prabhakar  Referring Physician: No ref. provider found  Program Location: [place] [address] [place]       Virtual or Telephone Consent    An interactive audio and video telecommunication system which permits real time communications between the patient (at the originating site) and provider (at the distant site) was utilized to provide this telehealth service.   Verbal consent was requested and obtained from Sang Joy on this date, 04/17/25 for a telehealth visit and the patient's location was confirmed at the time of the visit.    Start time: 10:31 AM    End time: 10:41 AM     Sang Joy presents for follow up session for Tobacco Treatment Counseling. UPDATES: Patient remains smoke-free at this time. She has reached 15-day PPA.  She has noticed that she has gained a few pounds already, has increased her snacking... discussed healthier snacks and using non-calorie substitutes such as gum and mints.   Continues with the 7 mg nicotine patch. Does not feel as though she needs to add in any short acting NRT at this time.  Will continue to follow weekly for relapse prevention support. Follow up 4/24/25.       Diane Mendes RN

## 2025-04-24 ENCOUNTER — TELEMEDICINE CLINICAL SUPPORT (OUTPATIENT)
Dept: CARDIAC REHAB | Facility: CLINIC | Age: 70
End: 2025-04-24
Payer: MEDICARE

## 2025-04-24 DIAGNOSIS — F17.210 CIGARETTE SMOKER: ICD-10-CM

## 2025-04-24 PROCEDURE — 99406 BEHAV CHNG SMOKING 3-10 MIN: CPT | Performed by: INTERNAL MEDICINE

## 2025-04-24 NOTE — PROGRESS NOTES
Tobacco Treatment Counseling Follow Up Visit    Name: Sang Joy            MRN: [id number]          YOB: 1955           Age: 69 y.o.                  Today's Date: 4/24/2025  Primary Care Physician: @PCP@  Referring Physician: No ref. provider found  Program Location: [facility] [id number] [place]       Virtual or Telephone Consent    An interactive audio and video telecommunication system which permits real time communications between the patient (at the originating site) and provider (at the distant site) was utilized to provide this telehealth service.   Verbal consent was requested and obtained from Sang Joy on this date, 04/24/25 for a telehealth visit and the patient's location was confirmed at the time of the visit.    Start time: 10:00 AM   End time: 10:10 AM    Sang Joy presents for follow up session for Tobacco Treatment Counseling. UPDATES: Patient remains smoke-free at this time.  Patient has had quite a stressful week. She has an infection in a tooth, going to see dentist today. She has gotten started on antibiotics. They are also going to be opening their camper tomorrow.   The stress has been triggering her to want to smoke, but she has been successfully working through these cravings by keeping busy and distracted.   Will continue to follow for relapse prevention support.      Diane Mendes RN

## 2025-05-08 ENCOUNTER — APPOINTMENT (OUTPATIENT)
Facility: CLINIC | Age: 70
End: 2025-05-08
Payer: MEDICARE

## 2025-05-08 ENCOUNTER — TELEMEDICINE CLINICAL SUPPORT (OUTPATIENT)
Dept: CARDIAC REHAB | Facility: CLINIC | Age: 70
End: 2025-05-08
Payer: MEDICARE

## 2025-05-08 DIAGNOSIS — F17.210 CIGARETTE SMOKER: ICD-10-CM

## 2025-05-08 PROCEDURE — 99406 BEHAV CHNG SMOKING 3-10 MIN: CPT | Mod: 95 | Performed by: INTERNAL MEDICINE

## 2025-05-08 NOTE — PROGRESS NOTES
Tobacco Treatment Counseling Follow Up Visit    Name: Sang Joy            MRN: [id number]          YOB: 1955           Age: 69 y.o.                  Today's Date: 5/8/2025  Primary Care Physician: AHMET Prabhakar  Referring Physician: No ref. provider found  Program Location: [place] [address] [place]       Virtual or Telephone Consent    An interactive audio and video telecommunication system which permits real time communications between the patient (at the originating site) and provider (at the distant site) was utilized to provide this telehealth service.   Verbal consent was requested and obtained from Sang Joy on this date, 05/08/25 for a telehealth visit and the patient's location was confirmed at the time of the visit.    Start time: 10:00 AM   End time: 10:10 AM    Sang Joy presents for follow up session for Tobacco Treatment Counseling. UPDATES: Patient remains smoke-free at this time. Continuing to use the nicotine patches, about to step down to 7 mg. Reassured her that she can go back to 14 mg if she needs to.   She went to a friends house yesterday who smokes, and the house smelled like smoke but this was not triggering.   She is overall doing very well with cessation efforts. Will continue to follow for relapse prevention support.   Follow-up on 6/4/25.       Diane Mendes RN

## 2025-05-18 NOTE — PROGRESS NOTES
" Patient: Sang Joy    [id number]  : 1955 -- AGE 69 y.o.    Provider: Alexandra PAYNE- CNP     [facility] [place] [facility]   Service Date: 25           [facility]  Follow Up Visit Note      HISTORY OF PRESENT ILLNESS     The patient's referring provider is: No ref. provider found    HISTORY OF PRESENT ILLNESS   Sang Joy \"Saul" is a 69 y.o. female with a history of rheumatoid arthritis, seasonal allergies and syncope, who is a former smoker (~51 pack years), who presents to a [facility] for a follow up evaluation for cough.     I have independently interviewed and examined the patient in the office and reviewed available records.    Current History    Since last visit she quit smoking 25. Reports her cough has near resolved. Has seasonal allergies, when around pollen she is triggered that causes with cough, sneeze and wheezing, she is outdoors a lot, camps from spring to fall. She takes Claritin and flonase. Has not used Trelegy d/t cough resolving.  CT Chest showing tiny 4mm nodules and mucus plugging. No emphysema. PFTs show no obstruction, reduced DLCO.     25: On today's visit, the patient reports having COVID in October, sinus and respiratory infection in January and pneumonia in March. Has had a persistent cough since covid last October.  She recently quit smoking 25, smoked since she was 17 y/o.  Reports her cough and wheezing has improved with Trelegy inhaler she started a couple weeks ago. She has not needed to use her albuterol inhaler since starting Trelegy daily.  She started Guaifenesin 200 mg tablet 2-6 times a day ordered by her PCP, has been coughing up green to brownish green mucus since.  She completed azithromycin and prednisone burst in mid March ordered by PCP for sinus infection and cough.  Has dyspnea with exertion, not with normal pace of walking.   Takes prilosec daily with " good control on most days.  Takes claritin and flonase daily for seasonal allergies.  Denies chest tightness and and ER visits for breathing issues.    Previous pulmonary history: COPD, states she has had asthma since age of 18 (started smoking at this age)    Inhalers/nebulized medications: Trelegy and albuterol    Hospitalization History: She has not been hospitalized over the last year for breathing related problem.    Sleep history:  Denies snoring, apnea, feeling tired during the day or taking naps during the day.     ALLERGIES AND MEDICATIONS     ALLERGIES  Allergies   Allergen Reactions    Clarithromycin Shortness of breath    Garlic Diarrhea    Influenza Virus Vaccines Anaphylaxis    Iodine Hives    Penicillins Anaphylaxis    Acetaminophen Other     Cannot take while on Plaquenil    Codeine Nausea/vomiting    Doxycycline Syncope     Near syncope. Occurred 3.5 days into a 7 day course of doxy. She had N/V/D once she started the doxy, then had the near-syncopal. Became lightheaded, hot, then started to go down. States her  caught her.     Tetanus Toxoid, Adsorbed Other    Amoxicillin Rash       MEDICATIONS  Current Outpatient Medications   Medication Sig Dispense Refill    albuterol (ProAir HFA) 90 mcg/actuation inhaler Inhale 2 puffs every 6 hours if needed for wheezing. 18 g 2    calcium-vitamin D3-vitamin K (Viactiv) 650 mg-12.5 mcg-40 mcg chewable tablet Chew 2 tablets once daily.      cyanocobalamin (Vitamin B-12) 1,000 mcg tablet Take 1 tablet (1,000 mcg) by mouth once daily.      fluticasone (Flonase Allergy Relief) 50 mcg/actuation nasal spray 1 sprays, Nasal, DAILY, in each nostril, Refill(s) 0      fluticasone-umeclidin-vilanter (Trelegy Ellipta) 100-62.5-25 mcg blister with device Inhale 1 puff once daily. 90 each 3    furosemide (Lasix) 40 mg tablet Take 1 tablet (40 mg) by mouth once daily as needed.      hydroxychloroquine (Plaquenil) 200 mg tablet Take 1 tablet Monday Wednesday and Friday  with food 36 tablet 3    krill oil 500 mg capsule Take by mouth once daily.      loratadine (Claritin) 10 mg tablet Take 1 tablet (10 mg) by mouth once daily.      losartan (Cozaar) 25 mg tablet Take 1 tablet (25 mg) by mouth 2 times a day. 180 tablet 3    magnesium oxide (Mag-Ox) 400 mg tablet Take 1 tablet (400 mg) by mouth once daily. 90 tablet 3    meclizine (Antivert) 12.5 mg tablet Take 1 tablet (12.5 mg) by mouth 3 times a day as needed for dizziness. 30 tablet 1    metoprolol succinate XL (Toprol-XL) 50 mg 24 hr tablet Take 1 tablet (50 mg) by mouth 2 times a day. Do not crush or chew. 180 tablet 3    multivitamin capsule 1 capsule once daily.      nicotine (Nicoderm CQ) 14 mg/24 hr patch Place 1 patch over 24 hours on the skin once every 24 hours. 42 patch 0    nicotine (Nicoderm CQ) 7 mg/24 hr patch Place 1 patch over 24 hours on the skin once every 24 hours for 14 days. Remove old patch before applying a new one. Do not fill before May 5, 2025. 14 patch 0    omeprazole OTC (PriLOSEC OTC) 20 mg EC tablet 1 tablet (20 mg) once daily in the morning. Take before meals.      POTASSIUM GLUCONATE ORAL Take 90 mg by mouth once daily.       No current facility-administered medications for this visit.         PAST HISTORY     PAST MEDICAL HISTORY  RA - plaquenil  Syncope  Seasonal allergies    PAST SURGICAL HISTORY  Past Surgical History:   Procedure Laterality Date    APPENDECTOMY      DENTAL SURGERY      DILATION AND CURETTAGE OF UTERUS      WISDOM TOOTH EXTRACTION         IMMUNIZATION HISTORY  Immunization History   Administered Date(s) Administered    COVID-19, mRNA, LNP-S, PF, 30 mcg/0.3 mL dose 03/11/2021, 04/01/2021, 10/03/2021    Influenza, seasonal, injectable 09/09/2015    Novel influenza-H1N1-09, preservative-free 02/15/2010    Pfizer COVID-19 vaccine, 12 years and older, (30mcg/0.3mL) (Comirnaty) 10/06/2023    Pfizer COVID-19 vaccine, bivalent, age 12 years and older (30 mcg/0.3 mL) 01/17/2023    Pfizer  Jerome Cap SARS-CoV-2 2022    Pneumococcal conjugate vaccine, 20-valent (PREVNAR 20) 10/12/2023    RESPIRATORY SYNCYTIAL VIRUS (RSV), ELIGIBLE PREGNANT PTS, 0.5 ML (ABRYSVO) 10/12/2023       SOCIAL HISTORY  Tobacco Smokin - 68 y/o- 1/2 ppd more recently was smoking 1-2 ppd - quit / ~51 pack years   Smokeless Tobacco/Vaping: None  Illicit drugs: none  Alcohol consumption: 2 glasses of wine daily  Pets: none  Living situation: live at your     OCCUPATIONAL/ENVIRONMENTAL HISTORY  Occupation: [employer] and [employer]. 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  No known exposure to asbestos, silica, beryllium or inhaled metals.  Had a Parakeet for 3 years.    FAMILY HISTORY  Family History   Problem Relation Name Age of Onset    Atrial fibrillation Mother Medina Logan     Hypertension Mother Medina Logan     Kidney disease Mother Medina Logan     Arthritis Father Cruz A Desmond     Atrial fibrillation Father Cruz A Desmond     Cancer Father Cruz A Desmond     Heart disease Father Cruz A Desmond     Hernia Father Cruz A Desmond     Hypertension Father Cruz A Desmond     Hypertension Maternal Grandmother Taina Randall     Stroke Maternal Grandmother Taina Randall     Heart disease Paternal Grandfather Giovanni Logan     Hypertension Paternal Grandfather Giovanni Logan     Breast cancer Paternal Grandmother Essie Desmond     Cancer Paternal Grandmother Essie Desmond     Hypertension Paternal Grandmother Essie Desmond     Atrial fibrillation Brother Barry Logan     Hypertension Brother Barry Logan     Hypertension Sister Ana María Denney      Father - COPD/asthma  No family history of cancer.  No family history of autoimmune disorders.    REVIEW OF SYSTEMS     REVIEW OF SYSTEMS  Review of Systems    Constitutional: No fever, no chills, no night sweats.    Eyes: No double vision, no floaters, no dry eyes.   ENT: See HPI.   Neck: No  neck stiffness.  Cardiovascular: No sharp chest pain, no heart racing, no leg swelling.  Respiratory: as noted in HPI.   Gastrointestinal: No nausea, no vomiting, no diarrhea.   Musculoskeletal: No joint pain, no back pain.   Integumentary: No rashes or sores.  Neurological: No dizziness, no headaches. Sleeping well.  Psychiatric: No mood changes.   Endocrine: No hot flashes, no cold intolerance, weight is stable.  Hematologic: No easy bruising or bleeding.    PHYSICAL EXAM     VITAL SIGNS:   There were no vitals filed for this visit.         CURRENT WEIGHT: There is no height or weight on file to calculate BMI.    PREVIOUS WEIGHTS:  Wt Readings from Last 3 Encounters:   04/08/25 (!) 43.5 kg (95 lb 12.8 oz)   03/17/25 (!) 41.7 kg (92 lb)   03/10/25 (!) 41.7 kg (92 lb)       Physical Exam    Constitutional: General appearance: Alert and oriented.  No acute distress. Well developed, well nourished.  Head and face: Symmetric  ENT: external inspection of ear and nose normal. No intranasal polyps. No oropharyngeal exudates.    Oropharynx: normal   Neck: supple, no lymphadenopathy  Pulmonary: Chest is normal. No increased work of breathing or signs of respiratory distress. Clear to auscultation bilaterally - no crackles, wheezing, or rhonchi.   Cardiovascular: Heart rate and rhythm normal. Normal S1, S2 - no murmurs, gallops, or pericardial rub.   Abdomen: Soft, non tender, +BS  Extremities: No edema. No clubbing or cyanosis of the fingernails.    Neurologic: Moves all four extremities   MSK: Normal movements of extremities. Gait normal   Psychiatric: Intact judgement and insight.    RESULTS/DATA     Pulmonary Function Test Results     4/14/25:      Chest Radiograph     XR chest 2 views 03/17/2025    Narrative  Interpreted By:  Cristina Rose,  STUDY:  XR CHEST 2 VIEWS 3/17/2025 4:41 pm    INDICATION:  Signs/Symptoms:diffuse wheezing w/ rales    COMPARISON:  03/09/2024    ACCESSION NUMBER(S):  [id number]    ORDERING  CLINICIAN:  MERNA SOLIZ    TECHNIQUE:  PA and lateral views of the chest were acquired.    FINDINGS:  The heart and mediastinum are normal in appearance. The lungs are  clear with no pleural abnormality identified.    There is a slight lumbar levoscoliosis.    Impression  No acute cardiopulmonary disease.    Signed by: Cristina Rose 3/18/2025 6:27 PM  Dictation workstation:   [id number]      Chest CT Scan     CT lung screening low dose 04/10/2024    Narrative  Interpreted By:  David Gipson,  STUDY:  CT LUNG SCREENING LOW DOSE;  4/10/2024 2:22 pm    INDICATION:  Signs/Symptoms:See Associated Diagnosis.    COMPARISON:  None.    ACCESSION NUMBER(S):  [id number]    ORDERING CLINICIAN:  MERNA SOLIZ    TECHNIQUE:  Helical data acquisition of the chest was obtained without IV  contrast material.  Images were reformatted in axial, coronal, and  sagittal planes.    FINDINGS:  LUNGS AND AIRWAYS:  The trachea and central airways are patent. No endobronchial lesion  is seen.    There is mild bilateral upper lung predominant centrilobular and  paraseptal emphysema.Upper lung micronodularity.There is no focal  consolidation, pleural effusion, or pneumothorax.    3 mm right upper lobe nodule, image 61/289.  3 mm right upper lobe nodule, image 108/289.  3 mm right lower lobe nodule, image 137/289.  Multiple additional 2-3 mm nodules are annotated on PACs.    MEDIASTINUM AND RA, LOWER NECK AND AXILLA:  The visualized thyroid gland is within normal limits.  No evidence of thoracic lymphadenopathy by CT criteria.  Esophagus appears within normal limits as seen.    HEART AND VESSELS:  The thoracic aorta normal in course and caliber.There is mild  scattered calcified atherosclerosis present. Main pulmonary artery  and its branches are normal in caliber. No coronary artery  calcifications are seen. Please note, the study is not optimized for  evaluation of coronary arteries. The cardiac chambers are not  enlarged. There  "is no pericardial effusion seen.    UPPER ABDOMEN:  The visualized subdiaphragmatic structures demonstrate no remarkable  findings.        CHEST WALL AND OSSEOUS STRUCTURES:  Chest wall is within normal limits.  No acute osseous pathology.There are no suspicious osseous lesions.    Subacute right lower rib fracture deformities.    Impression  1.  Few small bilateral noncalcified pulmonary nodules measuring up  to 3 mm, as described above. Continued screening with low-dose  noncontrast chest CT in 12 months (from current date) is recommended.  2. Mild upper lung predominant emphysema. Upper lung micronodularity,  likely due to smoking-related respiratory bronchiolitis.  3. Right lower rib subacute fractures. Correlate with history of  trauma      LUNG RADS CATEGORY:  Lung Rad: Lung-RADS 2 (Benign Appearance or Indolent Behavior)    Recommendation: Continue annual screening with Low Dose Chest CT in  12 months, recommended as per American College of Radiology  Guidelines Lung-RADS Version 2022.      MACRO:  None    Signed by: David Glass 4/10/2024 6:51 PM  Dictation workstation:   [id number]      Echocardiogram     No testing done       Labwork     Lab Results   Component Value Date    WBC 10.2 03/17/2025    HGB 12.5 03/17/2025    HCT 36.1 03/17/2025    MCV 97.3 03/17/2025     03/17/2025      Lab Results   Component Value Date    GLUCOSE 96 11/29/2024    CALCIUM 9.5 11/29/2024     11/29/2024    K 4.5 11/29/2024    CO2 25 11/29/2024     11/29/2024    BUN 18 11/29/2024    CREATININE 0.97 11/29/2024      Lab Results   Component Value Date    ALT 21 08/28/2024    AST 33 08/28/2024    ALKPHOS 90 08/28/2024    BILITOT 0.7 08/28/2024        Protime   Date/Time Value Ref Range Status   03/09/2024 05:37 PM 10.3 9.8 - 12.8 seconds Final     INR   Date/Time Value Ref Range Status   03/09/2024 05:37 PM 0.9 0.9 - 1.1 Final       No results found for: \"ICIGE\", \"IGE\", \"ICA04\", \"ASPFU\", \"IGG\", \"IGA\", " "\"IGM\"    Peripheral Eosinophil Count/Percentage:   ABSOLUTE EOSINOPHILS (cells/uL)   Date Value   03/17/2025 153     EOSINOPHILS (%)   Date Value   03/17/2025 1.5       ASSESSMENT/PLAN   Ms. Joy is a 69 y.o. female, with a history of rheumatoid arthritis, seasonal allergies and syncope, who is a  former smoker (~51 pack years), who presents to a [facility] for an initial evaluation for cough.     Problem List and Orders  Problem List Items Addressed This Visit    None        Assessment and Plan / Recommendations:  Problem List Items Addressed This Visit    None     1. Persistent Cough- Resolved - may have been post viral/post infection  - STOP Trelegy 100mcg- 1 puff once a day - rinse mouth after each use  - continue albuterol HFA or albuterol nebulizers every 4-6 hours as needed for shortness of breath  - take mucinex BID to help with mucus relief (mucus plugs on CT)    2. Lung Nodules; 4mm/ Former Smoker  - continue with annual LDCT - shared decision making  (Would like to follow with PCP on screenings)    Follow up as needed.    If you have any questions please call the office [phone]    Thank you for visiting the Pulmonary clinic today!   Alexandra Peters CNP  [phone]    "

## 2025-05-22 ENCOUNTER — APPOINTMENT (OUTPATIENT)
Facility: CLINIC | Age: 70
End: 2025-05-22
Payer: MEDICARE

## 2025-05-22 VITALS
SYSTOLIC BLOOD PRESSURE: 144 MMHG | HEART RATE: 69 BPM | HEIGHT: 62 IN | DIASTOLIC BLOOD PRESSURE: 76 MMHG | TEMPERATURE: 98.1 F | BODY MASS INDEX: 17.37 KG/M2 | WEIGHT: 94.4 LBS | OXYGEN SATURATION: 100 %

## 2025-05-22 DIAGNOSIS — R91.8 LUNG NODULES: ICD-10-CM

## 2025-05-22 DIAGNOSIS — Z87.891 FORMER SMOKER: ICD-10-CM

## 2025-05-22 DIAGNOSIS — R05.3 CHRONIC COUGH: Primary | ICD-10-CM

## 2025-05-22 PROCEDURE — 3077F SYST BP >= 140 MM HG: CPT

## 2025-05-22 PROCEDURE — 1159F MED LIST DOCD IN RCRD: CPT

## 2025-05-22 PROCEDURE — 1036F TOBACCO NON-USER: CPT

## 2025-05-22 PROCEDURE — 99214 OFFICE O/P EST MOD 30 MIN: CPT

## 2025-05-22 PROCEDURE — 3078F DIAST BP <80 MM HG: CPT

## 2025-05-22 PROCEDURE — 3008F BODY MASS INDEX DOCD: CPT

## 2025-05-22 ASSESSMENT — COPD QUESTIONNAIRES
QUESTION3_CHESTTIGHTNESS: 0 - MY CHEST DOES NOT FEEL TIGHT AT ALL
QUESTION7_SLEEPQUALITY: 0 - I SLEEP SOUNDLY
QUESTION8_ENERGYLEVEL: 2
QUESTION2_CHESTPHLEGM: 0 - I HAVE NO PHLEGM (MUCUS) IN MY CHEST AT ALL
CAT_TOTALSCORE: 5
QUESTION6_LEAVINGHOUSE: 0 - I AM CONFIDENT LEAVING MY HOME DESPITE MY LUNG CONDITION
QUESTION1_COUGHFREQUENCY: 2
QUESTION5_HOMEACTIVITIES: 1
QUESTION4_WALKINCLINE: 0 - WHEN I WALK UP A HILL OR ONE FLIGHT OF STAIRS I AM NOT BREATHLESS

## 2025-06-04 ENCOUNTER — APPOINTMENT (OUTPATIENT)
Dept: CARDIAC REHAB | Facility: CLINIC | Age: 70
End: 2025-06-04
Payer: MEDICARE

## 2025-06-04 DIAGNOSIS — F17.210 CIGARETTE SMOKER: ICD-10-CM

## 2025-06-04 DIAGNOSIS — F17.200 SMOKES AND MOTIVATED TO QUIT: ICD-10-CM

## 2025-06-04 PROCEDURE — 99406 BEHAV CHNG SMOKING 3-10 MIN: CPT | Mod: 95 | Performed by: INTERNAL MEDICINE

## 2025-06-04 RX ORDER — NICOTINE 7MG/24HR
1 PATCH, TRANSDERMAL 24 HOURS TRANSDERMAL EVERY 24 HOURS
Qty: 30 PATCH | Refills: 0 | Status: SHIPPED | OUTPATIENT
Start: 2025-06-04 | End: 2025-07-04

## 2025-06-04 NOTE — PROGRESS NOTES
Tobacco Treatment Counseling Follow Up Visit    Name: Sang Joy            MRN: [id number]          YOB: 1955           Age: 69 y.o.                  Today's Date: 6/4/2025  Primary Care Physician: AHMET Prabhakar  Referring Physician: No ref. provider found  Program Location: [place] [address] [address]       Virtual or Telephone Consent    An interactive audio and video telecommunication system which permits real time communications between the patient (at the originating site) and provider (at the distant site) was utilized to provide this telehealth service.   Verbal consent was requested and obtained from Sang Joy on this date, 06/04/25 for a telehealth visit and the patient's location was confirmed at the time of the visit.    Start time: 10:02 AM    End time: 10:11 AM    Sang Joy presents for follow up session for Tobacco Treatment Counseling. UPDATES: Patient reports that she is still smoke-free at this time. She says that she's been overall feeling okay. Has had some rough days where she hasn't been feeling well due to the weather.   Patient is not using any nicotine replacement therapy. She discontinued the 7 mg patches last week. She overall felt fine after discontinuation, did experience some cravings but they were manageable. Advised patient to keep some 7 mg patches on hand in case she ever needs them. Will call in refill.  Will continue to follow for relapse prevention support. Follow up one month 7/2/25.         Diane Mendes RN

## 2025-07-02 ENCOUNTER — APPOINTMENT (OUTPATIENT)
Dept: CARDIAC REHAB | Facility: CLINIC | Age: 70
End: 2025-07-02
Payer: MEDICARE

## 2025-07-02 DIAGNOSIS — F17.210 CIGARETTE SMOKER: ICD-10-CM

## 2025-07-02 PROCEDURE — 99406 BEHAV CHNG SMOKING 3-10 MIN: CPT | Mod: 95 | Performed by: INTERNAL MEDICINE

## 2025-07-02 NOTE — PROGRESS NOTES
Tobacco Treatment Counseling Follow Up Visit    Name: Sang Joy            MRN: [id number]          YOB: 1955           Age: 69 y.o.                  Today's Date: 7/2/2025  Primary Care Physician: AHMET Prabhakar  Referring Physician: No ref. provider found  Program Location: [place] [address] [address]       Virtual or Telephone Consent    An interactive audio and video telecommunication system which permits real time communications between the patient (at the originating site) and provider (at the distant site) was utilized to provide this telehealth service.   Verbal consent was requested and obtained from Sang Joy on this date, 07/02/25 for a telehealth visit and the patient's location was confirmed at the time of the visit.    Start time: 10:00 AM   End time: 10:10 AM    Sang Joy presents for follow up session for Tobacco Treatment Counseling. UPDATES: Patient remains smoke-free at this time. She has reached 3-month PPA.   Reports that her blood pressure has been very low. She does take metoprolol and losartan. May need to lower dose of these medications as her BP and HR has probably naturally reduced.  Has had some challenges over the past few weeks with her camper which has caused her stress, but she was able to work through these situations without giving in to cigarette cravings.   Overall doing extremely well with cessation efforts. Will continue to follow for relapse prevention support.  Follow up scheduled 8/13/25.       Diane Mendes RN

## 2025-07-17 ENCOUNTER — APPOINTMENT (OUTPATIENT)
Dept: CARDIOLOGY | Facility: CLINIC | Age: 70
End: 2025-07-17
Payer: MEDICARE

## 2025-07-31 LAB
ALBUMIN SERPL-MCNC: 4 G/DL (ref 3.6–5.1)
ALBUMIN/GLOB SERPL: 2.2 (CALC) (ref 1–2.5)
ALP SERPL-CCNC: 176 U/L (ref 37–153)
ALT SERPL-CCNC: 60 U/L (ref 6–29)
ANION GAP SERPL CALCULATED.4IONS-SCNC: 13 MMOL/L (CALC) (ref 7–17)
AST SERPL-CCNC: 113 U/L (ref 10–35)
BILIRUB DIRECT SERPL-MCNC: 0.2 MG/DL
BILIRUB INDIRECT SERPL-MCNC: 0.4 MG/DL (CALC) (ref 0.2–1.2)
BILIRUB SERPL-MCNC: 0.6 MG/DL (ref 0.2–1.2)
BUN SERPL-MCNC: 12 MG/DL (ref 7–25)
BUN/CREAT SERPL: ABNORMAL (CALC) (ref 6–22)
CALCIUM SERPL-MCNC: 9.2 MG/DL (ref 8.6–10.4)
CCP IGG SERPL-ACNC: <16 UNITS
CHLORIDE SERPL-SCNC: 97 MMOL/L (ref 98–110)
CHOLEST SERPL-MCNC: 213 MG/DL
CHOLEST/HDLC SERPL: 2.5 (CALC)
CO2 SERPL-SCNC: 23 MMOL/L (ref 20–32)
CREAT SERPL-MCNC: 0.91 MG/DL (ref 0.5–1.05)
CRP SERPL-MCNC: <3 MG/L
EGFRCR SERPLBLD CKD-EPI 2021: 69 ML/MIN/1.73M2
ERYTHROCYTE [DISTWIDTH] IN BLOOD BY AUTOMATED COUNT: 14.7 % (ref 11–15)
EST. AVERAGE GLUCOSE BLD GHB EST-MCNC: 100 MG/DL
EST. AVERAGE GLUCOSE BLD GHB EST-SCNC: 5.5 MMOL/L
GLOBULIN SER CALC-MCNC: 1.8 G/DL (CALC) (ref 1.9–3.7)
GLUCOSE SERPL-MCNC: 87 MG/DL (ref 65–99)
HBA1C MFR BLD: 5.1 %
HCT VFR BLD AUTO: 28 % (ref 35–45)
HDLC SERPL-MCNC: 84 MG/DL
HGB BLD-MCNC: 9.1 G/DL (ref 11.7–15.5)
LDLC SERPL CALC-MCNC: ABNORMAL MG/DL
MAGNESIUM SERPL-MCNC: 1.2 MG/DL (ref 1.5–2.5)
MCH RBC QN AUTO: 34.5 PG (ref 27–33)
MCHC RBC AUTO-ENTMCNC: 32.5 G/DL (ref 32–36)
MCV RBC AUTO: 106.1 FL (ref 80–100)
NONHDLC SERPL-MCNC: 129 MG/DL (CALC)
PLATELET # BLD AUTO: 215 THOUSAND/UL (ref 140–400)
PMV BLD REES-ECKER: 12 FL (ref 7.5–12.5)
POTASSIUM SERPL-SCNC: 4.4 MMOL/L (ref 3.5–5.3)
PROT SERPL-MCNC: 5.8 G/DL (ref 6.1–8.1)
RBC # BLD AUTO: 2.64 MILLION/UL (ref 3.8–5.1)
SODIUM SERPL-SCNC: 133 MMOL/L (ref 135–146)
TRIGL SERPL-MCNC: 1243 MG/DL
TSH SERPL-ACNC: 2.5 MIU/L (ref 0.4–4.5)
WBC # BLD AUTO: 5.3 THOUSAND/UL (ref 3.8–10.8)

## 2025-08-07 ENCOUNTER — APPOINTMENT (OUTPATIENT)
Dept: CARDIOLOGY | Facility: CLINIC | Age: 70
End: 2025-08-07
Payer: MEDICARE

## 2025-08-07 VITALS
BODY MASS INDEX: 17.11 KG/M2 | DIASTOLIC BLOOD PRESSURE: 80 MMHG | SYSTOLIC BLOOD PRESSURE: 148 MMHG | WEIGHT: 93 LBS | OXYGEN SATURATION: 99 % | HEART RATE: 88 BPM | HEIGHT: 62 IN

## 2025-08-07 DIAGNOSIS — I47.19 PAT (PAROXYSMAL ATRIAL TACHYCARDIA): ICD-10-CM

## 2025-08-07 DIAGNOSIS — R00.2 PALPITATIONS: ICD-10-CM

## 2025-08-07 DIAGNOSIS — E78.2 MIXED HYPERLIPIDEMIA: ICD-10-CM

## 2025-08-07 DIAGNOSIS — R94.09 ABNORMAL TILT TABLE TEST: ICD-10-CM

## 2025-08-07 DIAGNOSIS — R94.31 ABNORMAL EKG: ICD-10-CM

## 2025-08-07 DIAGNOSIS — Z82.49 FAMILY HISTORY OF EARLY CAD: ICD-10-CM

## 2025-08-07 DIAGNOSIS — Z88.0 ALLERGY TO PENICILLIN: ICD-10-CM
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
## 2025-08-07 DIAGNOSIS — R94.30 ABNORMAL CARDIAC FUNCTION TEST: ICD-10-CM

## 2025-08-07 DIAGNOSIS — M81.0 OSTEOPOROSIS, UNSPECIFIED OSTEOPOROSIS TYPE, UNSPECIFIED PATHOLOGICAL FRACTURE PRESENCE: ICD-10-CM

## 2025-08-07 DIAGNOSIS — R55 VASODEPRESSOR SYNCOPE: ICD-10-CM

## 2025-08-07 DIAGNOSIS — R00.0 SINUS TACHYCARDIA: ICD-10-CM

## 2025-08-07 DIAGNOSIS — Z90.49 S/P APPY: ICD-10-CM

## 2025-08-07 DIAGNOSIS — Z72.0 TOBACCO ABUSE: ICD-10-CM

## 2025-08-07 DIAGNOSIS — R73.03 PRE-DIABETES: ICD-10-CM

## 2025-08-07 DIAGNOSIS — M06.9 RHEUMATOID ARTHRITIS, INVOLVING UNSPECIFIED SITE, UNSPECIFIED WHETHER RHEUMATOID FACTOR PRESENT (MULTI): ICD-10-CM

## 2025-08-07 DIAGNOSIS — R55 SYNCOPE AND COLLAPSE: ICD-10-CM

## 2025-08-07 DIAGNOSIS — I10 PRIMARY HYPERTENSION: ICD-10-CM

## 2025-08-07 DIAGNOSIS — R55 NEUROGENIC SYNCOPE: ICD-10-CM

## 2025-08-07 DIAGNOSIS — Z82.49 FAMILY HISTORY OF ARRHYTHMIA: ICD-10-CM

## 2025-08-07 DIAGNOSIS — I10 ESSENTIAL HYPERTENSION: ICD-10-CM

## 2025-08-07 DIAGNOSIS — K21.9 GASTROESOPHAGEAL REFLUX DISEASE WITHOUT ESOPHAGITIS: ICD-10-CM

## 2025-08-07 DIAGNOSIS — G43.909 MIGRAINE WITHOUT STATUS MIGRAINOSUS, NOT INTRACTABLE, UNSPECIFIED MIGRAINE TYPE: ICD-10-CM

## 2025-08-07 DIAGNOSIS — R42 LIGHTHEADEDNESS: ICD-10-CM

## 2025-08-07 DIAGNOSIS — F41.9 ANXIETY: ICD-10-CM

## 2025-08-07 PROCEDURE — 93000 ELECTROCARDIOGRAM COMPLETE: CPT | Performed by: INTERNAL MEDICINE

## 2025-08-07 PROCEDURE — 3079F DIAST BP 80-89 MM HG: CPT | Performed by: INTERNAL MEDICINE

## 2025-08-07 PROCEDURE — 1159F MED LIST DOCD IN RCRD: CPT | Performed by: INTERNAL MEDICINE

## 2025-08-07 PROCEDURE — 3077F SYST BP >= 140 MM HG: CPT | Performed by: INTERNAL MEDICINE

## 2025-08-07 PROCEDURE — 99214 OFFICE O/P EST MOD 30 MIN: CPT | Performed by: INTERNAL MEDICINE

## 2025-08-07 PROCEDURE — 3008F BODY MASS INDEX DOCD: CPT | Performed by: INTERNAL MEDICINE

## 2025-08-07 RX ORDER — METOPROLOL SUCCINATE 50 MG/1
50 TABLET, EXTENDED RELEASE ORAL DAILY
Qty: 90 TABLET | Refills: 3 | Status: SHIPPED | OUTPATIENT
Start: 2025-08-07 | End: 2026-08-07

## 2025-08-07 RX ORDER — LOSARTAN POTASSIUM 25 MG/1
25 TABLET ORAL DAILY
Qty: 90 TABLET | Refills: 3 | Status: SHIPPED | OUTPATIENT
Start: 2025-08-07 | End: 2026-08-07

## 2025-08-07 NOTE — PROGRESS NOTES
CARDIOLOGY OFFICE NOTE     Date:   8/7/2025    Patient:    Sang Joy    YOB: 1955    Primary Physician: AHMET Prabhakar         REASON FOR VISIT / CHIEF COMPLAINT:     Cardiology follow-up, 6 months    HPI:     Sang Joy was seen in cardiac evaluation at the [facility] Cardiology office August 7, 2025.      The patients problems are listed as in the impression below.    Electronic medical records reviewed.    Patient returns.  She feels well overall.  She has backed off on much of her medications.  She is taking losartan once a day metoprolol normal.  She does have some tachycardia.  She states that she has whitecoat syndrome.  She has no current symptomatology.    Patient denies Chest Pain, SOB, Lightheadedness, Dizziness, TIA or CVA symptoms.  No CHF or Edema.  No Palpitations.  No GI,  or Bleeding Issues. No Recent Fever or Chills.     Cardiovascular and general review of systems is otherwise negative.    A 14-system review is otherwise negative, other than noted.     PHYSICAL EXAMINATION:      Vitals:    08/07/25 1332   BP: 148/80   Pulse: 88   SpO2: 99%     General: No acute distress. Alert and oriented.  Head And Neck Examination: No jugular venous distention, no carotid bruits, no mass. Carotid upstrokes preserved. Oral mucosa moist.  No xanthelasma. Head and neck examination otherwise unremarkable.  Lungs: Clear to auscultation and percussion. No wheezes, no rales,  and no rhonchi.  Chest: Excursion appeared to be normal. No chest wall tenderness on palpation.  Heart: Normal S1 and S2. No S3. No S4. No rub. Grade 1/6 systolic murmur, best heard at the left sternal border. Point of maximal impulse was within normal limits.  Abdomen: Soft. Nontender. No organomegaly. No bruits. No masses.    Extremities: No bipedal edema. No clubbing. No cyanosis.  Pulses are strong throughout. No bruits.  Musculoskeletal Exam: No ulcers, otherwise unremarkable.  Neuro:  Neurologically appeared grossly intact.     IMPRESSION:       Cardiovascular status stable  Chest pain, resolved  Falls, resolved  Syncope, resolved  Sinus tachycardia, resolved  Lightheadedness, resolved  Palpitations, improved  Migraine headaches  Vasodepressive syncope / neurally mediated syncope, positive tilt table study 9/2024.  Paroxysmal atrial tachycardia, 48-hour Holter monitor 8/2024.  Atrial and ventricular premature contractions, rare.  LV systolic function, EF 60%, echocardiogram 3/2024  Negative Lexiscan Myoview perfusion stress test, 8/2024.  Hypertension  Hyperlipidemia  Hypomagnesemia.  GERD   Rheumatoid arthritis  Osteopenia  Prior appendectomy  Tobacco abuse  Penicillin allergy  Family history of coronary artery disease and atrial fibrillation  Otherwise as per assessment below.    RECOMMENDATIONS:      Would suggest that we continue with losartan 25 metoprolol succinate 50 mg daily.  She will continue her other medications.  Refills were provided.    Exercise dietary program.  Hydration.    RockB[employer] use was encouraged.    We will plan to see back in 6 months with Laboratory Studies and ECG as ordered.     Patient will follow up with their primary physician for general care.    The patient knows to contact medical care earlier if need be.      ALLERGIES:     Clarithromycin; Garlic; Influenza virus vaccines; Iodine; Penicillins; Acetaminophen; Codeine; Doxycycline; Tetanus toxoid, adsorbed; and Amoxicillin     MEDICATIONS:     Current Outpatient Medications   Medication Instructions    albuterol (ProAir HFA) 90 mcg/actuation inhaler 2 puffs, inhalation, Every 6 hours PRN    calcium-vitamin D3-vitamin K (Viactiv) 650 mg-12.5 mcg-40 mcg chewable tablet 2 tablets, Daily    cyanocobalamin (VITAMIN B-12) 1,000 mcg, oral, Daily    fluticasone (Flonase Allergy Relief) 50 mcg/actuation nasal spray 1 sprays, Nasal, DAILY, in each nostril, Refill(s) 0    fluticasone-umeclidin-vilanter (Trelegy  Ellipta) 100-62.5-25 mcg blister with device 1 puff, inhalation, Daily    furosemide (LASIX) 40 mg, Daily PRN    hydroxychloroquine (Plaquenil) 200 mg tablet Take 1 tablet Monday Wednesday and Friday with food    krill oil 500 mg capsule Daily    loratadine (CLARITIN) 10 mg, Daily    losartan (COZAAR) 25 mg, oral, 2 times daily    magnesium oxide (MAG-OX) 400 mg, oral, Daily    meclizine (ANTIVERT) 12.5 mg, oral, 3 times daily PRN    metoprolol succinate XL (TOPROL-XL) 50 mg, oral, 2 times daily, Do not crush or chew.    multivitamin capsule 1 capsule, Daily    nicotine (Nicoderm CQ) 14 mg/24 hr patch 1 patch, transdermal, Every 24 hours    nicotine (Nicoderm CQ) 7 mg/24 hr patch 1 patch, transdermal, Every 24 hours, Remove old patch before applying a new one.    omeprazole OTC (PRILOSEC OTC) 20 mg, Daily before breakfast    POTASSIUM GLUCONATE ORAL 90 mg, Daily       ELECTROCARDIOGRAM:      Sinus rhythm, rate 92.  Nonspecific ST-T wave changes.    CARDIAC TESTING:      None this visit    LABORATORY DATA:      CBC:   Lab Results   Component Value Date    WBC 5.3 07/30/2025    RBC 2.64 (L) 07/30/2025    HGB 9.1 (L) 07/30/2025    HCT 28.0 (L) 07/30/2025     07/30/2025        CMP:    Lab Results   Component Value Date     (L) 07/30/2025    K 4.4 07/30/2025    CL 97 (L) 07/30/2025    CO2 23 07/30/2025    BUN 12 07/30/2025    CREATININE 0.91 07/30/2025    GLUCOSE 87 07/30/2025    CALCIUM 9.2 07/30/2025       Magnesium:    Lab Results   Component Value Date    MG 1.2 (L) 07/30/2025       Lipid Profile:    Lab Results   Component Value Date    CHOL 213 (H) 07/30/2025    TRIG 1,243 (H) 07/30/2025    HDL 84 07/30/2025    LDLCALC  07/30/2025      Comment:         LDL cholesterol not calculated. Triglyceride levels  greater than 400 mg/dL invalidate calculated LDL results.           Reference range: <100     Desirable range <100 mg/dL for primary prevention;    <70 mg/dL for patients with CHD or diabetic patients    with > or = 2 CHD risk factors.     LDL-C is now calculated using the Erin   calculation, which is a validated novel method providing   better accuracy than the Friedewald equation in the   estimation of LDL-C.   Hoang SS et al. SUSI. 2013;310(19): 7077-8836   (http://education.[employer]/faq/PYL017)         Hepatic Function Panel:    Lab Results   Component Value Date    ALKPHOS 176 (H) 07/30/2025    ALT 60 (H) 07/30/2025     (H) 07/30/2025    PROT 5.8 (L) 07/30/2025    BILITOT 0.6 07/30/2025    BILIDIR 0.2 07/30/2025       TSH:    Lab Results   Component Value Date    TSH 2.50 07/30/2025       HgBA1c:    Lab Results   Component Value Date    HGBA1C 5.1 07/30/2025                   PROBLEM LIST:     Problem List[1]          Titi Monroy MD, Located within [facility] /  Cardiology      Of Note:  TDX voice recognition dictation software was utilized partially in the preparation of this note, therefore, inaccuracies in spelling, word choice and punctuation may have occurred which were not recognized at the time of signing.    Patient was seen and examined with total time of visit including chart preparation, rooming, and chart completion exceeding 40 minutes.                [1]   Patient Active Problem List  Diagnosis    Rheumatoid arthritis    Fall    Primary hypertension    Tobacco abuse    Neurogenic syncope    Electrolyte depletion    Hypotension due to drugs    Pre-diabetes    Hyponatremia    Hypomagnesemia    Osteopenia of multiple sites    Centrilobular emphysema (Multi)    Pulmonary nodules    Lightheadedness    Family history of arrhythmia    Allergy to penicillin    Mixed hyperlipidemia    Anxiety    Migraine without status migrainosus, not intractable    Gastroesophageal reflux disease without esophagitis    S/P appy    Sinus tachycardia    Abnormal EKG    Palpitations    PAT (paroxysmal atrial tachycardia)    Abnormal cardiac function test    Abnormal tilt table test

## 2025-08-13 ENCOUNTER — APPOINTMENT (OUTPATIENT)
Dept: CARDIAC REHAB | Facility: CLINIC | Age: 70
End: 2025-08-13
Payer: MEDICARE

## 2025-08-13 DIAGNOSIS — F17.210 CIGARETTE SMOKER: ICD-10-CM

## 2025-08-13 PROCEDURE — 99406 BEHAV CHNG SMOKING 3-10 MIN: CPT | Performed by: INTERNAL MEDICINE

## 2025-09-10 ENCOUNTER — APPOINTMENT (OUTPATIENT)
Dept: RHEUMATOLOGY | Facility: CLINIC | Age: 70
End: 2025-09-10
Payer: MEDICARE

## 2025-11-24 ENCOUNTER — APPOINTMENT (OUTPATIENT)
Dept: PRIMARY CARE | Facility: CLINIC | Age: 70
End: 2025-11-24
Payer: MEDICARE

## 2026-02-19 ENCOUNTER — APPOINTMENT (OUTPATIENT)
Dept: CARDIOLOGY | Facility: CLINIC | Age: 71
End: 2026-02-19
Payer: MEDICARE

## 2026-03-10 ENCOUNTER — APPOINTMENT (OUTPATIENT)
Dept: PRIMARY CARE | Facility: CLINIC | Age: 71
End: 2026-03-10
Payer: MEDICARE